# Patient Record
Sex: FEMALE | Race: WHITE | NOT HISPANIC OR LATINO | Employment: OTHER | URBAN - METROPOLITAN AREA
[De-identification: names, ages, dates, MRNs, and addresses within clinical notes are randomized per-mention and may not be internally consistent; named-entity substitution may affect disease eponyms.]

---

## 2017-03-21 ENCOUNTER — ALLSCRIPTS OFFICE VISIT (OUTPATIENT)
Dept: OTHER | Facility: OTHER | Age: 62
End: 2017-03-21

## 2017-03-21 DIAGNOSIS — Z12.31 ENCOUNTER FOR SCREENING MAMMOGRAM FOR MALIGNANT NEOPLASM OF BREAST: ICD-10-CM

## 2017-03-21 DIAGNOSIS — Z13.21 ENCOUNTER FOR SCREENING FOR NUTRITIONAL DISORDER: ICD-10-CM

## 2017-03-21 DIAGNOSIS — I10 ESSENTIAL (PRIMARY) HYPERTENSION: ICD-10-CM

## 2017-03-31 ENCOUNTER — HOSPITAL ENCOUNTER (OUTPATIENT)
Dept: RADIOLOGY | Facility: HOSPITAL | Age: 62
Discharge: HOME | End: 2017-03-31
Payer: COMMERCIAL

## 2017-03-31 DIAGNOSIS — Z12.31 ENCOUNTER FOR SCREENING MAMMOGRAM FOR MALIGNANT NEOPLASM OF BREAST: ICD-10-CM

## 2017-03-31 PROCEDURE — G0202 SCR MAMMO BI INCL CAD: HCPCS

## 2017-04-04 ENCOUNTER — ALLSCRIPTS OFFICE VISIT (OUTPATIENT)
Dept: OTHER | Facility: OTHER | Age: 62
End: 2017-04-04

## 2017-04-05 ENCOUNTER — ALLSCRIPTS OFFICE VISIT (OUTPATIENT)
Dept: OTHER | Facility: OTHER | Age: 62
End: 2017-04-05

## 2017-04-06 ENCOUNTER — APPOINTMENT (OUTPATIENT)
Dept: LAB | Facility: CLINIC | Age: 62
End: 2017-04-06
Payer: COMMERCIAL

## 2017-04-06 ENCOUNTER — GENERIC CONVERSION - ENCOUNTER (OUTPATIENT)
Dept: OTHER | Facility: OTHER | Age: 62
End: 2017-04-06

## 2017-04-06 ENCOUNTER — TRANSCRIBE ORDERS (OUTPATIENT)
Dept: LAB | Facility: CLINIC | Age: 62
End: 2017-04-06

## 2017-04-06 ENCOUNTER — TRANSCRIBE ORDERS (OUTPATIENT)
Dept: ADMINISTRATIVE | Facility: HOSPITAL | Age: 62
End: 2017-04-06

## 2017-04-06 ENCOUNTER — LAB REQUISITION (OUTPATIENT)
Dept: LAB | Facility: HOSPITAL | Age: 62
End: 2017-04-06
Payer: COMMERCIAL

## 2017-04-06 DIAGNOSIS — K12.2 ABSCESS OF SUBMANDIBULAR REGION: Primary | ICD-10-CM

## 2017-04-06 DIAGNOSIS — K12.2 CELLULITIS AND ABSCESS OF ORAL SOFT TISSUES: Primary | ICD-10-CM

## 2017-04-06 DIAGNOSIS — R93.89 ABNORMAL CAT SCAN: ICD-10-CM

## 2017-04-06 DIAGNOSIS — K12.2 CELLULITIS AND ABSCESS OF MOUTH: ICD-10-CM

## 2017-04-06 LAB
BUN SERPL-MCNC: 13 MG/DL (ref 5–25)
CREAT SERPL-MCNC: 0.97 MG/DL (ref 0.6–1.3)
GFR SERPL CREATININE-BSD FRML MDRD: 58.2 ML/MIN/1.73SQ M

## 2017-04-06 PROCEDURE — 87070 CULTURE OTHR SPECIMN AEROBIC: CPT | Performed by: OTOLARYNGOLOGY

## 2017-04-06 PROCEDURE — 82565 ASSAY OF CREATININE: CPT

## 2017-04-06 PROCEDURE — 87075 CULTR BACTERIA EXCEPT BLOOD: CPT | Performed by: OTOLARYNGOLOGY

## 2017-04-06 PROCEDURE — 87205 SMEAR GRAM STAIN: CPT | Performed by: OTOLARYNGOLOGY

## 2017-04-06 PROCEDURE — 84520 ASSAY OF UREA NITROGEN: CPT

## 2017-04-06 PROCEDURE — 36415 COLL VENOUS BLD VENIPUNCTURE: CPT

## 2017-04-09 LAB
BACTERIA SPEC ANAEROBE CULT: NORMAL
BACTERIA SPEC ANAEROBE CULT: NORMAL

## 2017-04-10 ENCOUNTER — HOSPITAL ENCOUNTER (OUTPATIENT)
Dept: RADIOLOGY | Facility: HOSPITAL | Age: 62
Discharge: HOME | End: 2017-04-10
Attending: OTOLARYNGOLOGY
Payer: COMMERCIAL

## 2017-04-10 DIAGNOSIS — K12.2 ABSCESS OF SUBMANDIBULAR REGION: ICD-10-CM

## 2017-04-10 PROCEDURE — 70491 CT SOFT TISSUE NECK W/DYE: CPT

## 2017-04-10 RX ADMIN — IOHEXOL 85 ML: 350 INJECTION, SOLUTION INTRAVENOUS at 19:17

## 2017-04-11 LAB
BACTERIA WND AEROBE CULT: NO GROWTH
GRAM STN SPEC: NORMAL
GRAM STN SPEC: NORMAL

## 2017-04-19 ENCOUNTER — HOSPITAL ENCOUNTER (OUTPATIENT)
Dept: RADIOLOGY | Facility: HOSPITAL | Age: 62
Discharge: HOME | End: 2017-04-19
Attending: OTOLARYNGOLOGY
Payer: COMMERCIAL

## 2017-04-19 DIAGNOSIS — R93.89 ABNORMAL CAT SCAN: ICD-10-CM

## 2017-04-19 PROCEDURE — 71260 CT THORAX DX C+: CPT

## 2017-04-19 RX ADMIN — IOHEXOL 85 ML: 350 INJECTION, SOLUTION INTRAVENOUS at 13:12

## 2017-04-26 ENCOUNTER — GENERIC CONVERSION - ENCOUNTER (OUTPATIENT)
Dept: OTHER | Facility: OTHER | Age: 62
End: 2017-04-26

## 2017-06-13 ENCOUNTER — ANESTHESIA EVENT (OUTPATIENT)
Dept: GASTROENTEROLOGY | Facility: HOSPITAL | Age: 62
End: 2017-06-13
Payer: COMMERCIAL

## 2017-06-14 ENCOUNTER — HOSPITAL ENCOUNTER (OUTPATIENT)
Facility: HOSPITAL | Age: 62
Setting detail: OUTPATIENT SURGERY
Discharge: HOME | End: 2017-06-14
Attending: INTERNAL MEDICINE | Admitting: INTERNAL MEDICINE
Payer: COMMERCIAL

## 2017-06-14 ENCOUNTER — ANESTHESIA (OUTPATIENT)
Dept: GASTROENTEROLOGY | Facility: HOSPITAL | Age: 62
End: 2017-06-14
Payer: COMMERCIAL

## 2017-06-14 ENCOUNTER — GENERIC CONVERSION - ENCOUNTER (OUTPATIENT)
Dept: OTHER | Facility: OTHER | Age: 62
End: 2017-06-14

## 2017-06-14 VITALS
BODY MASS INDEX: 34.15 KG/M2 | SYSTOLIC BLOOD PRESSURE: 101 MMHG | TEMPERATURE: 98.1 F | RESPIRATION RATE: 18 BRPM | WEIGHT: 200 LBS | OXYGEN SATURATION: 98 % | DIASTOLIC BLOOD PRESSURE: 69 MMHG | HEIGHT: 64 IN | HEART RATE: 75 BPM

## 2017-06-14 DIAGNOSIS — K21.9 GASTRO-ESOPHAGEAL REFLUX DISEASE WITHOUT ESOPHAGITIS: ICD-10-CM

## 2017-06-14 DIAGNOSIS — K51.90 ULCERATIVE COLITIS WITHOUT COMPLICATIONS (HCC): ICD-10-CM

## 2017-06-14 PROCEDURE — 88342 IMHCHEM/IMCYTCHM 1ST ANTB: CPT | Performed by: INTERNAL MEDICINE

## 2017-06-14 PROCEDURE — 88305 TISSUE EXAM BY PATHOLOGIST: CPT | Performed by: INTERNAL MEDICINE

## 2017-06-14 RX ORDER — PROPOFOL 10 MG/ML
INJECTION, EMULSION INTRAVENOUS AS NEEDED
Status: DISCONTINUED | OUTPATIENT
Start: 2017-06-14 | End: 2017-06-14 | Stop reason: SURG

## 2017-06-14 RX ORDER — SODIUM CHLORIDE 9 MG/ML
50 INJECTION, SOLUTION INTRAVENOUS CONTINUOUS
Status: DISCONTINUED | OUTPATIENT
Start: 2017-06-14 | End: 2017-06-14 | Stop reason: HOSPADM

## 2017-06-14 RX ORDER — PROPOFOL 10 MG/ML
INJECTION, EMULSION INTRAVENOUS CONTINUOUS PRN
Status: DISCONTINUED | OUTPATIENT
Start: 2017-06-14 | End: 2017-06-14 | Stop reason: SURG

## 2017-06-14 RX ORDER — IBUPROFEN AND FAMOTIDINE 26.6; 8 MG/1; MG/1
1 TABLET, FILM COATED ORAL AS NEEDED
COMMUNITY
End: 2018-06-30

## 2017-06-14 RX ADMIN — PROPOFOL 50 MG: 10 INJECTION, EMULSION INTRAVENOUS at 13:27

## 2017-06-14 RX ADMIN — PROPOFOL 150 MCG/KG/MIN: 10 INJECTION, EMULSION INTRAVENOUS at 13:29

## 2017-06-14 RX ADMIN — PROPOFOL 100 MG: 10 INJECTION, EMULSION INTRAVENOUS at 13:25

## 2017-06-14 RX ADMIN — LIDOCAINE HYDROCHLORIDE 50 MG: 20 INJECTION, SOLUTION INTRAVENOUS at 13:25

## 2017-06-14 RX ADMIN — SODIUM CHLORIDE 50 ML/HR: 0.9 INJECTION, SOLUTION INTRAVENOUS at 12:14

## 2017-06-26 ENCOUNTER — ALLSCRIPTS OFFICE VISIT (OUTPATIENT)
Dept: OTHER | Facility: OTHER | Age: 62
End: 2017-06-26

## 2017-06-26 DIAGNOSIS — K51.90 ULCERATIVE COLITIS WITHOUT COMPLICATIONS (HCC): ICD-10-CM

## 2017-06-26 DIAGNOSIS — R10.13 EPIGASTRIC PAIN: ICD-10-CM

## 2017-06-26 DIAGNOSIS — I10 ESSENTIAL (PRIMARY) HYPERTENSION: ICD-10-CM

## 2017-06-26 DIAGNOSIS — Z76.89 PERSONS ENCOUNTERING HEALTH SERVICES IN OTHER SPECIFIED CIRCUMSTANCES: ICD-10-CM

## 2017-12-11 ENCOUNTER — ALLSCRIPTS OFFICE VISIT (OUTPATIENT)
Dept: OTHER | Facility: OTHER | Age: 62
End: 2017-12-11

## 2017-12-11 DIAGNOSIS — K51.90 ULCERATIVE COLITIS WITHOUT COMPLICATIONS (HCC): ICD-10-CM

## 2017-12-11 DIAGNOSIS — I10 ESSENTIAL (PRIMARY) HYPERTENSION: ICD-10-CM

## 2018-01-10 NOTE — MISCELLANEOUS
Message  I spoke with Ms Charles today and let her know the plan for her IVC filter prior to her next ELICEO including stopping coumadin 5 days before and going on lovenox  She will continue with coumadin for now and take 3mg of coumadin until her next INR  She mentioned she is going to the dentist soon and needs abx  I will send in amoxicillin  She understands the plan and agrees  Plan  Prothrombin gene mutation, Status post total replacement of right hip    · Enoxaparin Sodium 40 MG/0 4ML Subcutaneous Solution;  Stop coumadin 5 days  prior to surgery and start Lovenox 40mg subQ daily until day of surgery,  not including day of surgery  Status post total replacement of right hip    · Amoxicillin 500 MG Oral Tablet; TAKE 4 TABLETS 1 HOUR BEFORE DENTAL  APPOINTMENT    Signatures   Electronically signed by : Doron Peralta DO; Mar  8 2016  4:55PM EST                       (Author)

## 2018-01-12 VITALS
WEIGHT: 224.38 LBS | HEIGHT: 64 IN | BODY MASS INDEX: 38.31 KG/M2 | SYSTOLIC BLOOD PRESSURE: 112 MMHG | OXYGEN SATURATION: 97 % | DIASTOLIC BLOOD PRESSURE: 60 MMHG | HEART RATE: 87 BPM

## 2018-01-12 VITALS
DIASTOLIC BLOOD PRESSURE: 90 MMHG | SYSTOLIC BLOOD PRESSURE: 142 MMHG | HEIGHT: 64 IN | OXYGEN SATURATION: 96 % | HEART RATE: 88 BPM | WEIGHT: 213.5 LBS | BODY MASS INDEX: 36.45 KG/M2 | RESPIRATION RATE: 18 BRPM

## 2018-01-12 NOTE — PROCEDURES
Procedures by Gregory Ramires MD at 6/6/2016   1:36 PM      Author:  Gregory Ramires MD Service:  Interventional Radiology  Author Type:  Physician     Filed:  6/6/2016  1:37 PM Date of Service:  6/6/2016  1:36 PM Status:  Signed     :  Gregory Ramires MD (Physician)            Procedures   INTERVENTIONAL RADIOLOGY PROCEDURE NOTE    Date: 06/06/16    Procedures:     1  US guided right IJ access  2  IVC cavogram  3  Removal of IVC filter  4  Post removal cavogram    Preoperative diagnosis: indwelling filter for removal      Postoperative diagnosis: Same    Surgeon: Gregory Ramires MD     Assistant: None  No qualified resident was available  Blood loss: Minimal    Specimens: none    Findings: filter was completely removed  Cava was intact    Complications: None    Anesthesia: IV moderate conscious sedation    Plan:  Bed rest 1 hour, then may DC home if remains stable                  Received for:Provider  [facility]   Jun 6 2016  1:37PM [facility] Standard Time

## 2018-01-13 VITALS
HEIGHT: 64 IN | TEMPERATURE: 99.4 F | DIASTOLIC BLOOD PRESSURE: 82 MMHG | RESPIRATION RATE: 20 BRPM | OXYGEN SATURATION: 86 % | BODY MASS INDEX: 36.29 KG/M2 | HEART RATE: 84 BPM | WEIGHT: 212.6 LBS | SYSTOLIC BLOOD PRESSURE: 120 MMHG

## 2018-01-13 VITALS
WEIGHT: 211.38 LBS | HEART RATE: 86 BPM | OXYGEN SATURATION: 95 % | BODY MASS INDEX: 37.45 KG/M2 | SYSTOLIC BLOOD PRESSURE: 108 MMHG | HEIGHT: 63 IN | DIASTOLIC BLOOD PRESSURE: 72 MMHG

## 2018-01-23 VITALS
OXYGEN SATURATION: 94 % | WEIGHT: 225 LBS | DIASTOLIC BLOOD PRESSURE: 74 MMHG | HEIGHT: 64 IN | SYSTOLIC BLOOD PRESSURE: 122 MMHG | HEART RATE: 83 BPM | BODY MASS INDEX: 38.41 KG/M2

## 2018-01-23 NOTE — MISCELLANEOUS
Message  Return to work or school:   Sandra Turcios is under my professional care  She was seen in my office on 12/11/2017       Please excuse from work 12/09/2017 and 12/10/2017          Signatures   Electronically signed by : Gina Cummins, ; Dec 11 2017  3:52PM EST                       (Author)

## 2018-02-14 DIAGNOSIS — R10.13 DYSPEPSIA: Primary | ICD-10-CM

## 2018-02-14 RX ORDER — PANTOPRAZOLE SODIUM 20 MG/1
TABLET, DELAYED RELEASE ORAL
Qty: 30 TABLET | Refills: 1 | Status: SHIPPED | OUTPATIENT
Start: 2018-02-14 | End: 2018-04-15 | Stop reason: SDUPTHER

## 2018-03-08 DIAGNOSIS — F51.01 PRIMARY INSOMNIA: Primary | ICD-10-CM

## 2018-03-08 RX ORDER — ZOLPIDEM TARTRATE 10 MG/1
TABLET ORAL
Qty: 90 TABLET | Refills: 3 | Status: SHIPPED | OUTPATIENT
Start: 2018-03-08 | End: 2018-03-15 | Stop reason: SDUPTHER

## 2018-03-15 DIAGNOSIS — F51.01 PRIMARY INSOMNIA: ICD-10-CM

## 2018-03-15 RX ORDER — ZOLPIDEM TARTRATE 10 MG/1
10 TABLET ORAL
Qty: 90 TABLET | Refills: 0 | Status: SHIPPED | OUTPATIENT
Start: 2018-03-15 | End: 2018-11-05 | Stop reason: SDUPTHER

## 2018-03-19 DIAGNOSIS — B37.2 YEAST DERMATITIS: Primary | ICD-10-CM

## 2018-03-19 RX ORDER — NYSTATIN 100000 [USP'U]/G
POWDER TOPICAL 2 TIMES DAILY
Qty: 15 G | Refills: 0 | Status: SHIPPED | OUTPATIENT
Start: 2018-03-19 | End: 2021-10-23 | Stop reason: HOSPADM

## 2018-04-15 DIAGNOSIS — R10.13 DYSPEPSIA: ICD-10-CM

## 2018-04-16 RX ORDER — PANTOPRAZOLE SODIUM 20 MG/1
TABLET, DELAYED RELEASE ORAL
Qty: 30 TABLET | Refills: 1 | Status: SHIPPED | OUTPATIENT
Start: 2018-04-16 | End: 2018-04-19 | Stop reason: SDUPTHER

## 2018-04-19 DIAGNOSIS — R10.13 DYSPEPSIA: ICD-10-CM

## 2018-04-19 RX ORDER — PANTOPRAZOLE SODIUM 20 MG/1
20 TABLET, DELAYED RELEASE ORAL DAILY
Qty: 90 TABLET | Refills: 1 | Status: SHIPPED | OUTPATIENT
Start: 2018-04-19 | End: 2018-04-27 | Stop reason: SDUPTHER

## 2018-04-27 ENCOUNTER — TELEPHONE (OUTPATIENT)
Dept: INTERNAL MEDICINE CLINIC | Facility: CLINIC | Age: 63
End: 2018-04-27

## 2018-04-27 DIAGNOSIS — R10.13 DYSPEPSIA: ICD-10-CM

## 2018-04-27 DIAGNOSIS — I15.9 SECONDARY HYPERTENSION: Primary | ICD-10-CM

## 2018-04-27 DIAGNOSIS — F32.A DEPRESSION, UNSPECIFIED DEPRESSION TYPE: ICD-10-CM

## 2018-04-27 DIAGNOSIS — F32.A DEPRESSION, UNSPECIFIED DEPRESSION TYPE: Primary | ICD-10-CM

## 2018-04-27 RX ORDER — FLUOXETINE 10 MG/1
30 CAPSULE ORAL DAILY
Qty: 90 CAPSULE | Refills: 1 | Status: SHIPPED | OUTPATIENT
Start: 2018-04-27 | End: 2021-10-23 | Stop reason: HOSPADM

## 2018-04-27 RX ORDER — PANTOPRAZOLE SODIUM 20 MG/1
20 TABLET, DELAYED RELEASE ORAL DAILY
Qty: 90 TABLET | Refills: 1 | Status: SHIPPED | OUTPATIENT
Start: 2018-04-27 | End: 2018-10-02 | Stop reason: SDUPTHER

## 2018-04-27 RX ORDER — OLMESARTAN MEDOXOMIL 40 MG/1
40 TABLET ORAL DAILY
Qty: 90 TABLET | Refills: 1 | Status: SHIPPED | OUTPATIENT
Start: 2018-04-27 | End: 2019-01-17 | Stop reason: SDUPTHER

## 2018-04-27 RX ORDER — FLUOXETINE 10 MG/1
10 TABLET, FILM COATED ORAL DAILY
Qty: 90 TABLET | Refills: 1 | Status: SHIPPED | OUTPATIENT
Start: 2018-04-27 | End: 2018-06-30

## 2018-04-27 RX ORDER — FLUOXETINE HYDROCHLORIDE 20 MG/1
20 CAPSULE ORAL DAILY
Qty: 90 CAPSULE | Refills: 1 | Status: SHIPPED | OUTPATIENT
Start: 2018-04-27 | End: 2018-06-30

## 2018-04-27 RX ORDER — ATENOLOL 25 MG/1
50 TABLET ORAL EVERY MORNING
Qty: 90 TABLET | Refills: 1 | Status: SHIPPED | OUTPATIENT
Start: 2018-04-27 | End: 2018-05-10 | Stop reason: SDUPTHER

## 2018-04-27 RX ORDER — FLUOXETINE HYDROCHLORIDE 20 MG/1
CAPSULE ORAL
Qty: 90 CAPSULE | Refills: 3 | Status: SHIPPED | OUTPATIENT
Start: 2018-04-27 | End: 2018-04-27 | Stop reason: SDUPTHER

## 2018-04-27 RX ORDER — FLUOXETINE 10 MG/1
TABLET, FILM COATED ORAL
Qty: 90 TABLET | Refills: 3 | Status: SHIPPED | OUTPATIENT
Start: 2018-04-27 | End: 2018-04-27 | Stop reason: SDUPTHER

## 2018-04-27 NOTE — TELEPHONE ENCOUNTER
Pt called and states that all her prescriptions now have to go through [facility] and need doctors approval  She is asking if you can call her back at [phone]

## 2018-04-30 NOTE — TELEPHONE ENCOUNTER
PT called again and said [facility] waiting for approval  Please call her preferably before 3 pm

## 2018-05-10 DIAGNOSIS — I15.9 SECONDARY HYPERTENSION: ICD-10-CM

## 2018-05-11 RX ORDER — ATENOLOL 25 MG/1
50 TABLET ORAL EVERY MORNING
Qty: 180 TABLET | Refills: 0 | Status: SHIPPED | OUTPATIENT
Start: 2018-05-11 | End: 2019-03-27 | Stop reason: SDUPTHER

## 2018-06-30 ENCOUNTER — APPOINTMENT (EMERGENCY)
Dept: RADIOLOGY | Facility: HOSPITAL | Age: 63
End: 2018-06-30
Payer: COMMERCIAL

## 2018-06-30 ENCOUNTER — HOSPITAL ENCOUNTER (EMERGENCY)
Facility: HOSPITAL | Age: 63
Discharge: HOME | End: 2018-06-30
Attending: EMERGENCY MEDICINE
Payer: COMMERCIAL

## 2018-06-30 VITALS
DIASTOLIC BLOOD PRESSURE: 99 MMHG | OXYGEN SATURATION: 92 % | SYSTOLIC BLOOD PRESSURE: 151 MMHG | TEMPERATURE: 98.2 F | RESPIRATION RATE: 18 BRPM | HEART RATE: 90 BPM

## 2018-06-30 DIAGNOSIS — M25.511 SHOULDER PAIN, RIGHT: Primary | ICD-10-CM

## 2018-06-30 PROCEDURE — 73030 X-RAY EXAM OF SHOULDER: CPT

## 2018-06-30 PROCEDURE — 99283 EMERGENCY DEPT VISIT LOW MDM: CPT

## 2018-06-30 RX ORDER — ACETAMINOPHEN AND CODEINE PHOSPHATE 300; 30 MG/1; MG/1
1-2 TABLET ORAL EVERY 6 HOURS PRN
Qty: 10 TABLET | Refills: 0 | Status: SHIPPED | OUTPATIENT
Start: 2018-06-30 | End: 2019-12-18 | Stop reason: ALTCHOICE

## 2018-06-30 NOTE — ED NOTES
Pt complains of right shoulder pain since yesterday, states she did not injury to extremity or fall recently  Pt states she believes it is her rotator cuff, states she does have an other physician but has not seen them for this pain   Pt complains of increased stiffness       Cindy Lerner RN  06/30/18 [id number]

## 2018-06-30 NOTE — ED PROVIDER NOTES
History  Chief Complaint   Patient presents with    Shoulder Injury     C/o right shoulder pain x several months  No recent trauma noted  62 y/o female with a year hx of on and off right shoulder pain, oa, depression and anxiety nos came to ed c/o right shoulder worsening pain since yesterday  Pt deny fall or other trauma she could remember  She states gradual onset pain that got worse this morning  Pt with sifnificant loss or rom  Also she appears tender to palpation or right shoulder bicep tendon inserction at shoulder  Can not r/o other soft tissue shoulder pain causes, bursitis or rotator cuff tear or strain  Pt deny systemic sx  Pain better with no rom and but attempted rom pain worse  Prior to Admission Medications   Prescriptions Last Dose Informant Patient Reported? Taking? FLUoxetine (PROZAC) 10 mg capsule   No Yes   Sig: Take 3 capsules (30 mg total) by mouth daily   atenolol (TENORMIN) 25 mg tablet   No Yes   Sig: Take 2 tablets (50 mg total) by mouth every morning   cycloSPORINE (RESTASIS) 0 05 % ophthalmic emulsion   Yes Yes   Si drop 2 (two) times a day    gabapentin (NEURONTIN) 300 mg capsule   Yes Yes   Sig: Take 300 mg by mouth 3 (three) times a day     nystatin (MYCOSTATIN) powder   No Yes   Sig: Apply topically 2 (two) times a day   olmesartan (BENICAR) 40 mg tablet   No Yes   Sig: Take 1 tablet (40 mg total) by mouth daily   pantoprazole (PROTONIX) 20 mg tablet   No Yes   Sig: Take 1 tablet (20 mg total) by mouth daily   zolpidem (AMBIEN) 10 mg tablet   No Yes   Sig: Take 1 tablet (10 mg total) by mouth daily at bedtime      Facility-Administered Medications: None       Past Medical History:   Diagnosis Date    Acid reflux     Anxiety     Arthritis     Back pain     Carpal tunnel syndrome, bilateral     Depression     Hiatal hernia     History of DVT of lower extremity     Right leg-had IVc filter  removal today-2016    History of pulmonary embolism     History of uterine cancer     Was stage 3- uterine, ovaries-hysterectomy    Hypercholesteremia     Hypertension     Left leg pain     Lumbar herniated disc     Tumor cells, benign     In right atrium  Probably congenital     Varicose vein of leg     Wears glasses        Past Surgical History:   Procedure Laterality Date    COLONOSCOPY      DENTAL SURGERY      DILATION AND CURETTAGE OF UTERUS      ESOPHAGOGASTRODUODENOSCOPY      FOOT SURGERY      LAST ASSESSED: [id number]    HYSTERECTOMY      uterine cancer-hx    JOINT REPLACEMENT      b/l   THR    [place] COLONOSCOPY FLX DX W/COLLJ SPEC WHEN PFRMD N/A 6/14/2017    Procedure: EGD AND COLONOSCOPY;  Surgeon: Autry Jeans, MD;  [facility]; Service: Gastroenterology    [place] TOTAL HIP ARTHROPLASTY Left 4/13/2016    Procedure: ARTHROPLASTY HIP TOTAL;  Surgeon: Pearle Meigs, DO;  [facility];  Service: Orthopedics    TONSILLECTOMY AND ADENOIDECTOMY      TOTAL HIP ARTHROPLASTY Right     LAST ASSESSED: [id number]       Family History   Problem Relation Age of Onset    Heart failure Father     Arthritis Family      I have reviewed and agree with the history as documented  Social History   Substance Use Topics    Smoking status: Current Every Day Smoker     Packs/day: 0 20     Years: 20 00     Types: Cigarettes    Smokeless tobacco: Never Used      Comment: Smokes 5 cigarettes daily,hx age 12 to 2012-2ppd    Alcohol use Yes      Comment: 2 or 3 monthly        Review of Systems   Respiratory: Negative  Cardiovascular: Negative  Gastrointestinal: Negative  Musculoskeletal: Positive for arthralgias and myalgias  Skin: Negative  All other systems reviewed and are negative  Physical Exam  Physical Exam   Constitutional: No distress  Eyes: Right eye exhibits no discharge  Left eye exhibits no discharge  Neck: Normal range of motion  Pulmonary/Chest: Effort normal  No respiratory distress     Abdominal: There is no guarding  Musculoskeletal: She exhibits tenderness  She exhibits no deformity  Arms:  Tender to palpation of bicep tendon insertion   Skin: Skin is warm  No rash noted  She is not diaphoretic  No erythema  Nursing note and vitals reviewed  Vital Signs  ED Triage Vitals [06/30/18 1501]   Temperature Pulse Respirations Blood Pressure SpO2   98 2 °F (36 8 °C) 90 18 151/99 92 %      Temp Source Heart Rate Source Patient Position - Orthostatic VS BP Location FiO2 (%)   Oral Monitor Sitting Right arm --      Pain Score       Worst Possible Pain           Vitals:    06/30/18 1501   BP: 151/99   Pulse: 90   Patient Position - Orthostatic VS: Sitting       Visual Acuity      ED Medications  Medications - No data to display    Diagnostic Studies  Results Reviewed     None                 XR shoulder 2+ views RIGHT   ED Interpretation by Royal Pipe PA-C (06/30 1640)   No fx                 Procedures  Procedures       Phone Contacts  ED Phone Contact    ED Course                               MDM  Number of Diagnoses or Management Options  Shoulder pain, right:   Diagnosis management comments:  Pt does not have allergy to Tylenol #3  She wants some rx  Amount and/or Complexity of Data Reviewed  Tests in the radiology section of CPT®: ordered and reviewed      CritCare Time    Disposition  Final diagnoses:   Shoulder pain, right     Time reflects when diagnosis was documented in both MDM as applicable and the Disposition within this note     Time User Action Codes Description Comment    6/30/2018  4:49 PM Ian Bethea Add [Y30 507] Shoulder pain, right       ED Disposition     ED Disposition Condition Comment    Discharge  Hayder Brizuela discharge to home/self care      Condition at discharge: Stable        Follow-up Information     Follow up With Specialties Details Why Contact Radha Lawson MD Sports Medicine, Orthopedic Surgery, Multidisciplinary   [address] [facility] [place] [address]  [phone]            Patient's Medications   Discharge Prescriptions    ACETAMINOPHEN-CODEINE (TYLENOL #3) 300-30 MG PER TABLET    Take 1-2 tablets by mouth every 6 (six) hours as needed for moderate pain for up to 10 doses       Start Date: 6/30/2018 End Date: --       Order Dose: 1-2 tablets       Quantity: 10 tablet    Refills: 0     No discharge procedures on file      ED Provider  Electronically Signed by           Monie Oglesby PA-C  06/30/18 7548

## 2018-06-30 NOTE — DISCHARGE INSTRUCTIONS
Shoulder Pain, Ambulatory Care   GENERAL INFORMATION:   Shoulder pain  is a common problem and can affect your daily activities  Pain can be caused by a problem within your shoulder  Shoulder pain may also be caused by pain that spreads to your shoulder from another part of your body  Seek immediate care for the following symptoms:   · Severe pain    · Inability to move your arm or shoulder    · Numbness or tingling in your shoulder or arm  Treatment for shoulder pain  may include any of the following:  · Acetaminophen  decreases pain and fever  It is available without a doctor's order  Ask how much to take and how often to take it  Follow directions  Acetaminophen can cause liver damage if not taken correctly  · NSAIDs  help decrease swelling and pain or fever  This medicine is available with or without a doctor's order  NSAIDs can cause stomach bleeding or kidney problems in certain people  If you take blood thinner medicine, always ask your healthcare provider if NSAIDs are safe for you  Always read the medicine label and follow directions  · A steroid injection  may help decrease pain and swelling  · Surgery  may be needed for long-term pain and loss of function  Manage your symptoms:   · Apply ice  on your shoulder for 20 to 30 minutes every 2 hours or as directed  Use an ice pack, or put crushed ice in a plastic bag  Cover it with a towel  Ice helps prevent tissue damage and decreases swelling and pain  · Apply heat if ice does not help your symptoms  Apply heat on your shoulder for 20 to 30 minutes every 2 hours for as many days as directed  Heat helps decrease pain and muscle spasms  · Go to physical or occupational therapy as directed  A physical therapist teaches you exercises to help improve movement and strength, and to decrease pain  An occupational therapist teaches you skills to help with your daily activities  Prevent shoulder pain:   · Stretch and strengthen your shoulder  Use proper technique during exercises and sports  · Limit activities as directed  Try to avoid repeated overhead movements  Follow up with your healthcare provider or orthopedist as directed:  Write down your questions so you remember to ask them during your visits  CARE AGREEMENT:   You have the right to help plan your care  Learn about your health condition and how it may be treated  Discuss treatment options with your caregivers to decide what care you want to receive  You always have the right to refuse treatment  The above information is an  only  It is not intended as medical advice for individual conditions or treatments  Talk to your doctor, nurse or pharmacist before following any medical regimen to see if it is safe and effective for you  © 2014 [address] is for End User's use only and may not be sold, redistributed or otherwise used for commercial purposes  All illustrations and images included in CareNotes® are the copyrighted property of A D A M , Inc  or Hector Vasquez

## 2018-07-18 ENCOUNTER — OFFICE VISIT (OUTPATIENT)
Dept: OBGYN CLINIC | Facility: OTHER | Age: 63
End: 2018-07-18
Payer: COMMERCIAL

## 2018-07-18 VITALS — WEIGHT: 209 LBS | BODY MASS INDEX: 37.03 KG/M2 | HEIGHT: 63 IN

## 2018-07-18 DIAGNOSIS — M19.011 OSTEOARTHRITIS OF GLENOHUMERAL JOINT, RIGHT: Primary | ICD-10-CM

## 2018-07-18 PROCEDURE — 99214 OFFICE O/P EST MOD 30 MIN: CPT | Performed by: ORTHOPAEDIC SURGERY

## 2018-07-18 PROCEDURE — 20610 DRAIN/INJ JOINT/BURSA W/O US: CPT | Performed by: ORTHOPAEDIC SURGERY

## 2018-07-18 RX ORDER — METHYLPREDNISOLONE ACETATE 40 MG/ML
1 INJECTION, SUSPENSION INTRA-ARTICULAR; INTRALESIONAL; INTRAMUSCULAR; SOFT TISSUE
Status: COMPLETED | OUTPATIENT
Start: 2018-07-18 | End: 2018-07-18

## 2018-07-18 RX ORDER — BUPIVACAINE HYDROCHLORIDE 2.5 MG/ML
4 INJECTION, SOLUTION INFILTRATION; PERINEURAL
Status: COMPLETED | OUTPATIENT
Start: 2018-07-18 | End: 2018-07-18

## 2018-07-18 RX ORDER — CYCLOSPORINE 0.5 MG/ML
EMULSION OPHTHALMIC
COMMUNITY
End: 2018-07-18 | Stop reason: SDUPTHER

## 2018-07-18 RX ORDER — OMEPRAZOLE 20 MG/1
CAPSULE, DELAYED RELEASE ORAL
COMMUNITY
End: 2018-10-10 | Stop reason: ALTCHOICE

## 2018-07-18 RX ORDER — FLUOXETINE 20 MG/1
1 TABLET, FILM COATED ORAL DAILY
COMMUNITY
End: 2018-07-18 | Stop reason: SDUPTHER

## 2018-07-18 RX ADMIN — BUPIVACAINE HYDROCHLORIDE 4 ML: 2.5 INJECTION, SOLUTION INFILTRATION; PERINEURAL at 14:41

## 2018-07-18 RX ADMIN — METHYLPREDNISOLONE ACETATE 1 ML: 40 INJECTION, SUSPENSION INTRA-ARTICULAR; INTRALESIONAL; INTRAMUSCULAR; SOFT TISSUE at 14:41

## 2018-07-18 NOTE — PROGRESS NOTES
Orthopaedic Surgery - Office Note  Giuseppe Reagan (89 y o  female)   : 1955   MRN: [id number]  Encounter Date: 2018    Chief Complaint   Patient presents with    Right Shoulder - Pain       Assessment / Plan  Right glenohumeral osteoarthritis    · CSI of right glenohumeral joint was performed  · Activity as tolerated  · Begin outpatient PT for Right glenohumeral osteoarthritis  · Focus on progressing ROM  · Anti-inflammatories or Tylenol prn pain   · Ice/heat for pain control  Return 4-6 weeks, for Re-evaluation  History of Present Illness  Giuseppe Reagan is a 61 y o  RHD female who presents for initial evaluation of right shoulder pain x1 +years  She reports pain that is lateral/deep as well as anterior, achy but sharp with certain motions, 0/94 pain  She also reports clicking and grinding with shoulder motion, as well as feelings of instability when performing tasks behind her back such as putting on clothes or hooking her bra    She reports that although her pain has been ongoing for many years, within the last 3 weeks it has reached a point of becoming unbearable  She denies any numbness or tingling, any specific incident of injury, any bruising or swelling  Review of Systems  Pertinent items are noted in HPI  All other systems were reviewed and are negative  Physical Exam  Ht 5' 3" (1 6 m)   Wt 94 8 kg (209 lb)   BMI 37 02 kg/m²   Cons: Appears well  No apparent distress  Psych: Alert  Oriented x3  Mood and affect normal   Eyes: PERRLA, EOMI  Resp: Normal effort  No audible wheezing or stridor  CV: Palpable pulse  No discernable arrhythmia  No LE edema  Lymph:  No palpable cervical, axillary, or inguinal lymphadenopathy  Skin: Warm  No palpable masses  No visible lesions  Neuro: Normal muscle tone  Normal and symmetric DTR's  Right Shoulder Exam  Alignment / Posture:  Normal cervical alignment  Normal shoulder posture  Inspection:  No swelling  No edema   No erythema  No ecchymosis  No muscle atrophy  Palpation:  TTP over proximal biceps tendon  TTP over subacromial bursa  Palpable crepitus with shoulder elevation in both sagittal and frontal planes  ROM:  Shoulder FE Active 100°, passive 160°  Shoulder ER 50  Shoulder IR T8  Strength:  5/5 supraspinatus, infraspinatus, and subscapularis  Stability:  No objective shoulder instability  Tests: (+) Painful arc  (-) Belly press  (-) Bear hug  (-) Speed  (-) Cross-body adduction  Neurovascular:  Sensation intact in Ax/R/M/U nerve distributions  2+ radial pulse  Studies Reviewed  XR of right shoulder - series taken 6/30/2018 signficant for degenerative findings of [address] joint with osteophyte formation in the inferior aspect of the humeral head    Large joint arthrocentesis  Date/Time: 7/18/2018 2:41 PM  Consent given by: patient  Site marked: site marked  Timeout: Immediately prior to procedure a time out was called to verify the correct patient, procedure, equipment, support staff and site/side marked as required   Supporting Documentation  Indications: pain   Procedure Details  Location: shoulder - R glenohumeral  Preparation: Patient was prepped and draped in the usual sterile fashion  Needle size: 22 G  Ultrasound guidance: no  Approach: anterior  Medications administered: 4 mL bupivacaine 0 25 %; 1 mL methylPREDNISolone acetate 40 mg/mL    Patient tolerance: patient tolerated the procedure well with no immediate complications  Dressing:  Sterile dressing applied           Medical, Surgical, Family, and Social History  The patient's medical history, family history, and social history, were reviewed and updated as appropriate      Past Medical History:   Diagnosis Date    Acid reflux     Anxiety     Arthritis     Back pain     Carpal tunnel syndrome, bilateral     Depression     Hiatal hernia     History of DVT of lower extremity     Right leg-had IVc filter  removal today-6/6/2016    History of pulmonary embolism     History of uterine cancer     Was stage 3- uterine, ovaries-hysterectomy    Hypercholesteremia     Hypertension     Left leg pain     Lumbar herniated disc     Tumor cells, benign     In right atrium  Probably congenital     Varicose vein of leg     Wears glasses        Past Surgical History:   Procedure Laterality Date    COLONOSCOPY      DENTAL SURGERY      DILATION AND CURETTAGE OF UTERUS      ESOPHAGOGASTRODUODENOSCOPY      FOOT SURGERY      LAST ASSESSED: [id number]    HYSTERECTOMY      uterine cancer-hx    JOINT REPLACEMENT      b/l   THR    [place] COLONOSCOPY FLX DX W/COLLJ SPEC WHEN PFRMD N/A 6/14/2017    Procedure: EGD AND COLONOSCOPY;  Surgeon: Cyril Del Toro MD;  [facility]; Service: Gastroenterology    [place] TOTAL HIP ARTHROPLASTY Left 4/13/2016    Procedure: ARTHROPLASTY HIP TOTAL;  Surgeon: Ofelia Mott DO;  [facility];  Service: Orthopedics    TONSILLECTOMY AND ADENOIDECTOMY      TOTAL HIP ARTHROPLASTY Right     LAST ASSESSED: [id number]       Family History   Problem Relation Age of Onset    Heart failure Father     Arthritis Family        Social History     Occupational History    Not on file       Social History Main Topics    Smoking status: Current Every Day Smoker     Packs/day: 0 20     Years: 20 00     Types: Cigarettes    Smokeless tobacco: Never Used      Comment: Smokes 5 cigarettes daily,hx age 12 to 2012-2ppd    Alcohol use Yes      Comment: 2 or 3 monthly    Drug use: No    Sexual activity: Not on file       Allergies   Allergen Reactions    Percocet [Oxycodone-Acetaminophen] Hives    Amlodipine     Percocet  [Oxycodone-Acetaminophen]          Current Outpatient Prescriptions:     atenolol (TENORMIN) 25 mg tablet, Take 2 tablets (50 mg total) by mouth every morning, Disp: 180 tablet, Rfl: 0    cycloSPORINE (RESTASIS) 0 05 % ophthalmic emulsion, 1 drop 2 (two) times a day , Disp: , Rfl:     FLUoxetine (PROZAC) 10 mg capsule, Take 3 capsules (30 mg total) by mouth daily, Disp: 90 capsule, Rfl: 1    gabapentin (NEURONTIN) 300 mg capsule, Take 300 mg by mouth 3 (three) times a day , Disp: , Rfl:     nystatin (MYCOSTATIN) powder, Apply topically 2 (two) times a day, Disp: 15 g, Rfl: 0    olmesartan (BENICAR) 40 mg tablet, Take 1 tablet (40 mg total) by mouth daily, Disp: 90 tablet, Rfl: 1    omeprazole (PriLOSEC) 20 mg delayed release capsule, Take by mouth, Disp: , Rfl:     pantoprazole (PROTONIX) 20 mg tablet, Take 1 tablet (20 mg total) by mouth daily, Disp: 90 tablet, Rfl: 1    zolpidem (AMBIEN) 10 mg tablet, Take 1 tablet (10 mg total) by mouth daily at bedtime, Disp: 90 tablet, Rfl: 0    acetaminophen-codeine (TYLENOL #3) 300-30 mg per tablet, Take 1-2 tablets by mouth every 6 (six) hours as needed for moderate pain for up to 10 doses, Disp: 10 tablet, Rfl: 0      [employer]    I,:   Santino Burch am acting as a scribe while in the presence of the attending physician :        I,:   Tatum Biswas MD personally performed the services described in this documentation    as scribed in my presence :

## 2018-08-01 ENCOUNTER — EVALUATION (OUTPATIENT)
Dept: PHYSICAL THERAPY | Facility: CLINIC | Age: 63
End: 2018-08-01
Payer: COMMERCIAL

## 2018-08-01 DIAGNOSIS — M19.011 OSTEOARTHRITIS OF GLENOHUMERAL JOINT, RIGHT: ICD-10-CM

## 2018-08-01 PROCEDURE — G8984 CARRY CURRENT STATUS: HCPCS | Performed by: PHYSICAL THERAPIST

## 2018-08-01 PROCEDURE — 97110 THERAPEUTIC EXERCISES: CPT | Performed by: PHYSICAL THERAPIST

## 2018-08-01 PROCEDURE — 97161 PT EVAL LOW COMPLEX 20 MIN: CPT | Performed by: PHYSICAL THERAPIST

## 2018-08-01 PROCEDURE — G8985 CARRY GOAL STATUS: HCPCS | Performed by: PHYSICAL THERAPIST

## 2018-08-01 NOTE — PROGRESS NOTES
PT Evaluation     Today's date: 2018  Patient name: Tonny Piedra  : 1955  MRN: [id number]  Referring provider: Anna Dooley MD  Dx:   Encounter Diagnosis     ICD-10-CM    1  Osteoarthritis of glenohumeral joint, right M19 011 Ambulatory referral to Physical Therapy                  Assessment  Impairments: lacks appropriate home exercise program, scapular dyskinesis and poor posture   Functional limitations: unable to lift pots/pans, difficulty with bathing/dressing, interrupted sleep, difficulty reaching above shoulder height, discomfort driving  Assessment details: Pt is a 62 yo female with diagnosis of right glenohumeral osteoarthritis presenting with decreased strength, poor posture, decreased scapular stability, and limited ROM in addition to the above functional limitations  She is an excellent candidate for outpatient physical therapy to address the above impairments and optimize functional use of right arm  Understanding of Dx/Px/POC: good   Prognosis: good    Goals  STG - 3 weeks  1  Independent with HEP  2  Maintain neutral static posture at least 75% of the time without cueing    LTG - 6 weeks  1  Improve strength of R shoulder and B scapular stabilizers to 4/5 to improve posture  2  Tolerate lifting 5-10 lbs overhead without onset of pain  3  Complete ADLs without pain to allow resuming PLOF        Plan  Patient would benefit from: skilled physical therapy  Planned modality interventions: low level laser therapy, cryotherapy and thermotherapy: hydrocollator packs  Planned therapy interventions: manual therapy, therapeutic activities, therapeutic exercise and home exercise program  Frequency: 1x week  Duration in weeks: 8  Plan of Care expiration date: 2018  Treatment plan discussed with: patient  Plan details: 1x/week due to work schedule        Subjective Evaluation    History of Present Illness  Date of onset: 2018  Mechanism of injury: Pt reports she woke up about a month ago with severe pain in her R shoulder with no apparent cause  She went to the ER where they took xrays which revealed arthritic changes  She then followed up with an orthopedist appx 2 weeks ago and was given a cortisone shot, which provided appx 50% relief      PMH: significant for HTN, h/o uterine CA, osteoarthrtis  Not a recurrent problem   Quality of life: good    Pain  Current pain rating: 3  At best pain rating: 3  At worst pain rating: 10  Quality: dull ache (denies burning, numbness, or tingling or radiating past elbow)  Relieving factors: heat and rest  Aggravating factors: overhead activity and lifting  Progression: no change    Social Support  Lives with: adult children and young children    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)  Hand dominance: right      Diagnostic Tests  X-ray: abnormal  Treatments  No previous or current treatments  Patient Goals  Patient goals for therapy: decreased pain, increased strength and increased motion          Objective     Cervical/Thoracic Screen   Cervical range of motion within normal limits with the following exceptions: WFL with no effect on shoulder pain    Active Range of Motion     Right Shoulder   Flexion: 130 degrees with pain  Abduction: 85 degrees with pain  External rotation BTH: C7   Internal rotation 0°: with pain  Internal rotation BTB: T10     Passive Range of Motion     Right Shoulder   Flexion: 135 degrees with pain  Abduction: 135 degrees with pain  External rotation 90°: 30 degrees with pain  Internal rotation 90°: 35 degrees with pain    Scapular Mobility     Right Shoulder   Scapular Dyskinesis: grade II  Scapular Mobility with Shoulder to 90° FF   Scapular winging: minimal  Scapular elevation: moderate  Upward rotation: excessive    Strength/Myotome Testing     Right Shoulder     Planes of Motion   Flexion: 4   Extension: 4   Abduction: 4-   External rotation at 0°: 3+   Internal rotation at 0°: 4     Isolated Muscles   Lower trapezius: 3+   Posterior deltoid: 3+   Rhomboids: 3+   Upper trapezius: 4     Tests     Right Shoulder   Positive empty can and Hawkin's  Precautions: HTN    Daily Treatment Diary     Manual  8/1            PROM R shld all planes to ziggy nv                                                                    Exercise Diary  8/1            Bwd shld rolls 10x            Scap Sq 5"x10            Doorway Pec str 30"            Wall slides (flx,abd) 5"x10ea            Pulleys nv            UBE (F/B) nv            Supine wand (flx, abd, ER) nv            Bent over row 10x            Row w/tband             Ext w/ tband             Iso ER                                                                                                                                      Modalities              prn                                           9/18/18 Pt did not return for treatment after initial evaluation

## 2018-08-08 ENCOUNTER — APPOINTMENT (OUTPATIENT)
Dept: PHYSICAL THERAPY | Facility: CLINIC | Age: 63
End: 2018-08-08
Payer: COMMERCIAL

## 2018-08-15 ENCOUNTER — APPOINTMENT (OUTPATIENT)
Dept: PHYSICAL THERAPY | Facility: CLINIC | Age: 63
End: 2018-08-15
Payer: COMMERCIAL

## 2018-10-02 DIAGNOSIS — R10.13 DYSPEPSIA: ICD-10-CM

## 2018-10-02 RX ORDER — PANTOPRAZOLE SODIUM 20 MG/1
TABLET, DELAYED RELEASE ORAL
Qty: 90 TABLET | Refills: 1 | Status: SHIPPED | OUTPATIENT
Start: 2018-10-02 | End: 2019-05-02 | Stop reason: SDUPTHER

## 2018-10-10 ENCOUNTER — OFFICE VISIT (OUTPATIENT)
Dept: INTERNAL MEDICINE CLINIC | Facility: CLINIC | Age: 63
End: 2018-10-10
Payer: COMMERCIAL

## 2018-10-10 VITALS
WEIGHT: 210 LBS | HEIGHT: 63 IN | HEART RATE: 94 BPM | OXYGEN SATURATION: 96 % | DIASTOLIC BLOOD PRESSURE: 100 MMHG | TEMPERATURE: 99.9 F | SYSTOLIC BLOOD PRESSURE: 142 MMHG | BODY MASS INDEX: 37.21 KG/M2

## 2018-10-10 DIAGNOSIS — R10.13 DYSPEPSIA: ICD-10-CM

## 2018-10-10 DIAGNOSIS — E78.5 HYPERLIPIDEMIA, UNSPECIFIED HYPERLIPIDEMIA TYPE: ICD-10-CM

## 2018-10-10 DIAGNOSIS — I10 BENIGN ESSENTIAL HYPERTENSION: ICD-10-CM

## 2018-10-10 DIAGNOSIS — Z23 NEEDS FLU SHOT: ICD-10-CM

## 2018-10-10 DIAGNOSIS — K21.9 GASTROESOPHAGEAL REFLUX DISEASE WITHOUT ESOPHAGITIS: Primary | ICD-10-CM

## 2018-10-10 DIAGNOSIS — M48.061 SPINAL STENOSIS OF LUMBAR REGION, UNSPECIFIED WHETHER NEUROGENIC CLAUDICATION PRESENT: ICD-10-CM

## 2018-10-10 DIAGNOSIS — D68.59 HYPERCOAGULABLE STATE (HCC): ICD-10-CM

## 2018-10-10 DIAGNOSIS — Z86.718 HISTORY OF DVT (DEEP VEIN THROMBOSIS): ICD-10-CM

## 2018-10-10 DIAGNOSIS — Z12.31 VISIT FOR SCREENING MAMMOGRAM: ICD-10-CM

## 2018-10-10 DIAGNOSIS — Z72.0 TOBACCO ABUSE: ICD-10-CM

## 2018-10-10 DIAGNOSIS — K58.2 IRRITABLE BOWEL SYNDROME WITH BOTH CONSTIPATION AND DIARRHEA: ICD-10-CM

## 2018-10-10 DIAGNOSIS — M16.12 OSTEOARTHRITIS OF LEFT HIP, UNSPECIFIED OSTEOARTHRITIS TYPE: ICD-10-CM

## 2018-10-10 PROBLEM — F51.04 CHRONIC INSOMNIA: Status: ACTIVE | Noted: 2017-03-21

## 2018-10-10 PROBLEM — K58.9 IRRITABLE BOWEL SYNDROME: Status: ACTIVE | Noted: 2017-04-04

## 2018-10-10 PROBLEM — D68.52 PROTHROMBIN MUTATION (HCC): Status: ACTIVE | Noted: 2018-10-10

## 2018-10-10 PROCEDURE — 90471 IMMUNIZATION ADMIN: CPT

## 2018-10-10 PROCEDURE — 99214 OFFICE O/P EST MOD 30 MIN: CPT | Performed by: INTERNAL MEDICINE

## 2018-10-10 PROCEDURE — 90682 RIV4 VACC RECOMBINANT DNA IM: CPT

## 2018-10-10 RX ORDER — GABAPENTIN 100 MG/1
100 CAPSULE ORAL 3 TIMES DAILY
Qty: 90 CAPSULE | Refills: 1 | Status: SHIPPED | OUTPATIENT
Start: 2018-10-10 | End: 2018-11-21 | Stop reason: SDUPTHER

## 2018-10-10 RX ORDER — FLUOXETINE HYDROCHLORIDE 20 MG/1
20 CAPSULE ORAL DAILY
COMMUNITY
Start: 2018-10-02 | End: 2019-09-30 | Stop reason: SDUPTHER

## 2018-10-10 NOTE — PROGRESS NOTES
Assessment/Plan:         Diagnoses and all orders for this visit:      Hypertension somewhat on the higher side  Patient reports this to stress/ anxiety of coming to the office and back pain  Reassess in a month  Gastroesophageal reflux disease without esophagitis  -     CBC and differential  -     Comprehensive metabolic panel    Irritable bowel syndrome with both constipation and diarrhea  -     Lipid panel  -     TSH, 3rd generation  Follow-up with GI  Osteoarthritis of left hip, unspecified osteoarthritis type  Resume gabapentin  Hypercoagulable state ([facility] [address] )  -     Ambulatory referral to Hematology / Oncology; Future  -     Protein C activity  -     Prothrombin gene mutation  -     Protein S activity  -     MTHFR mutation  -     Antithrombin III Activity  -     Antithrombin III antigen  -     Factor 5 leiden  -     Homocysteine, serum  -     Lupus anticoagulant    Tobacco abuse  Tobacco cessation advised  Hyperlipidemia, unspecified hyperlipidemia type  -     Comprehensive metabolic panel  -     TSH, 3rd generation    Spinal stenosis of lumbar region, unspecified whether neurogenic claudication present  -     gabapentin (NEURONTIN) 100 mg capsule; Take 1 capsule (100 mg total) by mouth 3 (three) times a day    History of DVT (deep vein thrombosis)  -     Ambulatory referral to Hematology / Oncology; Future  -     Protein C activity  -     Prothrombin gene mutation  -     Protein S activity  -     MTHFR mutation  -     Antithrombin III Activity  -     Antithrombin III antigen  -     Factor 5 leiden  -     Homocysteine, serum  -     Lupus anticoagulant  See discussion above  Visit for screening mammogram  -     Mammo screening bilateral w 3d & cad; Future    Needs flu shot  -     influenza vaccine, [id number], quadrivalent, recombinant, PF, 0 5 mL, for patients 18 yr+ (FLUBLOK)    Other orders  -     FLUoxetine (PROzac) 20 mg capsule;  Take 20 mg by mouth daily         Subjective:      Patient ID: Davis Carrel is a 61 y o  female  HPI  Patient is here after several months of absence to follow-up on hypertension depression  She is been out of gabapentin for chronic back pain and reports much discomfort  She recently lost several friends which is making her quite depressed  She is taking fluoxetine 30 mg every day  Denies any suicidal thoughts  Occasionally drinks  We discussed resuming her pain medication gabapentin for chronic back pain  Reassess her symptoms in a month  She reports hip discomfort to be completely resolved after hip surgery  Patient has not gone for cardiac MRI for atrial lipoma  She reports her ulcerative colitis to be acting up  She has seen Dr Bradly Frias in the past   Recommend her to follow up with him  Patient is history of prothrombin gene mutation and had a DVT at one point as well as a pulmoanry embolism when she had a right hip replacement  She has seen Heme-Onc at that time and was told she does not need any long-term anticoagulation  There is family history of prothrombin gene mutation as well  She unfortunately continues to smoke half a pack a day  She had Dr Alonzo few years ago  The following portions of the patient's history were reviewed and updated as appropriate: allergies, current medications, past family history, past medical history, past social history, past surgical history and problem list     Review of Systems   Constitutional: Negative for chills and fever  Respiratory: Negative for cough and shortness of breath  Cardiovascular: Negative for chest pain and leg swelling  Gastrointestinal: Positive for abdominal pain, constipation and diarrhea  See above   Musculoskeletal: Positive for back pain  Negative for gait problem  See above   Neurological: Negative for dizziness and numbness  Psychiatric/Behavioral: Positive for dysphoric mood (See discussion above)           Current Outpatient Prescriptions:     acetaminophen-codeine (TYLENOL #3) 300-30 mg per tablet, Take 1-2 tablets by mouth every 6 (six) hours as needed for moderate pain for up to 10 doses, Disp: 10 tablet, Rfl: 0    atenolol (TENORMIN) 25 mg tablet, Take 2 tablets (50 mg total) by mouth every morning, Disp: 180 tablet, Rfl: 0    cycloSPORINE (RESTASIS) 0 05 % ophthalmic emulsion, 1 drop 2 (two) times a day , Disp: , Rfl:     FLUoxetine (PROZAC) 10 mg capsule, Take 3 capsules (30 mg total) by mouth daily, Disp: 90 capsule, Rfl: 1    FLUoxetine (PROzac) 20 mg capsule, Take 20 mg by mouth daily , Disp: , Rfl:     gabapentin (NEURONTIN) 100 mg capsule, Take 1 capsule (100 mg total) by mouth 3 (three) times a day, Disp: 90 capsule, Rfl: 1    nystatin (MYCOSTATIN) powder, Apply topically 2 (two) times a day, Disp: 15 g, Rfl: 0    olmesartan (BENICAR) 40 mg tablet, Take 1 tablet (40 mg total) by mouth daily, Disp: 90 tablet, Rfl: 1    pantoprazole (PROTONIX) 20 mg tablet, TAKE 1 TABLET DAILY, Disp: 90 tablet, Rfl: 1    zolpidem (AMBIEN) 10 mg tablet, Take 1 tablet (10 mg total) by mouth daily at bedtime, Disp: 90 tablet, Rfl: 0  Objective:      /100 (BP Location: Left arm, Patient Position: Sitting, Cuff Size: Extra-Large)   Pulse 94   Temp 99 9 °F (37 7 °C)   Ht 5' 3" (1 6 m)   Wt 95 3 kg (210 lb)   SpO2 96%   BMI 37 20 kg/m²          Physical Exam   Constitutional: She is oriented to person, place, and time  High BMI noted   HENT:   Head: Normocephalic  Mouth/Throat: No oropharyngeal exudate  Eyes: Conjunctivae are normal  No scleral icterus  Neck: No thyromegaly present  Cardiovascular: Normal rate, regular rhythm, normal heart sounds and intact distal pulses  No murmur heard  Pulmonary/Chest: Effort normal and breath sounds normal  No respiratory distress  She has no wheezes  She has no rales  Abdominal: Soft  Bowel sounds are normal  She exhibits no distension and no mass  There is tenderness (Diffuse  abdominal tenderness)   There is no rebound and no guarding  Musculoskeletal: She exhibits no edema  Lymphadenopathy:     She has no cervical adenopathy  Neurological: She is alert and oriented to person, place, and time  No cranial nerve deficit  Skin: Skin is warm  No rash noted  No erythema  Psychiatric: She has a normal mood and affect   Her behavior is normal  Judgment and thought content normal

## 2018-10-10 NOTE — LETTER
October 10, 2018     Patient: Ion Venegas   YOB: 1955   Date of Visit: 10/10/2018       To Whom it May Concern:    Pat Sender is under my professional care  She was seen in my office on 10/10/2018  She is excused from work 10/7/2018 through 10/10/2018   She may return to work 10/11/2018  If you have any questions or concerns, please don't hesitate to call           Sincerely,          Lm Weber MD        CC: No Recipients

## 2018-10-24 ENCOUNTER — APPOINTMENT (OUTPATIENT)
Dept: LAB | Facility: CLINIC | Age: 63
End: 2018-10-24
Payer: COMMERCIAL

## 2018-10-24 LAB
ALBUMIN SERPL BCP-MCNC: 3.4 G/DL (ref 3.5–5)
ALP SERPL-CCNC: 59 U/L (ref 46–116)
ALT SERPL W P-5'-P-CCNC: 62 U/L (ref 12–78)
ANION GAP SERPL CALCULATED.3IONS-SCNC: 7 MMOL/L (ref 4–13)
AST SERPL W P-5'-P-CCNC: 59 U/L (ref 5–45)
BASOPHILS # BLD AUTO: 0.11 THOUSANDS/ΜL (ref 0–0.1)
BASOPHILS NFR BLD AUTO: 2 % (ref 0–1)
BILIRUB SERPL-MCNC: 0.58 MG/DL (ref 0.2–1)
BILIRUB UR QL STRIP: NEGATIVE
BUN SERPL-MCNC: 14 MG/DL (ref 5–25)
CALCIUM SERPL-MCNC: 8.9 MG/DL (ref 8.3–10.1)
CHLORIDE SERPL-SCNC: 99 MMOL/L (ref 100–108)
CHOLEST SERPL-MCNC: 168 MG/DL (ref 50–200)
CLARITY UR: CLEAR
CO2 SERPL-SCNC: 28 MMOL/L (ref 21–32)
COLOR UR: NORMAL
CREAT SERPL-MCNC: 0.97 MG/DL (ref 0.6–1.3)
EOSINOPHIL # BLD AUTO: 0.3 THOUSAND/ΜL (ref 0–0.61)
EOSINOPHIL NFR BLD AUTO: 5 % (ref 0–6)
ERYTHROCYTE [DISTWIDTH] IN BLOOD BY AUTOMATED COUNT: 15.4 % (ref 11.6–15.1)
GFR SERPL CREATININE-BSD FRML MDRD: 62 ML/MIN/1.73SQ M
GLUCOSE P FAST SERPL-MCNC: 91 MG/DL (ref 65–99)
GLUCOSE UR STRIP-MCNC: NEGATIVE MG/DL
HCT VFR BLD AUTO: 44.6 % (ref 34.8–46.1)
HCYS SERPL-SCNC: 12 UMOL/L (ref 3.7–11.2)
HDLC SERPL-MCNC: 35 MG/DL (ref 40–60)
HGB BLD-MCNC: 14.1 G/DL (ref 11.5–15.4)
HGB UR QL STRIP.AUTO: NEGATIVE
IMM GRANULOCYTES # BLD AUTO: 0.01 THOUSAND/UL (ref 0–0.2)
IMM GRANULOCYTES NFR BLD AUTO: 0 % (ref 0–2)
KETONES UR STRIP-MCNC: NEGATIVE MG/DL
LDLC SERPL CALC-MCNC: 79 MG/DL (ref 0–100)
LEUKOCYTE ESTERASE UR QL STRIP: NEGATIVE
LYMPHOCYTES # BLD AUTO: 1.76 THOUSANDS/ΜL (ref 0.6–4.47)
LYMPHOCYTES NFR BLD AUTO: 30 % (ref 14–44)
MCH RBC QN AUTO: 30.9 PG (ref 26.8–34.3)
MCHC RBC AUTO-ENTMCNC: 31.6 G/DL (ref 31.4–37.4)
MCV RBC AUTO: 98 FL (ref 82–98)
MONOCYTES # BLD AUTO: 0.57 THOUSAND/ΜL (ref 0.17–1.22)
MONOCYTES NFR BLD AUTO: 10 % (ref 4–12)
NEUTROPHILS # BLD AUTO: 3.11 THOUSANDS/ΜL (ref 1.85–7.62)
NEUTS SEG NFR BLD AUTO: 53 % (ref 43–75)
NITRITE UR QL STRIP: NEGATIVE
NONHDLC SERPL-MCNC: 133 MG/DL
NRBC BLD AUTO-RTO: 0 /100 WBCS
PH UR STRIP.AUTO: 5.5 [PH] (ref 4.5–8)
PLATELET # BLD AUTO: 251 THOUSANDS/UL (ref 149–390)
PMV BLD AUTO: 11.3 FL (ref 8.9–12.7)
POTASSIUM SERPL-SCNC: 4.4 MMOL/L (ref 3.5–5.3)
PROT SERPL-MCNC: 7.4 G/DL (ref 6.4–8.2)
PROT UR STRIP-MCNC: NEGATIVE MG/DL
RBC # BLD AUTO: 4.56 MILLION/UL (ref 3.81–5.12)
SODIUM SERPL-SCNC: 134 MMOL/L (ref 136–145)
SP GR UR STRIP.AUTO: 1.02 (ref 1–1.03)
TRIGL SERPL-MCNC: 271 MG/DL
TSH SERPL DL<=0.05 MIU/L-ACNC: 1.61 UIU/ML (ref 0.36–3.74)
UROBILINOGEN UR QL STRIP.AUTO: 0.2 E.U./DL
WBC # BLD AUTO: 5.86 THOUSAND/UL (ref 4.31–10.16)

## 2018-10-24 PROCEDURE — 80053 COMPREHEN METABOLIC PANEL: CPT | Performed by: INTERNAL MEDICINE

## 2018-10-24 PROCEDURE — 36415 COLL VENOUS BLD VENIPUNCTURE: CPT | Performed by: INTERNAL MEDICINE

## 2018-10-24 PROCEDURE — 83090 ASSAY OF HOMOCYSTEINE: CPT | Performed by: INTERNAL MEDICINE

## 2018-10-24 PROCEDURE — 85670 THROMBIN TIME PLASMA: CPT | Performed by: INTERNAL MEDICINE

## 2018-10-24 PROCEDURE — 81240 F2 GENE: CPT | Performed by: INTERNAL MEDICINE

## 2018-10-24 PROCEDURE — 84443 ASSAY THYROID STIM HORMONE: CPT | Performed by: INTERNAL MEDICINE

## 2018-10-24 PROCEDURE — 85732 THROMBOPLASTIN TIME PARTIAL: CPT | Performed by: INTERNAL MEDICINE

## 2018-10-24 PROCEDURE — 85303 CLOT INHIBIT PROT C ACTIVITY: CPT | Performed by: INTERNAL MEDICINE

## 2018-10-24 PROCEDURE — 85301 ANTITHROMBIN III ANTIGEN: CPT | Performed by: INTERNAL MEDICINE

## 2018-10-24 PROCEDURE — 81291 MTHFR GENE: CPT | Performed by: INTERNAL MEDICINE

## 2018-10-24 PROCEDURE — 80061 LIPID PANEL: CPT | Performed by: INTERNAL MEDICINE

## 2018-10-24 PROCEDURE — 85705 THROMBOPLASTIN INHIBITION: CPT | Performed by: INTERNAL MEDICINE

## 2018-10-24 PROCEDURE — 85025 COMPLETE CBC W/AUTO DIFF WBC: CPT | Performed by: INTERNAL MEDICINE

## 2018-10-24 PROCEDURE — 82306 VITAMIN D 25 HYDROXY: CPT | Performed by: INTERNAL MEDICINE

## 2018-10-24 PROCEDURE — 85306 CLOT INHIBIT PROT S FREE: CPT | Performed by: INTERNAL MEDICINE

## 2018-10-24 PROCEDURE — 85300 ANTITHROMBIN III ACTIVITY: CPT | Performed by: INTERNAL MEDICINE

## 2018-10-24 PROCEDURE — 85613 RUSSELL VIPER VENOM DILUTED: CPT | Performed by: INTERNAL MEDICINE

## 2018-10-24 PROCEDURE — 81003 URINALYSIS AUTO W/O SCOPE: CPT | Performed by: INTERNAL MEDICINE

## 2018-10-24 PROCEDURE — 81241 F5 GENE: CPT | Performed by: INTERNAL MEDICINE

## 2018-10-25 LAB
DEPRECATED AT III PPP: 89 % OF NORMAL (ref 92–136)
PROT C AG ACT/NOR PPP IA: 94 % OF NORMAL (ref 60–150)

## 2018-10-26 LAB
APTT SCREEN TO CONFIRM RATIO: 1.11 RATIO (ref 0–1.4)
AT III AG ACT/NOR PPP IA: 80 % (ref 72–124)
CONFIRM APTT/NORMAL: 59.6 SEC (ref 0–55)
LA PPP-IMP: ABNORMAL
PROT S ACT/NOR PPP: 101 % (ref 63–140)
SCREEN APTT: 32.9 SEC (ref 0–51.9)
SCREEN DRVVT: 42.6 SEC (ref 0–47)
THROMBIN TIME: 16.5 SEC (ref 0–23)

## 2018-10-27 LAB
25(OH)D2 SERPL-MCNC: 1.8 NG/ML
25(OH)D3 SERPL-MCNC: 12 NG/ML
25(OH)D3+25(OH)D2 SERPL-MCNC: 14 NG/ML

## 2018-10-29 LAB
F2 GENE MUT ANL BLD/T: ABNORMAL
F5 GENE MUT ANL BLD/T: NORMAL

## 2018-10-30 LAB — MTHFR GENE MUT ANL BLD/T: NORMAL

## 2018-10-31 ENCOUNTER — OFFICE VISIT (OUTPATIENT)
Dept: HEMATOLOGY ONCOLOGY | Facility: CLINIC | Age: 63
End: 2018-10-31
Payer: COMMERCIAL

## 2018-10-31 VITALS
BODY MASS INDEX: 37.74 KG/M2 | HEIGHT: 63 IN | RESPIRATION RATE: 16 BRPM | OXYGEN SATURATION: 97 % | TEMPERATURE: 99.7 F | DIASTOLIC BLOOD PRESSURE: 80 MMHG | HEART RATE: 75 BPM | SYSTOLIC BLOOD PRESSURE: 140 MMHG | WEIGHT: 213 LBS

## 2018-10-31 DIAGNOSIS — Z86.718 HISTORY OF DVT (DEEP VEIN THROMBOSIS): ICD-10-CM

## 2018-10-31 DIAGNOSIS — D68.59 HYPERCOAGULABLE STATE (HCC): ICD-10-CM

## 2018-10-31 DIAGNOSIS — D68.52 PROTHROMBIN MUTATION (HCC): Primary | ICD-10-CM

## 2018-10-31 PROCEDURE — 99243 OFF/OP CNSLTJ NEW/EST LOW 30: CPT | Performed by: PHYSICIAN ASSISTANT

## 2018-10-31 NOTE — PROGRESS NOTES
Oncology Outpatient F/U Note  Rogerio Elam 61 y o  female MRN: @ Encounter: [id number]        Date:  10/31/2018      Assessment/ Plan:    1  History of DVT 2010 while actively smoking on OCPs treated with anticoagulation  2   History of PE 12/2015 after right hip replacement  3   Heterozygous for prothrombin gene mutation  4  S/P 4/2016 L hip replacement  Bridged from coumadin to Lovenox  IVC filter placed presurgery and removed 2 months later  Coumadin discontinued summer 2016  No recurrence of DVT  5   Daily tobacco use 6/day  States she doesn't have intention of quitting completely at this time  We discussed that her prior clotting events were provoked but having heterozygous prothrombin gene mutation does increase her risk for recurrent clotting event 3-4 fold over the general population  We had a discussion regarding anticoagulation options  As she has been off anticoagulation for 2 years and was able to successfully undergo Left hip replacement 4/2016 with successful perioperative precautions to avoid clotting event  She is in favor of not taking prescription anticoagulation  We mutually agreed for Elaina Huang to take ASA 81mg po daily  We discussed that her risk of recurrent pulmonary embolism is higher than that of the general population given her heterozygous prothrombin gene mutation and at the 1st sign of any possible DVT or pulmonary embolism she is to seek medical attention  She verbalized understanding  At this time, we will see her prn  Any issues or concerns, she is asked to call our office  Discussed with Dr Dl Jalloh  CC:  "Gene mutation - 3rd generation "      HPI:  Rogerio Elam is a 61 y o  seen10/31/2018 at the referral of Graciela Mishra MD regarding heterozygous prothrombin gene mutation and history of DVT in 2010 and pulmonary embolism 12/2015 after she had a right hip replacement  2/2016 she met with Dr Dl Jalloh    It was noted that she had a history or RLE DVT 5/2010 when she was on OCPs and smoking  Her mother has a history of DVT  Karina Hoffman was tested at [facility] and was found to have prothrombin gene mutation, heterozygous  She was treated with Lovenox and then Coumadin and then discontinued  She underwent right total hip replacement in December 6330 complicated with small pulmonary embolus in the right lower lobe and was on coumadin  Recommendations at the 2/2016 office visit were that the "patient to continue Coumadin and prior to her elective date of left hip replacement I suggest strongly IVC filter by IR and bridged the patient with Lovenox 40 mg for 3 days and stop one day before the surgery and then resume Lovenox at 40 mg after the surgery and a bridge with Coumadin to keep INR between 2 and 3 for at least 3 more months  #6  IVC filter could be retrieved within 90 days after the surgery by IR "    4/13/2016 she had LEFT total hip arthoplasty  "Given her hx of a PE, an IVC filter was placed prior to her surgery that same day  Postoperatively she was started on coumadin with a lovenox bridge and sequential compression pumps as well as JAYA stockings were used for DVT prophylaxis"    6/2016 she had IVC filter removed  She established care with Dr Jane Cantor 3/2017  "She has finished her Coumadin for 3 months  It appears she had a DVT several years ago that was unprovoked "    She had a history of hysterectomy, right atrial lipoma, obesity, degenerative arthritis, depression  She has UC for which she sees Dr Yajaira Coles  Will be going for her mammogram in December 2018  Feels stable medically  No new issues or concerns  Chronic arthralgias  States she was very happy when she was told she did not have to take any additional coumadin/"blood thinners "  She used to take ASA 81mg po daily but has not in some time        Test Results:      Labs:   Lab Results   Component Value Date    HGB 14 1 10/24/2018    HCT 44 6 10/24/2018 MCV 98 10/24/2018     10/24/2018    WBC 5 86 10/24/2018    NRBC 0 10/24/2018     Lab Results   Component Value Date     (L) 10/24/2018    K 4 4 10/24/2018    CL 99 (L) 10/24/2018    CO2 28 10/24/2018    ANIONGAP 8 12/31/2015    BUN 14 10/24/2018    CREATININE 0 97 10/24/2018    GLUCOSE 120 12/31/2015    GLUF 91 10/24/2018    CALCIUM 8 9 10/24/2018    AST 59 (H) 10/24/2018    ALT 62 10/24/2018    ALKPHOS 59 10/24/2018    EGFR 62 10/24/2018           Imaging:   No results found  ROS: As mentioned in HPI & Interval History otherwise 14 point ROS negative  Active Problems:   Patient Active Problem List   Diagnosis    Osteoarthritis of left hip    Benign essential hypertension    Chronic insomnia    Depressive disorder    Dyslipidemia    Dyspepsia    Gastroesophageal reflux disease without esophagitis    Hip arthritis    Hyperlipidemia    Irritable bowel syndrome    Hypercoagulable state ([address] )    Spinal stenosis of lumbar region    Tobacco abuse    Vitamin D deficiency    Prothrombin mutation ([facility] [address] )       Past Medical History:   Past Medical History:   Diagnosis Date    Acid reflux     Anxiety     Arthritis     Back pain     Carpal tunnel syndrome, bilateral     Depression     Hiatal hernia     History of DVT of lower extremity     Right leg-had IVc filter  removal today-6/6/2016    History of pulmonary embolism     History of uterine cancer     Was stage 3- uterine, ovaries-hysterectomy    Hypercholesteremia     Hypertension     Left leg pain     Lumbar herniated disc     Tumor cells, benign     In right atrium   Probably congenital     Varicose vein of leg     Wears glasses        Surgical History:   Past Surgical History:   Procedure Laterality Date    COLONOSCOPY      DENTAL SURGERY      DILATION AND CURETTAGE OF UTERUS      ESOPHAGOGASTRODUODENOSCOPY      FOOT SURGERY      LAST ASSESSED: [id number]    HYSTERECTOMY      uterine cancer-hx    JOINT REPLACEMENT      b/l   THR    VA COLONOSCOPY FLX DX W/COLLJ SPEC WHEN PFRMD N/A 6/14/2017    Procedure: EGD AND COLONOSCOPY;  Surgeon: Rj Mccabe MD;  Location: [facility]; Service: Gastroenterology    VA TOTAL HIP ARTHROPLASTY Left 4/13/2016    Procedure: ARTHROPLASTY HIP TOTAL;  Surgeon: Howie Kumar DO;  Location: [facility];  Service: Orthopedics    TONSILLECTOMY AND ADENOIDECTOMY      TOTAL HIP ARTHROPLASTY Right     LAST ASSESSED: [id number]       Family History:    Family History   Problem Relation Age of Onset    Heart failure Father     Arthritis Family        Cancer-related family history is not on file  Social History:   Social History     Social History    Marital status:      Spouse name: N/A    Number of children: 1    Years of education: N/A     Occupational History    Not on file  Social History Main Topics    Smoking status: Current Every Day Smoker     Packs/day: 0 20     Years: 20 00     Types: Cigarettes    Smokeless tobacco: Never Used      Comment: Smokes 5 cigarettes daily,hx age 12 to 2012-2ppd    Alcohol use Yes      Comment: 2 or 3 monthly    Drug use: No    Sexual activity: Not on file     Other Topics Concern    Not on file     Social History Narrative    Coffee    Daily caffeinated cola consumption           Current Medications:   Current Outpatient Prescriptions   Medication Sig Dispense Refill    acetaminophen-codeine (TYLENOL #3) 300-30 mg per tablet Take 1-2 tablets by mouth every 6 (six) hours as needed for moderate pain for up to 10 doses 10 tablet 0    atenolol (TENORMIN) 25 mg tablet Take 2 tablets (50 mg total) by mouth every morning 180 tablet 0    cycloSPORINE (RESTASIS) 0 05 % ophthalmic emulsion 1 drop 2 (two) times a day        FLUoxetine (PROZAC) 10 mg capsule Take 3 capsules (30 mg total) by mouth daily 90 capsule 1    FLUoxetine (PROzac) 20 mg capsule Take 20 mg by mouth daily       gabapentin (NEURONTIN) 100 mg capsule Take 1 capsule (100 mg total) by mouth 3 (three) times a day 90 capsule 1    nystatin (MYCOSTATIN) powder Apply topically 2 (two) times a day 15 g 0    olmesartan (BENICAR) 40 mg tablet Take 1 tablet (40 mg total) by mouth daily 90 tablet 1    pantoprazole (PROTONIX) 20 mg tablet TAKE 1 TABLET DAILY 90 tablet 1    zolpidem (AMBIEN) 10 mg tablet Take 1 tablet (10 mg total) by mouth daily at bedtime 90 tablet 0     No current facility-administered medications for this visit  Allergies: Allergies   Allergen Reactions    Percocet [Oxycodone-Acetaminophen] Hives    Amlodipine     Percocet  [Oxycodone-Acetaminophen]          Physical Exam:    There is no height or weight on file to calculate BSA  Ht Readings from Last 3 Encounters:   10/10/18 5' 3" (1 6 m)   07/18/18 5' 3" (1 6 m)   12/11/17 5' 4" (1 626 m)       Wt Readings from Last 3 Encounters:   10/10/18 95 3 kg (210 lb)   07/18/18 94 8 kg (209 lb)   12/11/17 102 kg (225 lb)        Temp Readings from Last 3 Encounters:   10/10/18 99 9 °F (37 7 °C)   06/30/18 98 2 °F (36 8 °C) (Oral)   06/14/17 98 1 °F (36 7 °C) (Oral)        BP Readings from Last 3 Encounters:   10/10/18 142/100   06/30/18 151/99   12/11/17 122/74         Pulse Readings from Last 3 Encounters:   10/10/18 94   06/30/18 90   12/11/17 83         Physical Exam    Physical Exam   Constitutional: She is oriented to person, place, and time  She appears well-developed and well-nourished  No distress  HENT:   Head: Normocephalic and atraumatic  Nose: Nose normal    Mouth/Throat: Oropharynx is clear and moist    Eyes: Pupils are equal, round, and reactive to light  Conjunctivae and EOM are normal  No scleral icterus  Neck: Normal range of motion  Neck supple  No tracheal deviation present  No thyromegaly present  Cardiovascular: Normal rate, regular rhythm, normal heart sounds and intact distal pulses  Exam reveals no gallop and no friction rub  No murmur heard    Pulmonary/Chest: Effort normal and breath sounds normal  No stridor  No respiratory distress  She has no wheezes  She has no rales  She exhibits no tenderness  Abdominal: Soft  Bowel sounds are normal  She exhibits no distension and no mass  There is no tenderness  There is no rebound and no guarding  Musculoskeletal: Normal range of motion  She exhibits no edema, tenderness or deformity  Lymphadenopathy:     She has no cervical adenopathy  Neurological: She is alert and oriented to person, place, and time  No cranial nerve deficit  Coordination normal    Skin: Skin is warm and dry  No rash noted  She is not diaphoretic  No erythema  No pallor  Psychiatric: She has a normal mood and affect  Her behavior is normal  Judgment and thought content normal            Goals and Barriers:  Current Goal:  Further evaluate reason for consultation  Barriers: None  Patient's Capacity to Self Care:  Patient is able to self care        Emergency Contacts:    [facility], [phone],     Code Status: [unfilled]  Advance Directive and Living Will:      Power of :    POLST:

## 2018-11-05 DIAGNOSIS — F51.01 PRIMARY INSOMNIA: ICD-10-CM

## 2018-11-05 RX ORDER — ZOLPIDEM TARTRATE 10 MG/1
10 TABLET ORAL
Qty: 90 TABLET | Refills: 0 | Status: SHIPPED | OUTPATIENT
Start: 2018-11-05 | End: 2018-11-14 | Stop reason: SDUPTHER

## 2018-11-05 RX ORDER — ZOLPIDEM TARTRATE 10 MG/1
10 TABLET ORAL
Qty: 90 TABLET | Refills: 0 | Status: CANCELLED | OUTPATIENT
Start: 2018-11-05

## 2018-11-14 DIAGNOSIS — F51.01 PRIMARY INSOMNIA: ICD-10-CM

## 2018-11-14 RX ORDER — ZOLPIDEM TARTRATE 10 MG/1
10 TABLET ORAL
Qty: 90 TABLET | Refills: 0 | OUTPATIENT
Start: 2018-11-14 | End: 2018-11-21 | Stop reason: SDUPTHER

## 2018-11-14 NOTE — TELEPHONE ENCOUNTER
Patient called and requested a medication refill for Zolpidem 10 mg tablets-1 tablet daily at bedtime #90 and she would like it called into [facility] [phone]  She said that she called last week and requested this refill and it was called into the pharmacy yet    Please call to advise the patient [phone]

## 2018-11-21 ENCOUNTER — OFFICE VISIT (OUTPATIENT)
Dept: FAMILY MEDICINE CLINIC | Facility: CLINIC | Age: 63
End: 2018-11-21
Payer: COMMERCIAL

## 2018-11-21 VITALS
DIASTOLIC BLOOD PRESSURE: 100 MMHG | WEIGHT: 213 LBS | HEART RATE: 81 BPM | SYSTOLIC BLOOD PRESSURE: 130 MMHG | OXYGEN SATURATION: 97 % | HEIGHT: 63 IN | BODY MASS INDEX: 37.74 KG/M2

## 2018-11-21 DIAGNOSIS — E55.9 VITAMIN D DEFICIENCY: ICD-10-CM

## 2018-11-21 DIAGNOSIS — M48.061 SPINAL STENOSIS OF LUMBAR REGION, UNSPECIFIED WHETHER NEUROGENIC CLAUDICATION PRESENT: ICD-10-CM

## 2018-11-21 DIAGNOSIS — K58.2 IRRITABLE BOWEL SYNDROME WITH BOTH CONSTIPATION AND DIARRHEA: Primary | ICD-10-CM

## 2018-11-21 DIAGNOSIS — I10 BENIGN ESSENTIAL HYPERTENSION: ICD-10-CM

## 2018-11-21 DIAGNOSIS — F51.01 PRIMARY INSOMNIA: ICD-10-CM

## 2018-11-21 PROCEDURE — 3008F BODY MASS INDEX DOCD: CPT | Performed by: INTERNAL MEDICINE

## 2018-11-21 PROCEDURE — 99214 OFFICE O/P EST MOD 30 MIN: CPT | Performed by: INTERNAL MEDICINE

## 2018-11-21 RX ORDER — ERGOCALCIFEROL 1.25 MG/1
50000 CAPSULE ORAL WEEKLY
Qty: 8 CAPSULE | Refills: 0 | Status: SHIPPED | OUTPATIENT
Start: 2018-11-21 | End: 2019-01-15 | Stop reason: SDUPTHER

## 2018-11-21 RX ORDER — GABAPENTIN 300 MG/1
300 CAPSULE ORAL 3 TIMES DAILY
Qty: 90 CAPSULE | Refills: 1 | Status: SHIPPED | OUTPATIENT
Start: 2018-11-21 | End: 2019-03-19 | Stop reason: SDUPTHER

## 2018-11-21 RX ORDER — ZOLPIDEM TARTRATE 10 MG/1
10 TABLET ORAL
Qty: 90 TABLET | Refills: 0 | Status: SHIPPED | OUTPATIENT
Start: 2018-11-21 | End: 2019-03-27 | Stop reason: SDUPTHER

## 2018-11-21 RX ORDER — ZOLPIDEM TARTRATE 10 MG/1
10 TABLET ORAL
Qty: 90 TABLET | Refills: 0 | OUTPATIENT
Start: 2018-11-21 | End: 2018-11-21 | Stop reason: SDUPTHER

## 2018-11-21 NOTE — PROGRESS NOTES
Assessment/Plan:         Diagnoses and all orders for this visit:    Irritable bowel syndrome with both constipation and diarrhea   probiotic increase fiber  Benign essential hypertension    diastolicblood pressure is elevated  Salt restriction weight loss diet modification re-evaluate in 3 months  Primary insomnia  -       zolpidem (AMBIEN) 10 mg tablet; Take 1 tablet (10 mg total) by mouth daily at bedtime    Spinal stenosis of lumbar region, unspecified whether neurogenic claudication present  -     gabapentin (NEURONTIN) 300 mg capsule; Take 1 capsule (300 mg total) by mouth 3 (three) times a day    Vitamin D deficiency  -     ergocalciferol (VITAMIN D2) 50,000 units; Take 1 capsule (50,000 Units total) by mouth once a week        Subjective:      Patient ID: Jignesh Parikh is a 61 y o  female  Patient history of hypertension chronic back pain prothrombin mutation IBS is here to follow-up on chronic medical problems  She reports she had a flare-up of her IBS and had to take a couple of days off from work  She would like a note for that as well  She had colonoscopy Dr Craig Schooling recently  She will also be contacting him  Encouraged her to take probiotics and add more fiber to the diet  She does admit that she does not watch her diet closely and she has been consuming a lot of salty food  I did point out her blood pressure specifically diastolic was elevated  She does report gabapentin helps with chronic back pain  She would like to increase it back to 300 3 times a day  New prescription would be given to her and patient was slowly tapered up to 300 mg 3 times a day  She was also seen by Hematology Oncology  Given her prothrombin mutation thromboembolic phenomena she is advised to use aspirin a day          The following portions of the patient's history were reviewed and updated as appropriate: allergies, current medications, past family history, past medical history, past social history, past surgical history and problem list     Review of Systems      Objective:      /100 (BP Location: Left arm, Patient Position: Sitting, Cuff Size: Extra-Large)   Pulse 81   Ht 5' 3" (1 6 m)   Wt 96 6 kg (213 lb)   SpO2 97%   BMI 37 73 kg/m²          Physical Exam   Constitutional: She is oriented to person, place, and time  No distress  HENT:   Mouth/Throat: No oropharyngeal exudate  Somewhat crowded posterior pharynx   Eyes: Conjunctivae are normal  No scleral icterus  Cardiovascular: Normal rate, regular rhythm and normal heart sounds  No murmur heard  Pulmonary/Chest: Effort normal and breath sounds normal  No respiratory distress  She has no wheezes  She has no rales  Abdominal: Bowel sounds are normal  She exhibits no distension  There is no tenderness  There is no rebound and no guarding  Obese nontender   Musculoskeletal: She exhibits no edema  Lymphadenopathy:     She has no cervical adenopathy  Neurological: She is alert and oriented to person, place, and time  Psychiatric: She has a normal mood and affect   Her behavior is normal

## 2018-11-21 NOTE — LETTER
November 21, 2018     Patient: Jordan Edmonds   YOB: 1955   Date of Visit: 11/21/2018       To Whom it May Concern:    Leeann Uriostegui is under my professional care  She was seen in my office on 11/21/2018  Please excuse 11/18/2018 and 11/19/2018  If you have any questions or concerns, please don't hesitate to call           Sincerely,          Michael Councilman, MD        CC: No Recipients

## 2019-01-02 ENCOUNTER — TELEPHONE (OUTPATIENT)
Dept: FAMILY MEDICINE CLINIC | Facility: CLINIC | Age: 64
End: 2019-01-02

## 2019-01-02 DIAGNOSIS — L71.9 ROSACEA: Primary | ICD-10-CM

## 2019-01-02 RX ORDER — METRONIDAZOLE 10 MG/G
GEL TOPICAL DAILY
Qty: 45 G | Refills: 0 | Status: SHIPPED | OUTPATIENT
Start: 2019-01-02 | End: 2021-10-23 | Stop reason: HOSPADM

## 2019-01-02 NOTE — TELEPHONE ENCOUNTER
Patient called and requested a medication refill for a topical face cream for her Rosacea  She would like it called into [facility] [phone]    If there is any questions the patient can be reached at [phone]

## 2019-01-08 ENCOUNTER — OFFICE VISIT (OUTPATIENT)
Dept: FAMILY MEDICINE CLINIC | Facility: CLINIC | Age: 64
End: 2019-01-08
Payer: COMMERCIAL

## 2019-01-08 ENCOUNTER — HOSPITAL ENCOUNTER (OUTPATIENT)
Dept: RADIOLOGY | Facility: HOSPITAL | Age: 64
Discharge: HOME | End: 2019-01-08
Payer: COMMERCIAL

## 2019-01-08 VITALS
HEART RATE: 107 BPM | SYSTOLIC BLOOD PRESSURE: 140 MMHG | WEIGHT: 212 LBS | DIASTOLIC BLOOD PRESSURE: 90 MMHG | HEIGHT: 63 IN | BODY MASS INDEX: 37.56 KG/M2 | TEMPERATURE: 100.7 F | OXYGEN SATURATION: 94 %

## 2019-01-08 DIAGNOSIS — J20.9 ACUTE BRONCHITIS, UNSPECIFIED ORGANISM: Primary | ICD-10-CM

## 2019-01-08 DIAGNOSIS — Z72.0 TOBACCO ABUSE: ICD-10-CM

## 2019-01-08 DIAGNOSIS — J20.9 ACUTE BRONCHITIS, UNSPECIFIED ORGANISM: ICD-10-CM

## 2019-01-08 PROCEDURE — 99213 OFFICE O/P EST LOW 20 MIN: CPT | Performed by: INTERNAL MEDICINE

## 2019-01-08 PROCEDURE — 3008F BODY MASS INDEX DOCD: CPT | Performed by: INTERNAL MEDICINE

## 2019-01-08 PROCEDURE — 71046 X-RAY EXAM CHEST 2 VIEWS: CPT

## 2019-01-08 RX ORDER — BENZONATATE 100 MG/1
100 CAPSULE ORAL 3 TIMES DAILY PRN
Qty: 20 CAPSULE | Refills: 0 | Status: SHIPPED | OUTPATIENT
Start: 2019-01-08 | End: 2019-01-14 | Stop reason: SDUPTHER

## 2019-01-08 RX ORDER — AZITHROMYCIN 250 MG/1
TABLET, FILM COATED ORAL
Qty: 6 TABLET | Refills: 0 | Status: SHIPPED | OUTPATIENT
Start: 2019-01-08 | End: 2019-01-12

## 2019-01-08 NOTE — PROGRESS NOTES
Assessment/Plan:         Diagnoses and all orders for this visit:    Acute bronchitis, unspecified organism  -     XR chest pa & lateral; Future  -     azithromycin (ZITHROMAX) 250 mg tablet; 2 pill first day and one pill 2-5 th day  -     benzonatate (TESSALON PERLES) 100 mg capsule; Take 1 capsule (100 mg total) by mouth 3 (three) times a day as needed for cough    Increase fluid intake, rest, saline gargles, saline nasal irrigation, NSAID/tylenol as tolerated  Follow up if symptoms getting worse or seek immediate medical help for emergency  Pt understands the plan  Subjective:      Patient ID: Marco Antonio Licona is a 61 y o  female  Fatigue   This is a new problem  Episode onset: 5days  Associated symptoms include abdominal pain, chills, coughing, fatigue, a fever and headaches  Pertinent negatives include no chest pain  Headache    Associated symptoms include abdominal pain, coughing and a fever  Pertinent negatives include no dizziness  (Ex smoker)   Cough   This is a new problem  Episode onset: 5 days  The problem has been rapidly worsening  The problem occurs constantly  The cough is productive of purulent sputum  Associated symptoms include chills, a fever, headaches, shortness of breath and wheezing  Pertinent negatives include no chest pain  She has tried ipratropium inhaler for the symptoms  The treatment provided mild relief  ex smoker    patient did get influenza vaccine    The following portions of the patient's history were reviewed and updated as appropriate: allergies, current medications, past medical history, past social history and problem list     Review of Systems   Constitutional: Positive for chills, fatigue and fever  Respiratory: Positive for cough, shortness of breath and wheezing  Cardiovascular: Negative for chest pain  Gastrointestinal: Positive for abdominal pain  Neurological: Positive for headaches  Negative for dizziness           Objective:      /90 (BP Location: Right arm, Patient Position: Sitting, Cuff Size: Large)   Pulse (!) 107   Temp (!) 100 7 °F (38 2 °C) (Tympanic)   Ht 5' 3" (1 6 m)   Wt 96 2 kg (212 lb)   SpO2 94%   BMI 37 55 kg/m²          Physical Exam   Constitutional: She is oriented to person, place, and time  Sounds congested   HENT:   Erythematous throat   Cardiovascular: Normal rate and normal heart sounds  No murmur heard  Pulmonary/Chest: No respiratory distress  She has no wheezes  She has no rales  Decreased breath sounds bilaterally   Musculoskeletal: She exhibits no edema  Neurological: She is alert and oriented to person, place, and time

## 2019-01-10 ENCOUNTER — TELEPHONE (OUTPATIENT)
Dept: FAMILY MEDICINE CLINIC | Facility: CLINIC | Age: 64
End: 2019-01-10

## 2019-01-14 DIAGNOSIS — J20.9 ACUTE BRONCHITIS, UNSPECIFIED ORGANISM: ICD-10-CM

## 2019-01-14 RX ORDER — BENZONATATE 100 MG/1
100 CAPSULE ORAL 3 TIMES DAILY PRN
Qty: 20 CAPSULE | Refills: 0 | Status: SHIPPED | OUTPATIENT
Start: 2019-01-14 | End: 2019-01-21 | Stop reason: SDUPTHER

## 2019-01-15 DIAGNOSIS — E55.9 VITAMIN D DEFICIENCY: ICD-10-CM

## 2019-01-15 RX ORDER — ERGOCALCIFEROL 1.25 MG/1
CAPSULE ORAL
Qty: 8 CAPSULE | Refills: 0 | Status: SHIPPED | OUTPATIENT
Start: 2019-01-15 | End: 2019-12-18 | Stop reason: ALTCHOICE

## 2019-01-17 DIAGNOSIS — I15.9 SECONDARY HYPERTENSION: ICD-10-CM

## 2019-01-17 RX ORDER — OLMESARTAN MEDOXOMIL 40 MG/1
TABLET ORAL
Qty: 90 TABLET | Refills: 1 | Status: SHIPPED | OUTPATIENT
Start: 2019-01-17 | End: 2019-06-30 | Stop reason: SDUPTHER

## 2019-01-21 DIAGNOSIS — J20.9 ACUTE BRONCHITIS, UNSPECIFIED ORGANISM: ICD-10-CM

## 2019-01-21 RX ORDER — BENZONATATE 100 MG/1
100 CAPSULE ORAL 3 TIMES DAILY PRN
Qty: 20 CAPSULE | Refills: 0 | Status: SHIPPED | OUTPATIENT
Start: 2019-01-21 | End: 2019-11-27 | Stop reason: SDUPTHER

## 2019-01-21 NOTE — TELEPHONE ENCOUNTER
Patient called and requested a medication refill forbenzonatate (TESSALON PERLES) 100 mg capsule-Take 1 capsule (100 mg total) by mouth 3 (three) times a day as needed for cough #20   She would like this called into [facility] [address]  [phone]

## 2019-02-03 DIAGNOSIS — M48.061 SPINAL STENOSIS OF LUMBAR REGION, UNSPECIFIED WHETHER NEUROGENIC CLAUDICATION PRESENT: ICD-10-CM

## 2019-02-04 RX ORDER — GABAPENTIN 100 MG/1
CAPSULE ORAL
Qty: 90 CAPSULE | Refills: 1 | Status: SHIPPED | OUTPATIENT
Start: 2019-02-04 | End: 2019-06-20

## 2019-02-13 ENCOUNTER — HOSPITAL ENCOUNTER (OUTPATIENT)
Dept: RADIOLOGY | Age: 64
Discharge: HOME | End: 2019-02-13
Payer: COMMERCIAL

## 2019-02-13 VITALS — WEIGHT: 198 LBS | HEIGHT: 63 IN | BODY MASS INDEX: 35.08 KG/M2

## 2019-02-13 DIAGNOSIS — Z12.31 VISIT FOR SCREENING MAMMOGRAM: ICD-10-CM

## 2019-02-13 PROCEDURE — 77067 SCR MAMMO BI INCL CAD: CPT

## 2019-02-13 PROCEDURE — 77063 BREAST TOMOSYNTHESIS BI: CPT

## 2019-02-14 ENCOUNTER — TELEPHONE (OUTPATIENT)
Dept: FAMILY MEDICINE CLINIC | Facility: CLINIC | Age: 64
End: 2019-02-14

## 2019-03-03 DIAGNOSIS — F32.A DEPRESSION, UNSPECIFIED DEPRESSION TYPE: ICD-10-CM

## 2019-03-04 RX ORDER — FLUOXETINE 10 MG/1
TABLET, FILM COATED ORAL
Qty: 90 TABLET | Refills: 1 | Status: SHIPPED | OUTPATIENT
Start: 2019-03-04 | End: 2019-06-19 | Stop reason: SDUPTHER

## 2019-03-04 RX ORDER — FLUOXETINE HYDROCHLORIDE 20 MG/1
CAPSULE ORAL
Qty: 90 CAPSULE | Refills: 1 | Status: SHIPPED | OUTPATIENT
Start: 2019-03-04 | End: 2019-08-22 | Stop reason: SDUPTHER

## 2019-03-19 DIAGNOSIS — M48.061 SPINAL STENOSIS OF LUMBAR REGION, UNSPECIFIED WHETHER NEUROGENIC CLAUDICATION PRESENT: ICD-10-CM

## 2019-03-19 RX ORDER — GABAPENTIN 300 MG/1
CAPSULE ORAL
Qty: 90 CAPSULE | Refills: 1 | Status: SHIPPED | OUTPATIENT
Start: 2019-03-19 | End: 2019-06-20

## 2019-03-27 DIAGNOSIS — I15.9 SECONDARY HYPERTENSION: ICD-10-CM

## 2019-03-27 DIAGNOSIS — F51.01 PRIMARY INSOMNIA: ICD-10-CM

## 2019-03-27 RX ORDER — ATENOLOL 25 MG/1
TABLET ORAL
Qty: 180 TABLET | Refills: 0 | Status: SHIPPED | OUTPATIENT
Start: 2019-03-27 | End: 2019-06-30 | Stop reason: SDUPTHER

## 2019-03-27 NOTE — TELEPHONE ENCOUNTER
Patient called and requested a medication refill for zolpidem (AMBIEN) 10 mg tablet-take 1 tablet (10 mg total) by mouth daily at bedtime #90  Please call this medication into [facility] [phone]

## 2019-03-28 RX ORDER — ZOLPIDEM TARTRATE 10 MG/1
10 TABLET ORAL
Qty: 90 TABLET | Refills: 0 | Status: SHIPPED | OUTPATIENT
Start: 2019-03-28 | End: 2019-07-18 | Stop reason: SDUPTHER

## 2019-05-02 DIAGNOSIS — R10.13 DYSPEPSIA: ICD-10-CM

## 2019-05-02 RX ORDER — PANTOPRAZOLE SODIUM 20 MG/1
TABLET, DELAYED RELEASE ORAL
Qty: 90 TABLET | Refills: 1 | Status: SHIPPED | OUTPATIENT
Start: 2019-05-02 | End: 2019-09-28 | Stop reason: SDUPTHER

## 2019-06-19 DIAGNOSIS — F32.A DEPRESSION, UNSPECIFIED DEPRESSION TYPE: ICD-10-CM

## 2019-06-19 RX ORDER — FLUOXETINE 10 MG/1
TABLET, FILM COATED ORAL
Qty: 90 TABLET | Refills: 1 | Status: SHIPPED | OUTPATIENT
Start: 2019-06-19 | End: 2019-08-22 | Stop reason: SDUPTHER

## 2019-06-20 DIAGNOSIS — M48.061 SPINAL STENOSIS OF LUMBAR REGION, UNSPECIFIED WHETHER NEUROGENIC CLAUDICATION PRESENT: ICD-10-CM

## 2019-06-20 RX ORDER — GABAPENTIN 300 MG/1
300 CAPSULE ORAL 3 TIMES DAILY
Qty: 270 CAPSULE | Refills: 0 | Status: SHIPPED | OUTPATIENT
Start: 2019-06-20 | End: 2019-09-30 | Stop reason: SDUPTHER

## 2019-06-30 DIAGNOSIS — I15.9 SECONDARY HYPERTENSION: ICD-10-CM

## 2019-07-01 RX ORDER — ATENOLOL 25 MG/1
TABLET ORAL
Qty: 180 TABLET | Refills: 0 | Status: SHIPPED | OUTPATIENT
Start: 2019-07-01 | End: 2019-09-11 | Stop reason: SDUPTHER

## 2019-07-01 RX ORDER — OLMESARTAN MEDOXOMIL 40 MG/1
TABLET ORAL
Qty: 90 TABLET | Refills: 1 | Status: SHIPPED | OUTPATIENT
Start: 2019-07-01 | End: 2019-12-27 | Stop reason: SDUPTHER

## 2019-07-18 DIAGNOSIS — F51.01 PRIMARY INSOMNIA: ICD-10-CM

## 2019-07-18 RX ORDER — ZOLPIDEM TARTRATE 10 MG/1
10 TABLET ORAL
Qty: 90 TABLET | Refills: 0 | Status: SHIPPED | OUTPATIENT
Start: 2019-07-18 | End: 2019-10-15 | Stop reason: SDUPTHER

## 2019-08-22 ENCOUNTER — OFFICE VISIT (OUTPATIENT)
Dept: FAMILY MEDICINE CLINIC | Facility: CLINIC | Age: 64
End: 2019-08-22
Payer: COMMERCIAL

## 2019-08-22 VITALS
SYSTOLIC BLOOD PRESSURE: 138 MMHG | WEIGHT: 208 LBS | HEIGHT: 63 IN | BODY MASS INDEX: 36.86 KG/M2 | DIASTOLIC BLOOD PRESSURE: 88 MMHG | TEMPERATURE: 99.7 F

## 2019-08-22 DIAGNOSIS — J40 BRONCHITIS: Primary | ICD-10-CM

## 2019-08-22 PROCEDURE — 3008F BODY MASS INDEX DOCD: CPT | Performed by: FAMILY MEDICINE

## 2019-08-22 PROCEDURE — 99213 OFFICE O/P EST LOW 20 MIN: CPT | Performed by: FAMILY MEDICINE

## 2019-08-22 RX ORDER — LEVOFLOXACIN 500 MG/1
500 TABLET, FILM COATED ORAL EVERY 24 HOURS
Qty: 7 TABLET | Refills: 0 | Status: SHIPPED | OUTPATIENT
Start: 2019-08-22 | End: 2019-08-29

## 2019-08-22 NOTE — PROGRESS NOTES
Assessment/Plan:       Diagnoses and all orders for this visit:    Bronchitis  -     levofloxacin (LEVAQUIN) 500 mg tablet; Take 1 tablet (500 mg total) by mouth every 24 hours for 7 days            Subjective:        Patient ID: Klarissa Vivas is a 59 y o  female  Patient is a year with left thoracolumbar pain on the left  Patient with history of pleurisy  Patient has had pain for the past 4 days  Patient with nasal congestion  The patient is coughing intermittently  Patient does use pillow cancer back which does seem to help slightly  The patient did have chills on Sunday and Monday  Patient with rhinorrhea but this is chronic in nature  Patient does use Zyrtec  Patient also with history of PEs  No leg swelling or pain        The following portions of the patient's history were reviewed and updated as appropriate: allergies, current medications, past family history, past medical history, past social history, past surgical history and problem list       Review of Systems   Constitutional: Positive for chills and fever  HENT: Positive for congestion, postnasal drip and rhinorrhea  Eyes: Negative  Respiratory: Positive for shortness of breath  Cardiovascular: Positive for chest pain  Gastrointestinal: Negative  Endocrine: Negative  Genitourinary: Negative  Musculoskeletal: Positive for back pain  Skin: Negative  Allergic/Immunologic: Negative  Neurological: Negative  Hematological: Negative  Psychiatric/Behavioral: Negative  Objective:    /88 (BP Location: Right arm, Patient Position: Sitting, Cuff Size: Large)   Temp 99 7 °F (37 6 °C) (Tympanic)   Ht 5' 3" (1 6 m)   Wt 94 3 kg (208 lb)   BMI 36 85 kg/m²          Physical Exam   Constitutional: She appears well-developed and well-nourished  No distress  HENT:   Head: Normocephalic  Right Ear: External ear normal    Left Ear: External ear normal    Mouth/Throat: Oropharyngeal exudate present  Eyes: Pupils are equal, round, and reactive to light  EOM are normal  Right eye exhibits no discharge  Left eye exhibits no discharge  No scleral icterus  Neck: Normal range of motion  Neck supple  No thyromegaly present  Cardiovascular: Normal rate, regular rhythm, normal heart sounds and intact distal pulses  Exam reveals no gallop and no friction rub  No murmur heard  Pulmonary/Chest: Effort normal  No respiratory distress  She has no wheezes  She has no rales  She exhibits no tenderness  Rhonchi at left base and right base   Musculoskeletal: Normal range of motion  She exhibits no edema or tenderness  Lymphadenopathy:     She has no cervical adenopathy  Neurological: She is alert  No cranial nerve deficit  She exhibits normal muscle tone  Coordination normal    Skin: Skin is warm and dry  No rash noted  She is not diaphoretic  No erythema  No pallor  Psychiatric: She has a normal mood and affect  Her behavior is normal  Judgment and thought content normal    Nursing note and vitals reviewed

## 2019-09-11 DIAGNOSIS — I15.9 SECONDARY HYPERTENSION: ICD-10-CM

## 2019-09-11 RX ORDER — ATENOLOL 25 MG/1
TABLET ORAL
Qty: 180 TABLET | Refills: 4 | Status: SHIPPED | OUTPATIENT
Start: 2019-09-11 | End: 2020-08-31 | Stop reason: SDUPTHER

## 2019-09-13 ENCOUNTER — TELEPHONE (OUTPATIENT)
Dept: FAMILY MEDICINE CLINIC | Facility: CLINIC | Age: 64
End: 2019-09-13

## 2019-09-13 DIAGNOSIS — M76.60 ACHILLES TENDON PAIN: Primary | ICD-10-CM

## 2019-09-13 NOTE — TELEPHONE ENCOUNTER
Patient has Achilles pain  She said you had recommended someone in the past but she can not find paper you wrote it on  Can we refer her somewhere

## 2019-09-25 ENCOUNTER — APPOINTMENT (OUTPATIENT)
Dept: RADIOLOGY | Facility: CLINIC | Age: 64
End: 2019-09-25
Payer: COMMERCIAL

## 2019-09-25 ENCOUNTER — OFFICE VISIT (OUTPATIENT)
Dept: PODIATRY | Facility: CLINIC | Age: 64
End: 2019-09-25
Payer: COMMERCIAL

## 2019-09-25 VITALS
SYSTOLIC BLOOD PRESSURE: 132 MMHG | DIASTOLIC BLOOD PRESSURE: 70 MMHG | HEIGHT: 63 IN | WEIGHT: 204 LBS | BODY MASS INDEX: 36.14 KG/M2

## 2019-09-25 DIAGNOSIS — M24.875 OTHER SPECIFIC JOINT DERANGEMENTS LEFT FOOT, NOT ELSEWHERE CLASSIFIED: ICD-10-CM

## 2019-09-25 DIAGNOSIS — M21.42 ACQUIRED PES PLANUS, LEFT: Primary | ICD-10-CM

## 2019-09-25 DIAGNOSIS — M21.42 ACQUIRED PES PLANUS, LEFT: ICD-10-CM

## 2019-09-25 DIAGNOSIS — M76.822 POSTERIOR TIBIAL TENDINITIS OF LEFT LOWER EXTREMITY: ICD-10-CM

## 2019-09-25 DIAGNOSIS — M25.572 SINUS TARSI SYNDROME, LEFT: ICD-10-CM

## 2019-09-25 PROCEDURE — 20600 DRAIN/INJ JOINT/BURSA W/O US: CPT | Performed by: PODIATRIST

## 2019-09-25 PROCEDURE — 73610 X-RAY EXAM OF ANKLE: CPT

## 2019-09-25 PROCEDURE — 73630 X-RAY EXAM OF FOOT: CPT

## 2019-09-25 PROCEDURE — 99203 OFFICE O/P NEW LOW 30 MIN: CPT | Performed by: PODIATRIST

## 2019-09-25 RX ORDER — LIDOCAINE HYDROCHLORIDE 10 MG/ML
1 INJECTION, SOLUTION INFILTRATION; PERINEURAL
Status: DISCONTINUED | OUTPATIENT
Start: 2019-09-25 | End: 2021-10-23 | Stop reason: HOSPADM

## 2019-09-25 RX ORDER — TRIAMCINOLONE ACETONIDE 40 MG/ML
20 INJECTION, SUSPENSION INTRA-ARTICULAR; INTRAMUSCULAR ONCE
Status: DISCONTINUED | OUTPATIENT
Start: 2019-09-25 | End: 2019-09-25

## 2019-09-25 RX ORDER — TRIAMCINOLONE ACETONIDE 40 MG/ML
20 INJECTION, SUSPENSION INTRA-ARTICULAR; INTRAMUSCULAR
Status: DISCONTINUED | OUTPATIENT
Start: 2019-09-25 | End: 2021-10-23 | Stop reason: HOSPADM

## 2019-09-25 RX ORDER — LIDOCAINE HYDROCHLORIDE 10 MG/ML
2 INJECTION, SOLUTION EPIDURAL; INFILTRATION; INTRACAUDAL; PERINEURAL ONCE
Status: DISCONTINUED | OUTPATIENT
Start: 2019-09-25 | End: 2019-09-25

## 2019-09-25 RX ADMIN — TRIAMCINOLONE ACETONIDE 20 MG: 40 INJECTION, SUSPENSION INTRA-ARTICULAR; INTRAMUSCULAR at 10:48

## 2019-09-25 RX ADMIN — LIDOCAINE HYDROCHLORIDE 1 ML: 10 INJECTION, SOLUTION INFILTRATION; PERINEURAL at 10:48

## 2019-09-25 NOTE — PROGRESS NOTES
PATIENT:  Jatinder Perez  1955       ASSESSMENT:     1  Acquired pes planus, left  XR foot 3+ vw left   2  Sinus tarsi syndrome, left  Small joint arthrocentesis: L subtalar    lidocaine (PF) (XYLOCAINE-MPF) 1 % injection 2 mL    triamcinolone acetonide (KENALOG-40) 40 mg/mL injection 20 mg   3  Posterior tibial tendinitis of left lower extremity  XR ankle 3+ vw left   4  Other specific joint derangements left foot, not elsewhere classified  XR foot 3+ vw left    Small joint arthrocentesis: L subtalar    lidocaine (PF) (XYLOCAINE-MPF) 1 % injection 2 mL    triamcinolone acetonide (KENALOG-40) 40 mg/mL injection 20 mg             PLAN:  Patient was counseled and educated on the condition and the diagnosis  X-ray was obtained and personally reviewed  The radiological findings were discussed with the patient  She has 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  She also has PTTD  The diagnosis, treatment options and prognosis were discussed with the patient  Treatment options were discussed and the patient wished to proceed with sinus tarsi injection  Injection was given as in the procedure  Instructed supportive care, home exercise, icing, and proper footwear/ arch support  Discussed possible AFO  Also discussed surgical options  Patient will return in 3 weeks for re-evaluation       Small joint arthrocentesis: L subtalar  Date/Time: 9/25/2019 10:48 AM  Consent given by: patient  Site marked: site marked  Timeout: Immediately prior to procedure a time out was called to verify the correct patient, procedure, equipment, support staff and site/side marked as required   Supporting Documentation  Indications: pain   Procedure Details  Location: foot - L subtalar  Needle size: 25 G  Ultrasound guidance: no  Medications administered: 20 mg triamcinolone acetonide 40 mg/mL; 1 mL lidocaine 1 %    Patient tolerance: patient tolerated the procedure well with no immediate complications              Subjective:       HPI  The patient presents with chief complaint of left ankle pain for about 9 months  The symptoms presents around left ankle with throbbing sensation  Pain level is about 8-9 out of 10  The symptoms  have been worse since the onset  Pain increases with walking and standing  The patient does not recall any injury  The patient tried OTC meds, and different shoes without relieving the pain  Denied any swelling  No associated numbness or paresthesia  She walks with a cane  The following portions of the patient's history were reviewed and updated as appropriate: allergies, current medications, past family history, past medical history, past social history, past surgical history and problem list   All pertinent labs and images were reviewed  Past Medical History  Past Medical History:   Diagnosis Date    Acid reflux     Anxiety     Arthritis     Back pain     Carpal tunnel syndrome, bilateral     Depression     Endometrial cancer ([facility] [address] ) 1997    Hiatal hernia     History of DVT of lower extremity     Right leg-had IVc filter  removal today-6/6/2016    History of pulmonary embolism     History of uterine cancer     Was stage 3- uterine, ovaries-hysterectomy    Hypercholesteremia     Hypertension     Left leg pain     Lumbar herniated disc     Tumor cells, benign     In right atrium   Probably congenital     Varicose vein of leg     Wears glasses        Past Surgical History  Past Surgical History:   Procedure Laterality Date    COLONOSCOPY      DENTAL SURGERY      DILATION AND CURETTAGE OF UTERUS      ESOPHAGOGASTRODUODENOSCOPY      FOOT SURGERY      LAST ASSESSED: [id number]    HYSTERECTOMY  1997    uterine cancer-hx    JOINT REPLACEMENT      b/l   THR    OOPHORECTOMY Bilateral 1997    MS COLONOSCOPY FLX DX W/COLLJ SPEC WHEN PFRMD N/A 6/14/2017    Procedure: EGD AND COLONOSCOPY;  Surgeon: Anel Torres MD;  Location: [facility]; Service: Gastroenterology    [place] TOTAL HIP ARTHROPLASTY Left 4/13/2016    Procedure: ARTHROPLASTY HIP TOTAL;  Surgeon: Rupert Wynne DO;  Location: [facility];  Service: Orthopedics    TONSILLECTOMY AND ADENOIDECTOMY      TOTAL HIP ARTHROPLASTY Right     LAST ASSESSED: [id number]        Allergies:  Percocet [oxycodone-acetaminophen]; Amlodipine; and Percocet  [oxycodone-acetaminophen]    Medications:  Current Outpatient Medications   Medication Sig Dispense Refill    atenolol (TENORMIN) 25 mg tablet TAKE 2 TABLETS EVERY MORNING 180 tablet 4    benzonatate (TESSALON PERLES) 100 mg capsule Take 1 capsule (100 mg total) by mouth 3 (three) times a day as needed for cough 20 capsule 0    cycloSPORINE (RESTASIS) 0 05 % ophthalmic emulsion 1 drop 2 (two) times a day        FLUoxetine (PROZAC) 10 mg capsule Take 3 capsules (30 mg total) by mouth daily 90 capsule 1    FLUoxetine (PROzac) 20 mg capsule Take 20 mg by mouth daily       gabapentin (NEURONTIN) 300 mg capsule Take 1 capsule (300 mg total) by mouth 3 (three) times a day 270 capsule 0    metroNIDAZOLE (METROGEL) 1 % gel Apply topically daily 45 g 0    nystatin (MYCOSTATIN) powder Apply topically 2 (two) times a day 15 g 0    olmesartan (BENICAR) 40 mg tablet TAKE 1 TABLET DAILY 90 tablet 1    pantoprazole (PROTONIX) 20 mg tablet TAKE 1 TABLET DAILY 90 tablet 1    zolpidem (AMBIEN) 10 mg tablet Take 1 tablet (10 mg total) by mouth daily at bedtime 90 tablet 0    acetaminophen-codeine (TYLENOL #3) 300-30 mg per tablet Take 1-2 tablets by mouth every 6 (six) hours as needed for moderate pain for up to 10 doses (Patient not taking: Reported on 8/22/2019) 10 tablet 0    ergocalciferol (VITAMIN D2) 50,000 units TAKE ONE CAPSULE BY MOUTH ONE TIME PER WEEK (Patient not taking: Reported on 8/22/2019) 8 capsule 0     Current Facility-Administered Medications   Medication Dose Route Frequency Provider Last Rate Last Dose    lidocaine (PF) (XYLOCAINE-MPF) 1 % injection 2 mL  2 mL Injection Once Doy Idol, DPM        triamcinolone acetonide (KENALOG-40) 40 mg/mL injection 20 mg  20 mg Subcutaneous Once Doy Idol, DPM           Social History:  Social History     Socioeconomic History    Marital status:      Spouse name: None    Number of children: 1    Years of education: None    Highest education level: None   Occupational History    None   Social Needs    Financial resource strain: None    Food insecurity:     Worry: None     Inability: None    Transportation needs:     Medical: None     Non-medical: None   Tobacco Use    Smoking status: Current Every Day Smoker     Packs/day: 0 20     Years: 20 00     Pack years: 4 00     Types: Cigarettes    Smokeless tobacco: Never Used    Tobacco comment: Smokes 5 cigarettes daily,hx age 12 to 2012-2ppd   Substance and Sexual Activity    Alcohol use: Yes     Comment: 2 or 3 monthly    Drug use: No    Sexual activity: Never   Lifestyle    Physical activity:     Days per week: None     Minutes per session: None    Stress: None   Relationships    Social connections:     Talks on phone: None     Gets together: None     Attends L[employer] day service: None     Active member of club or organization: None     Attends meetings of clubs or organizations: None     Relationship status: None    Intimate partner violence:     Fear of current or ex partner: None     Emotionally abused: None     Physically abused: None     Forced sexual activity: None   Other Topics Concern    None   Social History Narrative    Coffee    Daily caffeinated cola consumption          Review of Systems   Constitutional: Negative for chills and fever  Respiratory: Negative for cough and shortness of breath  Cardiovascular: Negative for chest pain  Gastrointestinal: Negative for nausea and vomiting  Skin: Negative for wound  Neurological: Negative for numbness  Hematological: Does not bruise/bleed easily     Psychiatric/Behavioral: Negative for behavioral problems  Objective:      /70   Ht 5' 3" (1 6 m)   Wt 92 5 kg (204 lb)   BMI 36 14 kg/m²          Physical Exam   Constitutional: She is oriented to person, place, and time  She appears well-developed and well-nourished  No distress  HENT:   Head: Normocephalic and atraumatic  Neck: Normal range of motion  Neck supple  Cardiovascular: Normal rate, regular rhythm and intact distal pulses  Pulmonary/Chest: Effort normal and breath sounds normal  No respiratory distress  Musculoskeletal:   Pes planus noted left foot  Pain left sinus tarsi with decreased STJ ROM  Tenderness noted at left PTT with weak heel rise  Tenderness also noted around medial gutter left ankle  No acute edema  Bunion and hammertoe noted right foot  Neurological: She is alert and oriented to person, place, and time  No sensory deficit  Skin: Capillary refill takes less than 2 seconds  No rash noted  No erythema  Psychiatric: She has a normal mood and affect  Her behavior is normal    Vitals reviewed

## 2019-09-28 DIAGNOSIS — R10.13 DYSPEPSIA: ICD-10-CM

## 2019-09-30 DIAGNOSIS — M48.061 SPINAL STENOSIS OF LUMBAR REGION, UNSPECIFIED WHETHER NEUROGENIC CLAUDICATION PRESENT: ICD-10-CM

## 2019-09-30 RX ORDER — GABAPENTIN 300 MG/1
CAPSULE ORAL
Qty: 270 CAPSULE | Refills: 4 | Status: SHIPPED | OUTPATIENT
Start: 2019-09-30 | End: 2020-04-15 | Stop reason: SDUPTHER

## 2019-09-30 RX ORDER — PANTOPRAZOLE SODIUM 20 MG/1
TABLET, DELAYED RELEASE ORAL
Qty: 90 TABLET | Refills: 4 | Status: SHIPPED | OUTPATIENT
Start: 2019-09-30 | End: 2020-12-05 | Stop reason: SDUPTHER

## 2019-09-30 RX ORDER — FLUOXETINE HYDROCHLORIDE 20 MG/1
CAPSULE ORAL
Qty: 90 CAPSULE | Refills: 4 | Status: SHIPPED | OUTPATIENT
Start: 2019-09-30 | End: 2020-04-16 | Stop reason: SDUPTHER

## 2019-10-15 DIAGNOSIS — F51.01 PRIMARY INSOMNIA: ICD-10-CM

## 2019-10-15 RX ORDER — ZOLPIDEM TARTRATE 10 MG/1
10 TABLET ORAL
Qty: 90 TABLET | Refills: 0 | Status: SHIPPED | OUTPATIENT
Start: 2019-10-15 | End: 2020-04-15 | Stop reason: SDUPTHER

## 2019-10-16 ENCOUNTER — TELEPHONE (OUTPATIENT)
Dept: PODIATRY | Facility: CLINIC | Age: 64
End: 2019-10-16

## 2019-10-16 NOTE — TELEPHONE ENCOUNTER
Returned patient's message that she needed to cancel today's appointment  LM explaining to call the office when she's ready to reschedule

## 2019-11-27 ENCOUNTER — HOSPITAL ENCOUNTER (OUTPATIENT)
Dept: RADIOLOGY | Facility: HOSPITAL | Age: 64
Discharge: HOME | End: 2019-11-27
Payer: COMMERCIAL

## 2019-11-27 ENCOUNTER — OFFICE VISIT (OUTPATIENT)
Dept: FAMILY MEDICINE CLINIC | Facility: CLINIC | Age: 64
End: 2019-11-27
Payer: COMMERCIAL

## 2019-11-27 VITALS
WEIGHT: 209 LBS | HEIGHT: 63 IN | HEART RATE: 86 BPM | SYSTOLIC BLOOD PRESSURE: 140 MMHG | OXYGEN SATURATION: 98 % | BODY MASS INDEX: 37.03 KG/M2 | DIASTOLIC BLOOD PRESSURE: 78 MMHG | TEMPERATURE: 97.1 F

## 2019-11-27 DIAGNOSIS — K58.2 IRRITABLE BOWEL SYNDROME WITH BOTH CONSTIPATION AND DIARRHEA: ICD-10-CM

## 2019-11-27 DIAGNOSIS — I10 BENIGN ESSENTIAL HYPERTENSION: ICD-10-CM

## 2019-11-27 DIAGNOSIS — Z13.29 SCREENING FOR THYROID DISORDER: ICD-10-CM

## 2019-11-27 DIAGNOSIS — K21.9 GASTROESOPHAGEAL REFLUX DISEASE WITHOUT ESOPHAGITIS: ICD-10-CM

## 2019-11-27 DIAGNOSIS — E78.5 DYSLIPIDEMIA: ICD-10-CM

## 2019-11-27 DIAGNOSIS — Z13.89 SCREENING FOR GENITOURINARY CONDITION: ICD-10-CM

## 2019-11-27 DIAGNOSIS — Z72.0 TOBACCO ABUSE: ICD-10-CM

## 2019-11-27 DIAGNOSIS — J20.9 ACUTE BRONCHITIS, UNSPECIFIED ORGANISM: Primary | ICD-10-CM

## 2019-11-27 DIAGNOSIS — J20.9 ACUTE BRONCHITIS, UNSPECIFIED ORGANISM: ICD-10-CM

## 2019-11-27 DIAGNOSIS — Z13.21 ENCOUNTER FOR VITAMIN DEFICIENCY SCREENING: ICD-10-CM

## 2019-11-27 PROCEDURE — 99213 OFFICE O/P EST LOW 20 MIN: CPT | Performed by: INTERNAL MEDICINE

## 2019-11-27 PROCEDURE — 71046 X-RAY EXAM CHEST 2 VIEWS: CPT

## 2019-11-27 RX ORDER — AZITHROMYCIN 250 MG/1
TABLET, FILM COATED ORAL
Qty: 6 TABLET | Refills: 0 | Status: SHIPPED | OUTPATIENT
Start: 2019-11-27 | End: 2019-12-01

## 2019-11-27 RX ORDER — ALBUTEROL SULFATE 90 UG/1
2 AEROSOL, METERED RESPIRATORY (INHALATION) EVERY 6 HOURS PRN
Qty: 1 INHALER | Refills: 5 | Status: SHIPPED | OUTPATIENT
Start: 2019-11-27 | End: 2021-10-23 | Stop reason: HOSPADM

## 2019-11-27 RX ORDER — BENZONATATE 100 MG/1
100 CAPSULE ORAL 3 TIMES DAILY PRN
Qty: 20 CAPSULE | Refills: 0 | Status: SHIPPED | OUTPATIENT
Start: 2019-11-27 | End: 2020-04-15 | Stop reason: SDUPTHER

## 2019-11-27 NOTE — PROGRESS NOTES
Assessment/Plan:         Diagnoses and all orders for this visit:    Acute bronchitis, unspecified organism  -     XR chest pa & lateral; Future  -     azithromycin (ZITHROMAX) 250 mg tablet; 2 pill first day and one pill 2-5 th day  -     fluticasone-salmeterol (ADVAIR, [address]) 250-50 mcg/dose inhaler; Inhale 1 puff 2 (two) times a day Rinse mouth after use  -     albuterol (PROVENTIL HFA,VENTOLIN HFA) 90 mcg/act inhaler; Inhale 2 puffs every 6 (six) hours as needed for wheezing or shortness of breath  -     benzonatate (TESSALON PERLES) 100 mg capsule; Take 1 capsule (100 mg total) by mouth 3 (three) times a day as needed for cough  Increase fluid intake, rest, NSAID/tylenol as tolerated  Follow up if symptoms getting worse or seek immediate medical help for emergency  Pt understands the plan  Tobacco abuse    Gastroesophageal reflux disease without esophagitis  -     CBC and differential  -     Comprehensive metabolic panel    Irritable bowel syndrome with both constipation and diarrhea    Benign essential hypertension  -     Lipid panel    Dyslipidemia  -     Lipid panel        Screening for thyroid disorder  -     TSH, 3rd generation with Free T4 reflex    Screening for genitourinary condition  -     Urinalysis with microscopic    Encounter for vitamin deficiency screening  -     Vitamin D 25 hydroxy      Patient was seen back in 3 weeks after lab studies  Subjective:      Patient ID: Keila Woodson is a 59 y o  female  Cough   This is a new problem  The current episode started in the past 7 days  The problem has been unchanged  The problem occurs constantly  The cough is productive of purulent sputum  Associated symptoms include a fever, myalgias, nasal congestion, postnasal drip, a sore throat and wheezing  Pertinent negatives include no chills, shortness of breath or sweats  Her past medical history is significant for COPD      Smokes a pack a day at least    The following portions of the patient's history were reviewed and updated as appropriate: allergies, current medications, past medical history, past social history and problem list     Review of Systems   Constitutional: Positive for fever  Negative for chills  HENT: Positive for postnasal drip and sore throat  Respiratory: Positive for cough and wheezing  Negative for shortness of breath  Gastrointestinal: Positive for diarrhea  Musculoskeletal: Positive for myalgias  Objective:      /78 (BP Location: Left arm, Patient Position: Sitting, Cuff Size: Extra-Large)   Pulse 86   Temp (!) 97 1 °F (36 2 °C)   Ht 5' 3" (1 6 m)   Wt 94 8 kg (209 lb)   SpO2 98%   BMI 37 02 kg/m²          Physical Exam   Constitutional: She appears distressed  Looks tired   HENT:   Minimal throat erythema   Cardiovascular: Normal rate, regular rhythm and normal heart sounds  Pulmonary/Chest: Effort normal  No stridor  No respiratory distress  She has wheezes  Neurological: She is alert  Skin: No pallor

## 2019-12-18 ENCOUNTER — OFFICE VISIT (OUTPATIENT)
Dept: FAMILY MEDICINE CLINIC | Facility: CLINIC | Age: 64
End: 2019-12-18
Payer: COMMERCIAL

## 2019-12-18 VITALS
DIASTOLIC BLOOD PRESSURE: 116 MMHG | OXYGEN SATURATION: 98 % | WEIGHT: 204 LBS | SYSTOLIC BLOOD PRESSURE: 160 MMHG | HEART RATE: 86 BPM | BODY MASS INDEX: 36.14 KG/M2 | HEIGHT: 63 IN

## 2019-12-18 DIAGNOSIS — M54.50 ACUTE MIDLINE LOW BACK PAIN WITHOUT SCIATICA: ICD-10-CM

## 2019-12-18 DIAGNOSIS — I10 ACCELERATED HYPERTENSION: ICD-10-CM

## 2019-12-18 DIAGNOSIS — Z72.0 TOBACCO ABUSE: ICD-10-CM

## 2019-12-18 DIAGNOSIS — M62.830 MUSCLE SPASM OF BACK: Primary | ICD-10-CM

## 2019-12-18 PROCEDURE — 3008F BODY MASS INDEX DOCD: CPT | Performed by: INTERNAL MEDICINE

## 2019-12-18 PROCEDURE — 99214 OFFICE O/P EST MOD 30 MIN: CPT | Performed by: INTERNAL MEDICINE

## 2019-12-18 RX ORDER — KETOROLAC TROMETHAMINE 30 MG/ML
30 INJECTION, SOLUTION INTRAMUSCULAR; INTRAVENOUS ONCE
Status: COMPLETED | OUTPATIENT
Start: 2019-12-18 | End: 2019-12-18

## 2019-12-18 RX ORDER — CYCLOBENZAPRINE HCL 5 MG
5 TABLET ORAL 2 TIMES DAILY
Qty: 10 TABLET | Refills: 0 | Status: SHIPPED | OUTPATIENT
Start: 2019-12-18 | End: 2021-06-25 | Stop reason: SDUPTHER

## 2019-12-18 RX ADMIN — KETOROLAC TROMETHAMINE 30 MG: 30 INJECTION, SOLUTION INTRAMUSCULAR; INTRAVENOUS at 15:54

## 2019-12-18 NOTE — PROGRESS NOTES
Assessment/Plan:         Diagnoses and all orders for this visit:    Muscle spasm of back  -     cyclobenzaprine (FLEXERIL) 5 mg tablet; Take 1 tablet (5 mg total) by mouth 2 (two) times a day  -     ketorolac (TORADOL) injection 30 mg   if back pain does not get better in a week or 2 I would order chest x-ray given patient's history of under well cancer and tobacco use  Accelerated hypertension    Possibility of  reactive hypertension  Patient is on medication has been compliant with medication  Follow-up in 1-2 weeks for reassessment of blood pressure  Acute midline low back pain without sciatica    Tobacco abuse        Subjective:      Patient ID: Rafy Garcia is a 59 y o  female  HPI   patient is here for acute visit complaining of acute back pain that started today  She will return some what wrong way and began to have acute onset of back pain  She has actual for several years and does get a flare-up every once a while  Denies any numbness and tingling in her feet  Denies any fevers and chills  Denies any dysuria frequency hematuria  She is thinking about putting warm compresses to help with back discomfort  Unfortunate, patient has not gone for lab studies all year this year  Her blood pressure is somewhat on the higher side but she believes is secondary to pain that she is experiencing  I encouraged her to follow up with me in a couple of weeks to reassess her blood pressure as well as review lab studies which I am hoping she will get before next visit  She has cut back her smoking to half pack a day and hoping to drop and even more so  She did overcome her  recent episode of bronchitis well chest x-ray was negative    The following portions of the patient's history were reviewed and updated as appropriate: allergies, current medications, past family history, past medical history, past social history, past surgical history and problem list     Review of Systems   Constitutional: Negative for chills and fever  Respiratory: Positive for cough  Negative for shortness of breath  Cardiovascular: Negative for chest pain and leg swelling  Genitourinary: Negative for difficulty urinating, dysuria, flank pain and hematuria  Musculoskeletal:        As above   Neurological: Negative for dizziness  Objective:      BP (!) 160/116 (BP Location: Left arm, Patient Position: Sitting, Cuff Size: Extra-Large)   Pulse 86   Ht 5' 3" (1 6 m)   Wt 92 5 kg (204 lb)   SpO2 98%   BMI 36 14 kg/m²          Physical Exam   Cardiovascular: Normal rate, regular rhythm and normal heart sounds  No murmur heard  Pulmonary/Chest: Effort normal and breath sounds normal  No stridor  No respiratory distress  She has no wheezes  She has no rales  Musculoskeletal: She exhibits deformity (Increased lumbar lordosis increased paraspinal muscle spasm)  She exhibits no edema  Negative straight leg raise   Neurological: She is alert  Psychiatric: She has a normal mood and affect   Her behavior is normal

## 2019-12-27 DIAGNOSIS — I15.9 SECONDARY HYPERTENSION: ICD-10-CM

## 2019-12-27 RX ORDER — OLMESARTAN MEDOXOMIL 40 MG/1
TABLET ORAL
Qty: 90 TABLET | Refills: 4 | Status: SHIPPED | OUTPATIENT
Start: 2019-12-27 | End: 2020-08-31 | Stop reason: SDUPTHER

## 2020-01-09 DIAGNOSIS — F32.A DEPRESSIVE DISORDER: Primary | ICD-10-CM

## 2020-01-09 RX ORDER — FLUOXETINE 10 MG/1
TABLET, FILM COATED ORAL
Qty: 90 TABLET | Refills: 0 | Status: SHIPPED | OUTPATIENT
Start: 2020-01-09 | End: 2020-04-08

## 2020-02-21 ENCOUNTER — APPOINTMENT (OUTPATIENT)
Dept: LAB | Facility: CLINIC | Age: 65
End: 2020-02-21
Payer: COMMERCIAL

## 2020-02-21 LAB
25(OH)D3 SERPL-MCNC: 14.4 NG/ML (ref 30–100)
ALBUMIN SERPL BCP-MCNC: 3.5 G/DL (ref 3.5–5)
ALP SERPL-CCNC: 62 U/L (ref 46–116)
ALT SERPL W P-5'-P-CCNC: 50 U/L (ref 12–78)
ANION GAP SERPL CALCULATED.3IONS-SCNC: 5 MMOL/L (ref 4–13)
AST SERPL W P-5'-P-CCNC: 64 U/L (ref 5–45)
BACTERIA UR QL AUTO: ABNORMAL /HPF
BASOPHILS # BLD AUTO: 0.13 THOUSANDS/ΜL (ref 0–0.1)
BASOPHILS NFR BLD AUTO: 2 % (ref 0–1)
BILIRUB SERPL-MCNC: 0.6 MG/DL (ref 0.2–1)
BILIRUB UR QL STRIP: ABNORMAL
BUN SERPL-MCNC: 14 MG/DL (ref 5–25)
CALCIUM SERPL-MCNC: 9.7 MG/DL (ref 8.3–10.1)
CHLORIDE SERPL-SCNC: 103 MMOL/L (ref 100–108)
CHOLEST SERPL-MCNC: 208 MG/DL (ref 50–200)
CLARITY UR: ABNORMAL
CO2 SERPL-SCNC: 31 MMOL/L (ref 21–32)
COLOR UR: ABNORMAL
CREAT SERPL-MCNC: 1.13 MG/DL (ref 0.6–1.3)
EOSINOPHIL # BLD AUTO: 0.39 THOUSAND/ΜL (ref 0–0.61)
EOSINOPHIL NFR BLD AUTO: 5 % (ref 0–6)
ERYTHROCYTE [DISTWIDTH] IN BLOOD BY AUTOMATED COUNT: 14.6 % (ref 11.6–15.1)
GFR SERPL CREATININE-BSD FRML MDRD: 51 ML/MIN/1.73SQ M
GLUCOSE P FAST SERPL-MCNC: 107 MG/DL (ref 65–99)
GLUCOSE UR STRIP-MCNC: NEGATIVE MG/DL
HCT VFR BLD AUTO: 47.6 % (ref 34.8–46.1)
HDLC SERPL-MCNC: 36 MG/DL
HGB BLD-MCNC: 15.3 G/DL (ref 11.5–15.4)
HGB UR QL STRIP.AUTO: NEGATIVE
IMM GRANULOCYTES # BLD AUTO: 0.02 THOUSAND/UL (ref 0–0.2)
IMM GRANULOCYTES NFR BLD AUTO: 0 % (ref 0–2)
KETONES UR STRIP-MCNC: NEGATIVE MG/DL
LDLC SERPL CALC-MCNC: 110 MG/DL (ref 0–100)
LEUKOCYTE ESTERASE UR QL STRIP: NEGATIVE
LYMPHOCYTES # BLD AUTO: 1.95 THOUSANDS/ΜL (ref 0.6–4.47)
LYMPHOCYTES NFR BLD AUTO: 26 % (ref 14–44)
MCH RBC QN AUTO: 33.7 PG (ref 26.8–34.3)
MCHC RBC AUTO-ENTMCNC: 32.1 G/DL (ref 31.4–37.4)
MCV RBC AUTO: 105 FL (ref 82–98)
MONOCYTES # BLD AUTO: 0.74 THOUSAND/ΜL (ref 0.17–1.22)
MONOCYTES NFR BLD AUTO: 10 % (ref 4–12)
NEUTROPHILS # BLD AUTO: 4.43 THOUSANDS/ΜL (ref 1.85–7.62)
NEUTS SEG NFR BLD AUTO: 57 % (ref 43–75)
NITRITE UR QL STRIP: NEGATIVE
NON-SQ EPI CELLS URNS QL MICRO: ABNORMAL /HPF
NONHDLC SERPL-MCNC: 172 MG/DL
NRBC BLD AUTO-RTO: 0 /100 WBCS
PH UR STRIP.AUTO: 6 [PH]
PLATELET # BLD AUTO: 253 THOUSANDS/UL (ref 149–390)
PMV BLD AUTO: 11.5 FL (ref 8.9–12.7)
POTASSIUM SERPL-SCNC: 4.8 MMOL/L (ref 3.5–5.3)
PROT SERPL-MCNC: 7.8 G/DL (ref 6.4–8.2)
PROT UR STRIP-MCNC: NEGATIVE MG/DL
RBC # BLD AUTO: 4.54 MILLION/UL (ref 3.81–5.12)
RBC #/AREA URNS AUTO: ABNORMAL /HPF
SODIUM SERPL-SCNC: 139 MMOL/L (ref 136–145)
SP GR UR STRIP.AUTO: 1.02 (ref 1–1.03)
TRIGL SERPL-MCNC: 311 MG/DL
TSH SERPL DL<=0.05 MIU/L-ACNC: 1.25 UIU/ML (ref 0.36–3.74)
UROBILINOGEN UR QL STRIP.AUTO: 0.2 E.U./DL
WBC # BLD AUTO: 7.66 THOUSAND/UL (ref 4.31–10.16)
WBC #/AREA URNS AUTO: ABNORMAL /HPF

## 2020-02-21 PROCEDURE — 84443 ASSAY THYROID STIM HORMONE: CPT | Performed by: INTERNAL MEDICINE

## 2020-02-21 PROCEDURE — 81001 URINALYSIS AUTO W/SCOPE: CPT | Performed by: INTERNAL MEDICINE

## 2020-02-21 PROCEDURE — 85025 COMPLETE CBC W/AUTO DIFF WBC: CPT | Performed by: INTERNAL MEDICINE

## 2020-02-21 PROCEDURE — 36415 COLL VENOUS BLD VENIPUNCTURE: CPT | Performed by: INTERNAL MEDICINE

## 2020-02-21 PROCEDURE — 80061 LIPID PANEL: CPT | Performed by: INTERNAL MEDICINE

## 2020-02-21 PROCEDURE — 80053 COMPREHEN METABOLIC PANEL: CPT | Performed by: INTERNAL MEDICINE

## 2020-02-21 PROCEDURE — 82306 VITAMIN D 25 HYDROXY: CPT | Performed by: INTERNAL MEDICINE

## 2020-03-03 ENCOUNTER — TELEPHONE (OUTPATIENT)
Dept: FAMILY MEDICINE CLINIC | Facility: CLINIC | Age: 65
End: 2020-03-03

## 2020-03-03 NOTE — TELEPHONE ENCOUNTER
----- Message from Shelia Blevins MD sent at 3/2/2020 11:06 PM EST -----  Please follow-up for lab discussion

## 2020-03-25 ENCOUNTER — TELEMEDICINE (OUTPATIENT)
Dept: FAMILY MEDICINE CLINIC | Facility: CLINIC | Age: 65
End: 2020-03-25
Payer: MEDICARE

## 2020-03-25 DIAGNOSIS — Z12.11 COLON CANCER SCREENING: Primary | ICD-10-CM

## 2020-03-25 DIAGNOSIS — R73.9 HYPERGLYCEMIA: ICD-10-CM

## 2020-03-25 DIAGNOSIS — E78.5 HYPERLIPIDEMIA, UNSPECIFIED HYPERLIPIDEMIA TYPE: ICD-10-CM

## 2020-03-25 DIAGNOSIS — E55.9 VITAMIN D DEFICIENCY: ICD-10-CM

## 2020-03-25 DIAGNOSIS — I10 BENIGN ESSENTIAL HYPERTENSION: ICD-10-CM

## 2020-03-25 DIAGNOSIS — Z12.31 ENCOUNTER FOR SCREENING MAMMOGRAM FOR BREAST CANCER: ICD-10-CM

## 2020-03-25 DIAGNOSIS — Z72.0 TOBACCO ABUSE: ICD-10-CM

## 2020-03-25 PROCEDURE — 99213 OFFICE O/P EST LOW 20 MIN: CPT | Performed by: INTERNAL MEDICINE

## 2020-03-25 RX ORDER — ERGOCALCIFEROL 1.25 MG/1
50000 CAPSULE ORAL WEEKLY
Qty: 8 CAPSULE | Refills: 0 | Status: SHIPPED | OUTPATIENT
Start: 2020-03-25 | End: 2021-10-23 | Stop reason: HOSPADM

## 2020-03-25 RX ORDER — ROSUVASTATIN CALCIUM 10 MG/1
10 TABLET, COATED ORAL DAILY
Qty: 30 TABLET | Refills: 5 | Status: SHIPPED | OUTPATIENT
Start: 2020-03-25 | End: 2020-10-19

## 2020-03-25 NOTE — PROGRESS NOTES
Virtual Regular Visit      Benign hypertension  Stable continue current regimen compliance advised  Hyperlipidemia    Her 10 year risk of major atherosclerotic event is elevated at 23 %  Small dose of statin advised    Hyperglycemia  Diet modification     Tobacco use  Tobacco cessation advised    Vitamin-D deficiency  A prescription vitamin-D was sent to the pharmacy    Reason for visit is discussion of lab studies and hypertension    Encounter provider Fina Barrera MD    Provider located at [address] [facility] [address], [address] Anupam Desir , [address]  [facility] [address]      Recent Visits  No visits were found meeting these conditions  Showing recent visits within past 7 days and meeting all other requirements     Future Appointments  No visits were found meeting these conditions  Showing future appointments within next 150 days and meeting all other requirements        After connecting through Seen, the patient was identified by name and date of birth  Livia Kenny was informed that this is a telemedicine visit and that the visit is being conducted through [employer] which may not be secure and therefore, might not be HIPAA-compliant  My office door was closed  No one else was in the room  She acknowledged consent and understanding of privacy and security of the video platform  The patient has agreed to participate and understands they can discontinue the visit at any time  Subjective  Livia Kenny is a 59 y o  female         Past Medical History:   Diagnosis Date    Acid reflux     Anxiety     Arthritis     Back pain     Carpal tunnel syndrome, bilateral     Depression     Endometrial cancer ([facility] [address] ) 1997    Hiatal hernia     History of DVT of lower extremity     Right leg-had IVc filter  removal today-6/6/2016    History of pulmonary embolism     History of uterine cancer     Was stage 3- uterine, ovaries-hysterectomy    Hypercholesteremia     Hypertension     Left leg pain     Lumbar herniated disc     Tumor cells, benign     In right atrium  Probably congenital     Varicose vein of leg     Wears glasses        Past Surgical History:   Procedure Laterality Date    COLONOSCOPY      DENTAL SURGERY      DILATION AND CURETTAGE OF UTERUS      ESOPHAGOGASTRODUODENOSCOPY      FOOT SURGERY      LAST ASSESSED: [id number]    HYSTERECTOMY  1997    uterine cancer-hx    JOINT REPLACEMENT      b/l   THR    OOPHORECTOMY Bilateral 1997    IN COLONOSCOPY FLX DX W/COLLJ SPEC WHEN PFRMD N/A 6/14/2017    Procedure: EGD AND COLONOSCOPY;  Surgeon: Christy Toledo MD;  [facility]; Service: Gastroenterology    IN TOTAL HIP ARTHROPLASTY Left 4/13/2016    Procedure: ARTHROPLASTY HIP TOTAL;  Surgeon: Vandana Cardenas DO;  [facility];  Service: Orthopedics    TONSILLECTOMY AND ADENOIDECTOMY      TOTAL HIP ARTHROPLASTY Right     LAST ASSESSED: [id number]       Current Outpatient Medications   Medication Sig Dispense Refill    albuterol (PROVENTIL HFA,VENTOLIN HFA) 90 mcg/act inhaler Inhale 2 puffs every 6 (six) hours as needed for wheezing or shortness of breath 1 Inhaler 5    atenolol (TENORMIN) 25 mg tablet TAKE 2 TABLETS EVERY MORNING 180 tablet 4    benzonatate (TESSALON PERLES) 100 mg capsule Take 1 capsule (100 mg total) by mouth 3 (three) times a day as needed for cough 20 capsule 0    cyclobenzaprine (FLEXERIL) 5 mg tablet Take 1 tablet (5 mg total) by mouth 2 (two) times a day 10 tablet 0    cycloSPORINE (RESTASIS) 0 05 % ophthalmic emulsion 1 drop 2 (two) times a day        ergocalciferol (VITAMIN D2) 50,000 units Take 1 capsule (50,000 Units total) by mouth once a week 8 capsule 0    FLUoxetine (PROZAC) 10 mg capsule Take 3 capsules (30 mg total) by mouth daily 90 capsule 1    FLUoxetine (PROzac) 10 MG tablet TAKE 1 TABLET DAILY 90 tablet 0    FLUoxetine (PROzac) 20 mg capsule TAKE 1 CAPSULE DAILY 90 capsule 4    fluticasone-salmeterol (ADVAIR, WIXELA) 250-50 mcg/dose inhaler Inhale 1 puff 2 (two) times a day Rinse mouth after use  1 Inhaler 1    gabapentin (NEURONTIN) 300 mg capsule TAKE 1 CAPSULE THREE TIMES A  capsule 4    metroNIDAZOLE (METROGEL) 1 % gel Apply topically daily 45 g 0    nystatin (MYCOSTATIN) powder Apply topically 2 (two) times a day 15 g 0    olmesartan (BENICAR) 40 mg tablet TAKE 1 TABLET DAILY 90 tablet 4    pantoprazole (PROTONIX) 20 mg tablet TAKE 1 TABLET DAILY 90 tablet 4    rosuvastatin (CRESTOR) 10 MG tablet Take 1 tablet (10 mg total) by mouth daily 30 tablet 5    zolpidem (AMBIEN) 10 mg tablet Take 1 tablet (10 mg total) by mouth daily at bedtime 90 tablet 0     Current Facility-Administered Medications   Medication Dose Route Frequency Provider Last Rate Last Dose    lidocaine (XYLOCAINE) 1 % injection 1 mL  1 mL Injection  Becki Schwab DPM   1 mL at 09/25/19 1048    triamcinolone acetonide (KENALOG-40) 40 mg/mL injection 20 mg  20 mg Intra-articular  Becki Schwab, DPM   20 mg at 09/25/19 1048        Allergies   Allergen Reactions    Percocet [Oxycodone-Acetaminophen] Hives    Amlodipine     Percocet  [Oxycodone-Acetaminophen]     Patient with history of hypertension GERD and hyperlipidemia as opted to discuss her most recent lab studies over the phone because of covid endemic  Patient sugar was slightly elevated dietary modification was discussed  Patient has elevated LDL with increased triglycerides and low HDL  Her 10 year risk of major atherosclerotic event is elevated at 23 %  Diet modification discussed again  I was started on small dose of Crestor  Her liver enzymes slightly elevated likely secondary to fatty liver disease  Further workup in a few months  Unfortunate she continues to smoke 3-4 cigarettes a day  Her blood pressure few days ago was 113/84  Compliance with medication discussed  Vitamin-D was also low    Prescription strength vitamin-D supplementation prescribed  Review of Systems   Constitutional: Negative for chills and fever  Respiratory: Negative for cough and shortness of breath  Cardiovascular: Negative for chest pain, palpitations and leg swelling  Gastrointestinal: Negative for abdominal pain, constipation, diarrhea and nausea  Neurological: Negative for dizziness  Physical Exam   Constitutional: She is oriented to person, place, and time  HENT:   Mouth/Throat: Oropharynx is clear and moist    Pulmonary/Chest: Effort normal  No respiratory distress  Neurological: She is alert and oriented to person, place, and time  Psychiatric: She has a normal mood and affect  Her behavior is normal         I spent 20 minutes with the patient during this visit    [id number]

## 2020-04-08 DIAGNOSIS — F32.A DEPRESSIVE DISORDER: ICD-10-CM

## 2020-04-08 RX ORDER — FLUOXETINE 10 MG/1
TABLET, FILM COATED ORAL
Qty: 90 TABLET | Refills: 3 | Status: SHIPPED | OUTPATIENT
Start: 2020-04-08 | End: 2020-04-16 | Stop reason: SDUPTHER

## 2020-04-15 DIAGNOSIS — J20.9 ACUTE BRONCHITIS, UNSPECIFIED ORGANISM: ICD-10-CM

## 2020-04-15 DIAGNOSIS — M48.061 SPINAL STENOSIS OF LUMBAR REGION, UNSPECIFIED WHETHER NEUROGENIC CLAUDICATION PRESENT: ICD-10-CM

## 2020-04-15 DIAGNOSIS — F51.01 PRIMARY INSOMNIA: ICD-10-CM

## 2020-04-15 RX ORDER — BENZONATATE 100 MG/1
100 CAPSULE ORAL 3 TIMES DAILY PRN
Qty: 20 CAPSULE | Refills: 0 | Status: SHIPPED | OUTPATIENT
Start: 2020-04-15 | End: 2021-10-23 | Stop reason: HOSPADM

## 2020-04-15 RX ORDER — GABAPENTIN 300 MG/1
300 CAPSULE ORAL 3 TIMES DAILY
Qty: 270 CAPSULE | Refills: 4 | Status: SHIPPED | OUTPATIENT
Start: 2020-04-15 | End: 2021-10-11

## 2020-04-15 RX ORDER — ZOLPIDEM TARTRATE 10 MG/1
10 TABLET ORAL
Qty: 90 TABLET | Refills: 0 | Status: SHIPPED | OUTPATIENT
Start: 2020-04-15 | End: 2020-08-13 | Stop reason: SDUPTHER

## 2020-04-16 DIAGNOSIS — F32.A DEPRESSIVE DISORDER: ICD-10-CM

## 2020-04-16 DIAGNOSIS — M48.061 SPINAL STENOSIS OF LUMBAR REGION, UNSPECIFIED WHETHER NEUROGENIC CLAUDICATION PRESENT: ICD-10-CM

## 2020-04-17 RX ORDER — FLUOXETINE HYDROCHLORIDE 20 MG/1
20 CAPSULE ORAL DAILY
Qty: 90 CAPSULE | Refills: 3 | Status: SHIPPED | OUTPATIENT
Start: 2020-04-17 | End: 2021-04-01

## 2020-04-17 RX ORDER — FLUOXETINE 10 MG/1
10 TABLET, FILM COATED ORAL DAILY
Qty: 90 TABLET | Refills: 3 | Status: SHIPPED | OUTPATIENT
Start: 2020-04-17 | End: 2021-04-01

## 2020-07-10 ENCOUNTER — TELEPHONE (OUTPATIENT)
Dept: FAMILY MEDICINE CLINIC | Facility: CLINIC | Age: 65
End: 2020-07-10

## 2020-08-13 DIAGNOSIS — F51.01 PRIMARY INSOMNIA: ICD-10-CM

## 2020-08-13 RX ORDER — ZOLPIDEM TARTRATE 10 MG/1
10 TABLET ORAL
Qty: 90 TABLET | Refills: 0 | Status: SHIPPED | OUTPATIENT
Start: 2020-08-13 | End: 2020-08-14

## 2020-08-14 RX ORDER — ZOLPIDEM TARTRATE 5 MG/1
TABLET ORAL
Qty: 30 TABLET | Refills: 3 | Status: SHIPPED | OUTPATIENT
Start: 2020-08-14 | End: 2021-10-23 | Stop reason: HOSPADM

## 2020-08-31 DIAGNOSIS — I15.9 SECONDARY HYPERTENSION: ICD-10-CM

## 2020-08-31 DIAGNOSIS — F51.01 PRIMARY INSOMNIA: ICD-10-CM

## 2020-08-31 RX ORDER — OLMESARTAN MEDOXOMIL 40 MG/1
40 TABLET ORAL DAILY
Qty: 90 TABLET | Refills: 0 | Status: SHIPPED | OUTPATIENT
Start: 2020-08-31 | End: 2020-12-18

## 2020-08-31 RX ORDER — ZOLPIDEM TARTRATE 5 MG/1
TABLET ORAL
Qty: 30 TABLET | Refills: 0 | OUTPATIENT
Start: 2020-08-31

## 2020-08-31 RX ORDER — ATENOLOL 25 MG/1
50 TABLET ORAL EVERY MORNING
Qty: 180 TABLET | Refills: 0 | Status: SHIPPED | OUTPATIENT
Start: 2020-08-31 | End: 2020-12-18

## 2020-09-02 ENCOUNTER — TELEPHONE (OUTPATIENT)
Dept: FAMILY MEDICINE CLINIC | Facility: CLINIC | Age: 65
End: 2020-09-02

## 2020-09-04 DIAGNOSIS — F51.01 PRIMARY INSOMNIA: Primary | ICD-10-CM

## 2020-09-04 RX ORDER — TRAZODONE HYDROCHLORIDE 50 MG/1
TABLET ORAL
Qty: 30 TABLET | Refills: 0 | Status: SHIPPED | OUTPATIENT
Start: 2020-09-04 | End: 2020-10-05

## 2020-10-05 DIAGNOSIS — F51.01 PRIMARY INSOMNIA: ICD-10-CM

## 2020-10-05 RX ORDER — TRAZODONE HYDROCHLORIDE 50 MG/1
TABLET ORAL
Qty: 30 TABLET | Refills: 0 | Status: SHIPPED | OUTPATIENT
Start: 2020-10-05 | End: 2020-11-02

## 2020-10-17 DIAGNOSIS — E78.5 HYPERLIPIDEMIA, UNSPECIFIED HYPERLIPIDEMIA TYPE: ICD-10-CM

## 2020-10-19 RX ORDER — ROSUVASTATIN CALCIUM 10 MG/1
TABLET, COATED ORAL
Qty: 30 TABLET | Refills: 5 | Status: SHIPPED | OUTPATIENT
Start: 2020-10-19 | End: 2021-06-24

## 2020-11-01 DIAGNOSIS — F51.01 PRIMARY INSOMNIA: ICD-10-CM

## 2020-11-02 DIAGNOSIS — J20.9 ACUTE BRONCHITIS, UNSPECIFIED ORGANISM: ICD-10-CM

## 2020-11-02 RX ORDER — TRAZODONE HYDROCHLORIDE 50 MG/1
TABLET ORAL
Qty: 30 TABLET | Refills: 0 | Status: SHIPPED | OUTPATIENT
Start: 2020-11-02 | End: 2020-11-05

## 2020-11-04 DIAGNOSIS — F51.01 PRIMARY INSOMNIA: ICD-10-CM

## 2020-11-04 RX ORDER — TRAZODONE HYDROCHLORIDE 50 MG/1
TABLET ORAL
Qty: 30 TABLET | Refills: 0 | OUTPATIENT
Start: 2020-11-04

## 2020-11-05 DIAGNOSIS — F51.01 PRIMARY INSOMNIA: ICD-10-CM

## 2020-11-05 RX ORDER — TRAZODONE HYDROCHLORIDE 50 MG/1
TABLET ORAL
Qty: 30 TABLET | Refills: 0 | Status: SHIPPED | OUTPATIENT
Start: 2020-11-05 | End: 2020-12-07

## 2020-12-05 DIAGNOSIS — F51.01 PRIMARY INSOMNIA: ICD-10-CM

## 2020-12-05 DIAGNOSIS — R10.13 DYSPEPSIA: ICD-10-CM

## 2020-12-07 RX ORDER — PANTOPRAZOLE SODIUM 20 MG/1
20 TABLET, DELAYED RELEASE ORAL DAILY
Qty: 90 TABLET | Refills: 0 | Status: SHIPPED | OUTPATIENT
Start: 2020-12-07 | End: 2020-12-10 | Stop reason: SDUPTHER

## 2020-12-07 RX ORDER — TRAZODONE HYDROCHLORIDE 50 MG/1
TABLET ORAL
Qty: 30 TABLET | Refills: 0 | Status: SHIPPED | OUTPATIENT
Start: 2020-12-07 | End: 2020-12-10

## 2020-12-10 DIAGNOSIS — F51.01 PRIMARY INSOMNIA: ICD-10-CM

## 2020-12-10 DIAGNOSIS — R10.13 DYSPEPSIA: ICD-10-CM

## 2020-12-10 RX ORDER — TRAZODONE HYDROCHLORIDE 50 MG/1
TABLET ORAL
Qty: 90 TABLET | Refills: 1 | Status: SHIPPED | OUTPATIENT
Start: 2020-12-10 | End: 2021-10-23 | Stop reason: HOSPADM

## 2020-12-10 RX ORDER — PANTOPRAZOLE SODIUM 20 MG/1
20 TABLET, DELAYED RELEASE ORAL DAILY
Qty: 90 TABLET | Refills: 0 | Status: SHIPPED | OUTPATIENT
Start: 2020-12-10 | End: 2021-03-08

## 2020-12-16 DIAGNOSIS — J20.9 ACUTE BRONCHITIS, UNSPECIFIED ORGANISM: ICD-10-CM

## 2020-12-18 DIAGNOSIS — I15.9 SECONDARY HYPERTENSION: ICD-10-CM

## 2020-12-18 RX ORDER — OLMESARTAN MEDOXOMIL 40 MG/1
TABLET ORAL
Qty: 90 TABLET | Refills: 0 | Status: SHIPPED | OUTPATIENT
Start: 2020-12-18 | End: 2021-03-16

## 2020-12-18 RX ORDER — ATENOLOL 25 MG/1
50 TABLET ORAL EVERY MORNING
Qty: 180 TABLET | Refills: 0 | Status: SHIPPED | OUTPATIENT
Start: 2020-12-18 | End: 2021-03-16

## 2021-03-06 DIAGNOSIS — R10.13 DYSPEPSIA: ICD-10-CM

## 2021-03-08 RX ORDER — PANTOPRAZOLE SODIUM 20 MG/1
TABLET, DELAYED RELEASE ORAL
Qty: 90 TABLET | Refills: 0 | Status: SHIPPED | OUTPATIENT
Start: 2021-03-08 | End: 2021-06-23

## 2021-03-16 DIAGNOSIS — I15.9 SECONDARY HYPERTENSION: ICD-10-CM

## 2021-03-16 RX ORDER — OLMESARTAN MEDOXOMIL 40 MG/1
TABLET ORAL
Qty: 90 TABLET | Refills: 1 | Status: SHIPPED | OUTPATIENT
Start: 2021-03-16 | End: 2021-10-11

## 2021-03-16 RX ORDER — ATENOLOL 25 MG/1
50 TABLET ORAL EVERY MORNING
Qty: 180 TABLET | Refills: 0 | Status: SHIPPED | OUTPATIENT
Start: 2021-03-16 | End: 2021-06-23

## 2021-03-30 DIAGNOSIS — Z23 ENCOUNTER FOR IMMUNIZATION: ICD-10-CM

## 2021-04-01 ENCOUNTER — IMMUNIZATIONS (OUTPATIENT)
Dept: FAMILY MEDICINE CLINIC | Facility: HOSPITAL | Age: 66
End: 2021-04-01

## 2021-04-01 DIAGNOSIS — M48.061 SPINAL STENOSIS OF LUMBAR REGION, UNSPECIFIED WHETHER NEUROGENIC CLAUDICATION PRESENT: ICD-10-CM

## 2021-04-01 DIAGNOSIS — F32.A DEPRESSIVE DISORDER: ICD-10-CM

## 2021-04-01 DIAGNOSIS — Z23 ENCOUNTER FOR IMMUNIZATION: Primary | ICD-10-CM

## 2021-04-01 PROCEDURE — 0001A SARS-COV-2 / COVID-19 MRNA VACCINE (PFIZER-BIONTECH) 30 MCG: CPT

## 2021-04-01 PROCEDURE — 91300 SARS-COV-2 / COVID-19 MRNA VACCINE (PFIZER-BIONTECH) 30 MCG: CPT

## 2021-04-01 RX ORDER — FLUOXETINE 10 MG/1
TABLET, FILM COATED ORAL
Qty: 90 TABLET | Refills: 3 | Status: SHIPPED | OUTPATIENT
Start: 2021-04-01 | End: 2021-10-23 | Stop reason: HOSPADM

## 2021-04-01 RX ORDER — FLUOXETINE HYDROCHLORIDE 20 MG/1
CAPSULE ORAL
Qty: 90 CAPSULE | Refills: 3 | Status: SHIPPED | OUTPATIENT
Start: 2021-04-01 | End: 2021-10-23 | Stop reason: HOSPADM

## 2021-04-12 DIAGNOSIS — E78.5 HYPERLIPIDEMIA, UNSPECIFIED HYPERLIPIDEMIA TYPE: ICD-10-CM

## 2021-04-12 RX ORDER — ROSUVASTATIN CALCIUM 10 MG/1
TABLET, COATED ORAL
Qty: 90 TABLET | Refills: 1 | OUTPATIENT
Start: 2021-04-12

## 2021-04-22 ENCOUNTER — IMMUNIZATIONS (OUTPATIENT)
Dept: FAMILY MEDICINE CLINIC | Facility: HOSPITAL | Age: 66
End: 2021-04-22

## 2021-04-22 DIAGNOSIS — Z23 ENCOUNTER FOR IMMUNIZATION: Primary | ICD-10-CM

## 2021-04-22 PROCEDURE — 0002A SARS-COV-2 / COVID-19 MRNA VACCINE (PFIZER-BIONTECH) 30 MCG: CPT

## 2021-04-22 PROCEDURE — 91300 SARS-COV-2 / COVID-19 MRNA VACCINE (PFIZER-BIONTECH) 30 MCG: CPT

## 2021-05-27 DIAGNOSIS — J20.9 ACUTE BRONCHITIS, UNSPECIFIED ORGANISM: ICD-10-CM

## 2021-05-29 DIAGNOSIS — E78.5 HYPERLIPIDEMIA, UNSPECIFIED HYPERLIPIDEMIA TYPE: ICD-10-CM

## 2021-06-01 RX ORDER — ROSUVASTATIN CALCIUM 10 MG/1
TABLET, COATED ORAL
Qty: 90 TABLET | Refills: 1 | OUTPATIENT
Start: 2021-06-01

## 2021-06-08 RX ORDER — FOLIC ACID 1 MG/1
1 TABLET ORAL
COMMUNITY
End: 2021-10-23 | Stop reason: HOSPADM

## 2021-06-08 RX ORDER — NAPROXEN 500 MG/1
TABLET ORAL
COMMUNITY
End: 2021-06-10

## 2021-06-08 RX ORDER — CYCLOSPORINE 0.5 MG/ML
EMULSION OPHTHALMIC
COMMUNITY
End: 2021-10-23 | Stop reason: HOSPADM

## 2021-06-08 RX ORDER — WARFARIN SODIUM 2.5 MG/1
2.5 TABLET ORAL
COMMUNITY
End: 2021-06-10

## 2021-06-08 RX ORDER — ASCORBIC ACID 500 MG
500 TABLET ORAL
COMMUNITY
End: 2021-10-23 | Stop reason: HOSPADM

## 2021-06-08 RX ORDER — BISACODYL 10 MG
10 SUPPOSITORY, RECTAL RECTAL
COMMUNITY
End: 2021-10-23 | Stop reason: HOSPADM

## 2021-06-08 RX ORDER — GABAPENTIN 300 MG/1
300 CAPSULE ORAL 3 TIMES DAILY
COMMUNITY

## 2021-06-08 RX ORDER — SENNA PLUS 8.6 MG/1
17.2 TABLET ORAL
COMMUNITY
End: 2021-10-23 | Stop reason: HOSPADM

## 2021-06-08 RX ORDER — FERROUS SULFATE 325(65) MG
325 TABLET ORAL
COMMUNITY
End: 2021-10-23 | Stop reason: HOSPADM

## 2021-06-10 ENCOUNTER — OFFICE VISIT (OUTPATIENT)
Dept: FAMILY MEDICINE CLINIC | Facility: CLINIC | Age: 66
End: 2021-06-10
Payer: COMMERCIAL

## 2021-06-10 VITALS
SYSTOLIC BLOOD PRESSURE: 124 MMHG | WEIGHT: 212 LBS | BODY MASS INDEX: 37.56 KG/M2 | HEIGHT: 63 IN | HEART RATE: 78 BPM | DIASTOLIC BLOOD PRESSURE: 84 MMHG | TEMPERATURE: 99.9 F | OXYGEN SATURATION: 92 %

## 2021-06-10 DIAGNOSIS — I10 BENIGN ESSENTIAL HYPERTENSION: ICD-10-CM

## 2021-06-10 DIAGNOSIS — E78.5 HYPERLIPIDEMIA, UNSPECIFIED HYPERLIPIDEMIA TYPE: ICD-10-CM

## 2021-06-10 DIAGNOSIS — Z72.0 TOBACCO USE: ICD-10-CM

## 2021-06-10 DIAGNOSIS — Z13.89 SCREENING FOR GENITOURINARY CONDITION: ICD-10-CM

## 2021-06-10 DIAGNOSIS — M48.061 SPINAL STENOSIS OF LUMBAR REGION, UNSPECIFIED WHETHER NEUROGENIC CLAUDICATION PRESENT: ICD-10-CM

## 2021-06-10 DIAGNOSIS — Z12.31 VISIT FOR SCREENING MAMMOGRAM: ICD-10-CM

## 2021-06-10 DIAGNOSIS — R73.9 HYPERGLYCEMIA: ICD-10-CM

## 2021-06-10 DIAGNOSIS — Z13.9 DUE FOR SCREENING: Primary | ICD-10-CM

## 2021-06-10 DIAGNOSIS — D68.59 HYPERCOAGULABLE STATE (HCC): ICD-10-CM

## 2021-06-10 DIAGNOSIS — Z13.29 SCREENING FOR THYROID DISORDER: ICD-10-CM

## 2021-06-10 DIAGNOSIS — E55.9 VITAMIN D DEFICIENCY: ICD-10-CM

## 2021-06-10 PROCEDURE — 4004F PT TOBACCO SCREEN RCVD TLK: CPT | Performed by: INTERNAL MEDICINE

## 2021-06-10 PROCEDURE — 1160F RVW MEDS BY RX/DR IN RCRD: CPT | Performed by: INTERNAL MEDICINE

## 2021-06-10 PROCEDURE — 3725F SCREEN DEPRESSION PERFORMED: CPT | Performed by: INTERNAL MEDICINE

## 2021-06-10 PROCEDURE — 99214 OFFICE O/P EST MOD 30 MIN: CPT | Performed by: INTERNAL MEDICINE

## 2021-06-10 PROCEDURE — 3008F BODY MASS INDEX DOCD: CPT | Performed by: INTERNAL MEDICINE

## 2021-06-10 NOTE — PROGRESS NOTES
Answers for HPI/ROS submitted by the patient on 6/9/2021   How would you rate your overall health?: very good  Compared to last year, how is your physical health?: slightly better  In general, how satisfied are you with your life?: satisfied  Compared to last year, how is your eyesight?: same  Compared to last year, how is your hearing?: same  Compared to last year, how is your emotional/mental health?: same  How often is anger a problem for you?: sometimes  How often do you feel unusually tired/fatigued?: sometimes  In the past 7 days, how much pain have you experienced?: some  If you answered "some" or "a lot", please rate the severity of your pain on a scale of 1 to 10 (1 being the least severe pain and 10 being the most intense pain)  : 4/10  In the past 6 months, have you lost or gained 10 pounds without trying?: Yes  One or more falls in the last year: No  In the past 6 months, have you accidentally leaked urine?: Yes  Do you have trouble with the stairs inside or outside your home?: Yes  Does your home have working smoke alarms?: Yes  Does your home have a carbon monoxide monitor?: Yes  Which safety hazards (if any) have you experienced in your home? Please select all that apply : none  How would you describe your current diet?  Please select all that apply : Limited junk food  In addition to prescription medications, are you taking any over-the-counter supplements?: No  Can you manage your medications?: Yes  Are you currently taking any opioid medications?: No  Can you walk and transfer into and out of your bed and chair?: Yes  Can you dress and groom yourself?: Yes  Can you bathe or shower yourself?: Yes  Can you feed yourself?: Yes  Can you do your laundry/ housekeeping?: Yes  Can you manage your money, pay your bills, and track your expenses?: Yes  Can you make your own meals?: Yes  Can you do your own shopping?: Yes  Within the last 12 months, have you had any hospitalizations or Emergency Department visits?: No  Do you have a living will?: Yes  Do you have a Durable POA (Power of ) for healthcare decisions?: Yes  Do you have an Advanced Directive for end of life decisions?: Yes  How often have you used an illegal drug (including marijuana) or a prescription medication for non-medical reasons in the past year?: never  What is the typical number of drinks you consume in a day?: 0  What is the typical number of drinks you consume in a week?: 2  How often did you have a drink containing alcohol in the past year?: 2 to 4 times a month  How many drinks did you have on a typical day  when you were drinking in the past year?: 3 to 4  How often did you have 6 or more drinks on one occasion in the past year?: never  How often during the last year have you found that you were not able to stop drinking once you had started?: 0 - never  How often during the last year have you failed to do what was normally expected from you because of drinking?: 0 - never  How often during the last year have you needed a first drink in the morning to get yourself going after a heavy drinking session?: 0 - never  How often during the last year have you had a feeling of guilt or remorse after drinking?: 0 - never  How often during the last year have you been unable to remember what happened the night before because you had been drinking?: 0 - never  Have you or someone else been injured as a result of your drinking?: 0 - no  Has a relative or friend or a doctor or another health worker been concerned about your drinking or suggested you cut down?: 0 - no

## 2021-06-12 DIAGNOSIS — F51.01 PRIMARY INSOMNIA: ICD-10-CM

## 2021-06-15 RX ORDER — ZOLPIDEM TARTRATE 10 MG/1
TABLET ORAL
Qty: 90 TABLET | Refills: 1 | Status: SHIPPED | OUTPATIENT
Start: 2021-06-15 | End: 2021-10-23 | Stop reason: HOSPADM

## 2021-06-23 DIAGNOSIS — E78.5 HYPERLIPIDEMIA, UNSPECIFIED HYPERLIPIDEMIA TYPE: ICD-10-CM

## 2021-06-24 RX ORDER — ROSUVASTATIN CALCIUM 10 MG/1
TABLET, COATED ORAL
Qty: 90 TABLET | Refills: 1 | Status: SHIPPED | OUTPATIENT
Start: 2021-06-24 | End: 2021-10-23 | Stop reason: HOSPADM

## 2021-06-25 DIAGNOSIS — M62.830 MUSCLE SPASM OF BACK: ICD-10-CM

## 2021-06-25 RX ORDER — CYCLOBENZAPRINE HCL 5 MG
5 TABLET ORAL 2 TIMES DAILY
Qty: 10 TABLET | Refills: 0 | Status: SHIPPED | OUTPATIENT
Start: 2021-06-25 | End: 2021-10-23 | Stop reason: HOSPADM

## 2021-07-19 ENCOUNTER — TELEPHONE (OUTPATIENT)
Dept: FAMILY MEDICINE CLINIC | Facility: CLINIC | Age: 66
End: 2021-07-19

## 2021-07-19 NOTE — TELEPHONE ENCOUNTER
Called pt lm to call office back  Per Dr Marin pt [place] came back positive and Dr Marin would like patient to have GI consult  Please advise  Unsure if pt has GI doctor if not referral needs to be placed

## 2021-07-21 ENCOUNTER — TELEPHONE (OUTPATIENT)
Dept: FAMILY MEDICINE CLINIC | Facility: CLINIC | Age: 66
End: 2021-07-21

## 2021-07-21 NOTE — TELEPHONE ENCOUNTER
Called pt lm to call office back per Dr Marin Hemoccult is positive pt should follow up with Dr Alonzo  Please advise thank you

## 2021-07-21 NOTE — TELEPHONE ENCOUNTER
Patient called and was informed about her results, was advised to follow up with Dr Bernadette Carter

## 2021-07-22 ENCOUNTER — TELEPHONE (OUTPATIENT)
Dept: FAMILY MEDICINE CLINIC | Facility: CLINIC | Age: 66
End: 2021-07-22

## 2021-07-22 NOTE — TELEPHONE ENCOUNTER
Called pt lm for pt to call office back   Per Ethel Corral Could you please make sure patient has contacted GI for her positive Cologuard

## 2021-09-22 DIAGNOSIS — J20.9 ACUTE BRONCHITIS, UNSPECIFIED ORGANISM: ICD-10-CM

## 2021-10-11 DIAGNOSIS — M48.061 SPINAL STENOSIS OF LUMBAR REGION, UNSPECIFIED WHETHER NEUROGENIC CLAUDICATION PRESENT: ICD-10-CM

## 2021-10-11 DIAGNOSIS — I15.9 SECONDARY HYPERTENSION: ICD-10-CM

## 2021-10-11 RX ORDER — GABAPENTIN 300 MG/1
CAPSULE ORAL
Qty: 270 CAPSULE | Refills: 4 | Status: SHIPPED | OUTPATIENT
Start: 2021-10-11 | End: 2021-10-23 | Stop reason: HOSPADM

## 2021-10-11 RX ORDER — OLMESARTAN MEDOXOMIL 40 MG/1
TABLET ORAL
Qty: 90 TABLET | Refills: 1 | Status: SHIPPED | OUTPATIENT
Start: 2021-10-11 | End: 2021-10-23 | Stop reason: HOSPADM

## 2021-10-20 ENCOUNTER — ANESTHESIA (EMERGENCY)
Dept: RADIOLOGY | Facility: HOSPITAL | Age: 66
DRG: 062 | End: 2021-10-20
Payer: COMMERCIAL

## 2021-10-20 ENCOUNTER — ANESTHESIA EVENT (EMERGENCY)
Dept: RADIOLOGY | Facility: HOSPITAL | Age: 66
DRG: 062 | End: 2021-10-20
Payer: COMMERCIAL

## 2021-10-20 ENCOUNTER — APPOINTMENT (EMERGENCY)
Dept: RADIOLOGY | Facility: HOSPITAL | Age: 66
DRG: 062 | End: 2021-10-20
Payer: COMMERCIAL

## 2021-10-20 ENCOUNTER — HOSPITAL ENCOUNTER (INPATIENT)
Facility: HOSPITAL | Age: 66
LOS: 3 days | Discharge: HOME | DRG: 062 | End: 2021-10-23
Attending: EMERGENCY MEDICINE | Admitting: ANESTHESIOLOGY
Payer: COMMERCIAL

## 2021-10-20 DIAGNOSIS — R29.90 STROKE-LIKE SYMPTOMS: ICD-10-CM

## 2021-10-20 DIAGNOSIS — D68.52 PROTHROMBIN MUTATION (HCC): ICD-10-CM

## 2021-10-20 DIAGNOSIS — Z86.711 HX OF PULMONARY EMBOLUS: ICD-10-CM

## 2021-10-20 DIAGNOSIS — I10 BENIGN ESSENTIAL HYPERTENSION: ICD-10-CM

## 2021-10-20 DIAGNOSIS — D68.59 HYPERCOAGULABLE STATE (HCC): ICD-10-CM

## 2021-10-20 DIAGNOSIS — I63.412 CEREBROVASCULAR ACCIDENT (CVA) DUE TO EMBOLISM OF LEFT MIDDLE CEREBRAL ARTERY (HCC): Primary | ICD-10-CM

## 2021-10-20 DIAGNOSIS — F17.200 SMOKING: ICD-10-CM

## 2021-10-20 DIAGNOSIS — J40 BRONCHITIS: ICD-10-CM

## 2021-10-20 PROBLEM — I63.9 STROKE (HCC): Status: ACTIVE | Noted: 2021-10-20

## 2021-10-20 LAB
ANION GAP SERPL CALCULATED.3IONS-SCNC: 1 MMOL/L (ref 4–13)
APTT PPP: 25 SECONDS (ref 23–37)
BUN SERPL-MCNC: 12 MG/DL (ref 5–25)
CALCIUM SERPL-MCNC: 8.8 MG/DL (ref 8.3–10.1)
CHLORIDE SERPL-SCNC: 104 MMOL/L (ref 100–108)
CO2 SERPL-SCNC: 30 MMOL/L (ref 21–32)
CREAT SERPL-MCNC: 1.21 MG/DL (ref 0.6–1.3)
ERYTHROCYTE [DISTWIDTH] IN BLOOD BY AUTOMATED COUNT: 15.8 % (ref 11.6–15.1)
GFR SERPL CREATININE-BSD FRML MDRD: 47 ML/MIN/1.73SQ M
GLUCOSE SERPL-MCNC: 87 MG/DL (ref 65–140)
HCT VFR BLD AUTO: 40.6 % (ref 34.8–46.1)
HGB BLD-MCNC: 13 G/DL (ref 11.5–15.4)
INR PPP: 1.01 (ref 0.84–1.19)
MCH RBC QN AUTO: 34 PG (ref 26.8–34.3)
MCHC RBC AUTO-ENTMCNC: 32 G/DL (ref 31.4–37.4)
MCV RBC AUTO: 106 FL (ref 82–98)
PLATELET # BLD AUTO: 208 THOUSANDS/UL (ref 149–390)
PMV BLD AUTO: 10.8 FL (ref 8.9–12.7)
POTASSIUM SERPL-SCNC: 4.8 MMOL/L (ref 3.5–5.3)
PROTHROMBIN TIME: 12.9 SECONDS (ref 11.6–14.5)
RBC # BLD AUTO: 3.82 MILLION/UL (ref 3.81–5.12)
SODIUM SERPL-SCNC: 135 MMOL/L (ref 136–145)
TROPONIN I SERPL-MCNC: <0.02 NG/ML
WBC # BLD AUTO: 6.07 THOUSAND/UL (ref 4.31–10.16)

## 2021-10-20 PROCEDURE — C1769 GUIDE WIRE: HCPCS

## 2021-10-20 PROCEDURE — 76937 US GUIDE VASCULAR ACCESS: CPT | Performed by: RADIOLOGY

## 2021-10-20 PROCEDURE — C1757 CATH, THROMBECTOMY/EMBOLECT: HCPCS

## 2021-10-20 PROCEDURE — C1887 CATHETER, GUIDING: HCPCS

## 2021-10-20 PROCEDURE — 76937 US GUIDE VASCULAR ACCESS: CPT

## 2021-10-20 PROCEDURE — 36223 PLACE CATH CAROTID/INOM ART: CPT

## 2021-10-20 PROCEDURE — 80048 BASIC METABOLIC PNL TOTAL CA: CPT | Performed by: EMERGENCY MEDICINE

## 2021-10-20 PROCEDURE — 36415 COLL VENOUS BLD VENIPUNCTURE: CPT

## 2021-10-20 PROCEDURE — C1894 INTRO/SHEATH, NON-LASER: HCPCS

## 2021-10-20 PROCEDURE — 93005 ELECTROCARDIOGRAM TRACING: CPT

## 2021-10-20 PROCEDURE — 85027 COMPLETE CBC AUTOMATED: CPT | Performed by: EMERGENCY MEDICINE

## 2021-10-20 PROCEDURE — 3E03317 INTRODUCTION OF OTHER THROMBOLYTIC INTO PERIPHERAL VEIN, PERCUTANEOUS APPROACH: ICD-10-PCS | Performed by: INTERNAL MEDICINE

## 2021-10-20 PROCEDURE — 36224 PLACE CATH CAROTD ART: CPT | Performed by: RADIOLOGY

## 2021-10-20 PROCEDURE — 99291 CRITICAL CARE FIRST HOUR: CPT

## 2021-10-20 PROCEDURE — 85730 THROMBOPLASTIN TIME PARTIAL: CPT | Performed by: EMERGENCY MEDICINE

## 2021-10-20 PROCEDURE — 99291 CRITICAL CARE FIRST HOUR: CPT | Performed by: EMERGENCY MEDICINE

## 2021-10-20 PROCEDURE — C1760 CLOSURE DEV, VASC: HCPCS

## 2021-10-20 PROCEDURE — 82948 REAGENT STRIP/BLOOD GLUCOSE: CPT

## 2021-10-20 PROCEDURE — 84484 ASSAY OF TROPONIN QUANT: CPT | Performed by: EMERGENCY MEDICINE

## 2021-10-20 PROCEDURE — 96360 HYDRATION IV INFUSION INIT: CPT

## 2021-10-20 PROCEDURE — 85610 PROTHROMBIN TIME: CPT | Performed by: EMERGENCY MEDICINE

## 2021-10-20 PROCEDURE — 99223 1ST HOSP IP/OBS HIGH 75: CPT | Performed by: RADIOLOGY

## 2021-10-20 PROCEDURE — 99291 CRITICAL CARE FIRST HOUR: CPT | Performed by: PHYSICIAN ASSISTANT

## 2021-10-20 RX ORDER — NICOTINE 21 MG/24HR
1 PATCH, TRANSDERMAL 24 HOURS TRANSDERMAL DAILY
Status: DISCONTINUED | OUTPATIENT
Start: 2021-10-21 | End: 2021-10-23 | Stop reason: HOSPADM

## 2021-10-20 RX ORDER — LIDOCAINE HYDROCHLORIDE 10 MG/ML
INJECTION, SOLUTION EPIDURAL; INFILTRATION; INTRACAUDAL; PERINEURAL
Status: COMPLETED | OUTPATIENT
Start: 2021-10-20 | End: 2021-10-20

## 2021-10-20 RX ORDER — SODIUM CHLORIDE 9 MG/ML
INJECTION, SOLUTION INTRAVENOUS CONTINUOUS PRN
Status: DISCONTINUED | OUTPATIENT
Start: 2021-10-20 | End: 2021-10-20

## 2021-10-20 RX ORDER — FENTANYL CITRATE 50 UG/ML
INJECTION, SOLUTION INTRAMUSCULAR; INTRAVENOUS AS NEEDED
Status: DISCONTINUED | OUTPATIENT
Start: 2021-10-20 | End: 2021-10-20

## 2021-10-20 RX ORDER — ATORVASTATIN CALCIUM 40 MG/1
40 TABLET, FILM COATED ORAL EVERY EVENING
Status: DISCONTINUED | OUTPATIENT
Start: 2021-10-20 | End: 2021-10-23 | Stop reason: HOSPADM

## 2021-10-20 RX ADMIN — LIDOCAINE HYDROCHLORIDE 0.5 ML: 10 INJECTION, SOLUTION EPIDURAL; INFILTRATION; INTRACAUDAL; PERINEURAL at 19:52

## 2021-10-20 RX ADMIN — SODIUM CHLORIDE: 0.9 INJECTION, SOLUTION INTRAVENOUS at 19:37

## 2021-10-20 RX ADMIN — SODIUM CHLORIDE 50 ML: 9 INJECTION, SOLUTION INTRAVENOUS at 21:32

## 2021-10-20 RX ADMIN — IOHEXOL 85 ML: 350 INJECTION, SOLUTION INTRAVENOUS at 18:41

## 2021-10-20 RX ADMIN — ALTEPLASE 78.5 MG: KIT at 19:32

## 2021-10-20 RX ADMIN — SODIUM CHLORIDE 1000 ML: 0.9 INJECTION, SOLUTION INTRAVENOUS at 19:33

## 2021-10-20 RX ADMIN — FENTANYL CITRATE 25 MCG: 50 INJECTION INTRAMUSCULAR; INTRAVENOUS at 19:52

## 2021-10-20 RX ADMIN — IODIXANOL 30 ML: 320 INJECTION, SOLUTION INTRAVASCULAR at 20:22

## 2021-10-20 RX ADMIN — ATORVASTATIN CALCIUM 40 MG: 40 TABLET, FILM COATED ORAL at 23:33

## 2021-10-21 ENCOUNTER — APPOINTMENT (INPATIENT)
Dept: RADIOLOGY | Facility: HOSPITAL | Age: 66
DRG: 062 | End: 2021-10-21
Payer: COMMERCIAL

## 2021-10-21 PROBLEM — Z86.711 HX OF PULMONARY EMBOLUS: Chronic | Status: ACTIVE | Noted: 2021-10-21

## 2021-10-21 LAB
ALBUMIN SERPL BCP-MCNC: 2.5 G/DL (ref 3.5–5)
ALP SERPL-CCNC: 44 U/L (ref 46–116)
ALT SERPL W P-5'-P-CCNC: 16 U/L (ref 12–78)
ANION GAP SERPL CALCULATED.3IONS-SCNC: 2 MMOL/L (ref 4–13)
AST SERPL W P-5'-P-CCNC: 18 U/L (ref 5–45)
ATRIAL RATE: 85 BPM
BASOPHILS # BLD AUTO: 0.07 THOUSANDS/ΜL (ref 0–0.1)
BASOPHILS NFR BLD AUTO: 1 % (ref 0–1)
BILIRUB SERPL-MCNC: 0.29 MG/DL (ref 0.2–1)
BUN SERPL-MCNC: 10 MG/DL (ref 5–25)
CA-I BLD-SCNC: 1.09 MMOL/L (ref 1.12–1.32)
CALCIUM ALBUM COR SERPL-MCNC: 9.5 MG/DL (ref 8.3–10.1)
CALCIUM SERPL-MCNC: 8.3 MG/DL (ref 8.3–10.1)
CHLORIDE SERPL-SCNC: 108 MMOL/L (ref 100–108)
CHOLEST SERPL-MCNC: 114 MG/DL (ref 50–200)
CO2 SERPL-SCNC: 29 MMOL/L (ref 21–32)
CREAT SERPL-MCNC: 0.95 MG/DL (ref 0.6–1.3)
EOSINOPHIL # BLD AUTO: 0.21 THOUSAND/ΜL (ref 0–0.61)
EOSINOPHIL NFR BLD AUTO: 3 % (ref 0–6)
ERYTHROCYTE [DISTWIDTH] IN BLOOD BY AUTOMATED COUNT: 15.8 % (ref 11.6–15.1)
EST. AVERAGE GLUCOSE BLD GHB EST-MCNC: 117 MG/DL
GFR SERPL CREATININE-BSD FRML MDRD: 63 ML/MIN/1.73SQ M
GLUCOSE SERPL-MCNC: 100 MG/DL (ref 65–140)
GLUCOSE SERPL-MCNC: 103 MG/DL (ref 65–140)
GLUCOSE SERPL-MCNC: 103 MG/DL (ref 65–140)
HBA1C MFR BLD: 5.7 %
HCT VFR BLD AUTO: 38.6 % (ref 34.8–46.1)
HDLC SERPL-MCNC: 37 MG/DL
HGB BLD-MCNC: 12.4 G/DL (ref 11.5–15.4)
IMM GRANULOCYTES # BLD AUTO: 0.03 THOUSAND/UL (ref 0–0.2)
IMM GRANULOCYTES NFR BLD AUTO: 1 % (ref 0–2)
LDLC SERPL CALC-MCNC: 26 MG/DL (ref 0–100)
LYMPHOCYTES # BLD AUTO: 1.8 THOUSANDS/ΜL (ref 0.6–4.47)
LYMPHOCYTES NFR BLD AUTO: 29 % (ref 14–44)
MAGNESIUM SERPL-MCNC: 1.5 MG/DL (ref 1.6–2.6)
MCH RBC QN AUTO: 34 PG (ref 26.8–34.3)
MCHC RBC AUTO-ENTMCNC: 32.1 G/DL (ref 31.4–37.4)
MCV RBC AUTO: 106 FL (ref 82–98)
MONOCYTES # BLD AUTO: 0.63 THOUSAND/ΜL (ref 0.17–1.22)
MONOCYTES NFR BLD AUTO: 10 % (ref 4–12)
NEUTROPHILS # BLD AUTO: 3.39 THOUSANDS/ΜL (ref 1.85–7.62)
NEUTS SEG NFR BLD AUTO: 56 % (ref 43–75)
NRBC BLD AUTO-RTO: 0 /100 WBCS
P AXIS: -69 DEGREES
PHOSPHATE SERPL-MCNC: 3.6 MG/DL (ref 2.3–4.1)
PLATELET # BLD AUTO: 185 THOUSANDS/UL (ref 149–390)
PMV BLD AUTO: 11.2 FL (ref 8.9–12.7)
POTASSIUM SERPL-SCNC: 4.1 MMOL/L (ref 3.5–5.3)
PR INTERVAL: 132 MS
PROT SERPL-MCNC: 5.8 G/DL (ref 6.4–8.2)
QRS AXIS: -13 DEGREES
QRSD INTERVAL: 86 MS
QT INTERVAL: 338 MS
QTC INTERVAL: 402 MS
RBC # BLD AUTO: 3.65 MILLION/UL (ref 3.81–5.12)
SODIUM SERPL-SCNC: 139 MMOL/L (ref 136–145)
T WAVE AXIS: -3 DEGREES
TRIGL SERPL-MCNC: 254 MG/DL
TSH SERPL DL<=0.05 MIU/L-ACNC: 0.62 UIU/ML (ref 0.36–3.74)
VENTRICULAR RATE: 85 BPM
WBC # BLD AUTO: 6.13 THOUSAND/UL (ref 4.31–10.16)

## 2021-10-21 PROCEDURE — 99232 SBSQ HOSP IP/OBS MODERATE 35: CPT | Performed by: RADIOLOGY

## 2021-10-21 PROCEDURE — 93010 ELECTROCARDIOGRAM REPORT: CPT | Performed by: INTERNAL MEDICINE

## 2021-10-21 PROCEDURE — 80061 LIPID PANEL: CPT | Performed by: PSYCHIATRY & NEUROLOGY

## 2021-10-21 PROCEDURE — 90662 IIV NO PRSV INCREASED AG IM: CPT | Performed by: ANESTHESIOLOGY

## 2021-10-21 PROCEDURE — 85025 COMPLETE CBC W/AUTO DIFF WBC: CPT | Performed by: PHYSICIAN ASSISTANT

## 2021-10-21 PROCEDURE — 82948 REAGENT STRIP/BLOOD GLUCOSE: CPT

## 2021-10-21 PROCEDURE — G0008 ADMIN INFLUENZA VIRUS VAC: HCPCS | Performed by: ANESTHESIOLOGY

## 2021-10-21 PROCEDURE — 70450 CT HEAD/BRAIN W/O DYE: CPT

## 2021-10-21 PROCEDURE — 92610 EVALUATE SWALLOWING FUNCTION: CPT

## 2021-10-21 PROCEDURE — NC001 PR NO CHARGE: Performed by: PSYCHIATRY & NEUROLOGY

## 2021-10-21 PROCEDURE — 84100 ASSAY OF PHOSPHORUS: CPT | Performed by: PHYSICIAN ASSISTANT

## 2021-10-21 PROCEDURE — 80053 COMPREHEN METABOLIC PANEL: CPT | Performed by: PHYSICIAN ASSISTANT

## 2021-10-21 PROCEDURE — 99233 SBSQ HOSP IP/OBS HIGH 50: CPT | Performed by: ANESTHESIOLOGY

## 2021-10-21 PROCEDURE — 82330 ASSAY OF CALCIUM: CPT | Performed by: PHYSICIAN ASSISTANT

## 2021-10-21 PROCEDURE — 99223 1ST HOSP IP/OBS HIGH 75: CPT | Performed by: PSYCHIATRY & NEUROLOGY

## 2021-10-21 PROCEDURE — 83735 ASSAY OF MAGNESIUM: CPT | Performed by: PHYSICIAN ASSISTANT

## 2021-10-21 PROCEDURE — 83036 HEMOGLOBIN GLYCOSYLATED A1C: CPT | Performed by: PSYCHIATRY & NEUROLOGY

## 2021-10-21 PROCEDURE — 94760 N-INVAS EAR/PLS OXIMETRY 1: CPT

## 2021-10-21 PROCEDURE — 84443 ASSAY THYROID STIM HORMONE: CPT | Performed by: PSYCHIATRY & NEUROLOGY

## 2021-10-21 PROCEDURE — NC001 PR NO CHARGE: Performed by: PHYSICIAN ASSISTANT

## 2021-10-21 PROCEDURE — B246ZZ4 ULTRASONOGRAPHY OF RIGHT AND LEFT HEART, TRANSESOPHAGEAL: ICD-10-PCS | Performed by: INTERNAL MEDICINE

## 2021-10-21 RX ORDER — PANTOPRAZOLE SODIUM 20 MG/1
20 TABLET, DELAYED RELEASE ORAL
Status: DISCONTINUED | OUTPATIENT
Start: 2021-10-21 | End: 2021-10-23 | Stop reason: HOSPADM

## 2021-10-21 RX ORDER — ATENOLOL 50 MG/1
50 TABLET ORAL EVERY MORNING
Status: DISCONTINUED | OUTPATIENT
Start: 2021-10-21 | End: 2021-10-23 | Stop reason: HOSPADM

## 2021-10-21 RX ORDER — CALCIUM GLUCONATE 20 MG/ML
1 INJECTION, SOLUTION INTRAVENOUS ONCE
Status: COMPLETED | OUTPATIENT
Start: 2021-10-21 | End: 2021-10-22

## 2021-10-21 RX ORDER — ASPIRIN 81 MG/1
81 TABLET ORAL ONCE
Status: COMPLETED | OUTPATIENT
Start: 2021-10-21 | End: 2021-10-21

## 2021-10-21 RX ORDER — ALBUTEROL SULFATE 2.5 MG/3ML
2.5 SOLUTION RESPIRATORY (INHALATION) EVERY 4 HOURS PRN
Status: DISCONTINUED | OUTPATIENT
Start: 2021-10-21 | End: 2021-10-23 | Stop reason: HOSPADM

## 2021-10-21 RX ORDER — LEVALBUTEROL 1.25 MG/.5ML
1.25 SOLUTION, CONCENTRATE RESPIRATORY (INHALATION) EVERY 6 HOURS PRN
Status: DISCONTINUED | OUTPATIENT
Start: 2021-10-21 | End: 2021-10-21

## 2021-10-21 RX ORDER — GABAPENTIN 300 MG/1
300 CAPSULE ORAL 3 TIMES DAILY
Status: DISCONTINUED | OUTPATIENT
Start: 2021-10-21 | End: 2021-10-23 | Stop reason: HOSPADM

## 2021-10-21 RX ORDER — ASPIRIN 81 MG/1
81 TABLET ORAL DAILY
Status: DISCONTINUED | OUTPATIENT
Start: 2021-10-23 | End: 2021-10-23 | Stop reason: HOSPADM

## 2021-10-21 RX ORDER — MAGNESIUM SULFATE HEPTAHYDRATE 40 MG/ML
2 INJECTION, SOLUTION INTRAVENOUS ONCE
Status: COMPLETED | OUTPATIENT
Start: 2021-10-21 | End: 2021-10-21

## 2021-10-21 RX ADMIN — ATENOLOL 50 MG: 50 TABLET ORAL at 13:13

## 2021-10-21 RX ADMIN — ATORVASTATIN CALCIUM 40 MG: 40 TABLET, FILM COATED ORAL at 17:00

## 2021-10-21 RX ADMIN — GABAPENTIN 300 MG: 300 CAPSULE ORAL at 13:13

## 2021-10-21 RX ADMIN — LEVALBUTEROL HYDROCHLORIDE 1.25 MG: 1.25 SOLUTION, CONCENTRATE RESPIRATORY (INHALATION) at 16:41

## 2021-10-21 RX ADMIN — FLUOXETINE HYDROCHLORIDE 30 MG: 10 CAPSULE ORAL at 13:40

## 2021-10-21 RX ADMIN — INFLUENZA A VIRUS A/VICTORIA/2570/2019 IVR-215 (H1N1) ANTIGEN (FORMALDEHYDE INACTIVATED), INFLUENZA A VIRUS A/TASMANIA/503/2020 IVR-221 (H3N2) ANTIGEN (FORMALDEHYDE INACTIVATED), INFLUENZA B VIRUS B/PHUKET/3073/2013 ANTIGEN (FORMALDEHYDE INACTIVATED), AND INFLUENZA B VIRUS B/WASHINGTON/02/2019 ANTIGEN (FORMALDEHYDE INACTIVATED) 0.7 ML: 60; 60; 60; 60 INJECTION, SUSPENSION INTRAMUSCULAR at 20:15

## 2021-10-21 RX ADMIN — GABAPENTIN 300 MG: 300 CAPSULE ORAL at 20:29

## 2021-10-21 RX ADMIN — GABAPENTIN 300 MG: 300 CAPSULE ORAL at 16:59

## 2021-10-21 RX ADMIN — SODIUM CHLORIDE 1000 ML: 0.9 INJECTION, SOLUTION INTRAVENOUS at 03:00

## 2021-10-21 RX ADMIN — ASPIRIN 81 MG: 81 TABLET, COATED ORAL at 22:03

## 2021-10-21 RX ADMIN — CALCIUM GLUCONATE 1 G: 20 INJECTION, SOLUTION INTRAVENOUS at 14:27

## 2021-10-21 RX ADMIN — MAGNESIUM SULFATE HEPTAHYDRATE 2 G: 40 INJECTION, SOLUTION INTRAVENOUS at 13:14

## 2021-10-21 RX ADMIN — PANTOPRAZOLE SODIUM 20 MG: 20 TABLET, DELAYED RELEASE ORAL at 13:13

## 2021-10-22 ENCOUNTER — ANESTHESIA (INPATIENT)
Dept: NON INVASIVE DIAGNOSTICS | Facility: HOSPITAL | Age: 66
DRG: 062 | End: 2021-10-22
Payer: COMMERCIAL

## 2021-10-22 ENCOUNTER — APPOINTMENT (INPATIENT)
Dept: RADIOLOGY | Facility: HOSPITAL | Age: 66
DRG: 062 | End: 2021-10-22
Payer: COMMERCIAL

## 2021-10-22 LAB
ANION GAP SERPL CALCULATED.3IONS-SCNC: 2 MMOL/L (ref 4–13)
BUN SERPL-MCNC: 9 MG/DL (ref 5–25)
CALCIUM SERPL-MCNC: 9.5 MG/DL (ref 8.3–10.1)
CHLORIDE SERPL-SCNC: 103 MMOL/L (ref 100–108)
CO2 SERPL-SCNC: 31 MMOL/L (ref 21–32)
CREAT SERPL-MCNC: 1.08 MG/DL (ref 0.6–1.3)
ERYTHROCYTE [DISTWIDTH] IN BLOOD BY AUTOMATED COUNT: 15.6 % (ref 11.6–15.1)
GFR SERPL CREATININE-BSD FRML MDRD: 54 ML/MIN/1.73SQ M
GLUCOSE SERPL-MCNC: 93 MG/DL (ref 65–140)
HCT VFR BLD AUTO: 40.6 % (ref 34.8–46.1)
HGB BLD-MCNC: 12.9 G/DL (ref 11.5–15.4)
MCH RBC QN AUTO: 33.9 PG (ref 26.8–34.3)
MCHC RBC AUTO-ENTMCNC: 31.8 G/DL (ref 31.4–37.4)
MCV RBC AUTO: 107 FL (ref 82–98)
PLATELET # BLD AUTO: 185 THOUSANDS/UL (ref 149–390)
PMV BLD AUTO: 11.2 FL (ref 8.9–12.7)
POTASSIUM SERPL-SCNC: 4.3 MMOL/L (ref 3.5–5.3)
RBC # BLD AUTO: 3.81 MILLION/UL (ref 3.81–5.12)
SL CV LV EF: 60
SODIUM SERPL-SCNC: 136 MMOL/L (ref 136–145)
WBC # BLD AUTO: 6.26 THOUSAND/UL (ref 4.31–10.16)

## 2021-10-22 PROCEDURE — 93312 ECHO TRANSESOPHAGEAL: CPT | Performed by: INTERNAL MEDICINE

## 2021-10-22 PROCEDURE — 85027 COMPLETE CBC AUTOMATED: CPT | Performed by: PSYCHIATRY & NEUROLOGY

## 2021-10-22 PROCEDURE — 93320 DOPPLER ECHO COMPLETE: CPT | Performed by: INTERNAL MEDICINE

## 2021-10-22 PROCEDURE — 97162 PT EVAL MOD COMPLEX 30 MIN: CPT

## 2021-10-22 PROCEDURE — 93312 ECHO TRANSESOPHAGEAL: CPT

## 2021-10-22 PROCEDURE — 70551 MRI BRAIN STEM W/O DYE: CPT

## 2021-10-22 PROCEDURE — 99232 SBSQ HOSP IP/OBS MODERATE 35: CPT | Performed by: PSYCHIATRY & NEUROLOGY

## 2021-10-22 PROCEDURE — 94760 N-INVAS EAR/PLS OXIMETRY 1: CPT

## 2021-10-22 PROCEDURE — 99233 SBSQ HOSP IP/OBS HIGH 50: CPT | Performed by: INTERNAL MEDICINE

## 2021-10-22 PROCEDURE — 97166 OT EVAL MOD COMPLEX 45 MIN: CPT

## 2021-10-22 PROCEDURE — 80048 BASIC METABOLIC PNL TOTAL CA: CPT | Performed by: PSYCHIATRY & NEUROLOGY

## 2021-10-22 PROCEDURE — 93325 DOPPLER ECHO COLOR FLOW MAPG: CPT | Performed by: INTERNAL MEDICINE

## 2021-10-22 RX ORDER — PROPOFOL 10 MG/ML
INJECTION, EMULSION INTRAVENOUS AS NEEDED
Status: DISCONTINUED | OUTPATIENT
Start: 2021-10-22 | End: 2021-10-22

## 2021-10-22 RX ORDER — SODIUM CHLORIDE 9 MG/ML
INJECTION, SOLUTION INTRAVENOUS CONTINUOUS PRN
Status: DISCONTINUED | OUTPATIENT
Start: 2021-10-22 | End: 2021-10-22

## 2021-10-22 RX ORDER — KETAMINE HYDROCHLORIDE 50 MG/ML
INJECTION, SOLUTION, CONCENTRATE INTRAMUSCULAR; INTRAVENOUS AS NEEDED
Status: DISCONTINUED | OUTPATIENT
Start: 2021-10-22 | End: 2021-10-22

## 2021-10-22 RX ORDER — FENTANYL CITRATE 50 UG/ML
INJECTION, SOLUTION INTRAMUSCULAR; INTRAVENOUS AS NEEDED
Status: DISCONTINUED | OUTPATIENT
Start: 2021-10-22 | End: 2021-10-22

## 2021-10-22 RX ORDER — GLYCOPYRROLATE 0.2 MG/ML
INJECTION INTRAMUSCULAR; INTRAVENOUS AS NEEDED
Status: DISCONTINUED | OUTPATIENT
Start: 2021-10-22 | End: 2021-10-22

## 2021-10-22 RX ORDER — HEPARIN SODIUM 5000 [USP'U]/ML
5000 INJECTION, SOLUTION INTRAVENOUS; SUBCUTANEOUS EVERY 8 HOURS SCHEDULED
Status: DISCONTINUED | OUTPATIENT
Start: 2021-10-22 | End: 2021-10-22

## 2021-10-22 RX ADMIN — GABAPENTIN 300 MG: 300 CAPSULE ORAL at 15:05

## 2021-10-22 RX ADMIN — PROPOFOL 50 MG: 10 INJECTION, EMULSION INTRAVENOUS at 13:51

## 2021-10-22 RX ADMIN — GLYCOPYRROLATE 0.2 MG: 0.2 INJECTION, SOLUTION INTRAMUSCULAR; INTRAVENOUS at 13:47

## 2021-10-22 RX ADMIN — SODIUM CHLORIDE: 0.9 INJECTION, SOLUTION INTRAVENOUS at 13:34

## 2021-10-22 RX ADMIN — KETAMINE HYDROCHLORIDE 20 MG: 50 INJECTION, SOLUTION INTRAMUSCULAR; INTRAVENOUS at 13:46

## 2021-10-22 RX ADMIN — PROPOFOL 50 MG: 10 INJECTION, EMULSION INTRAVENOUS at 13:55

## 2021-10-22 RX ADMIN — GABAPENTIN 300 MG: 300 CAPSULE ORAL at 21:38

## 2021-10-22 RX ADMIN — PROPOFOL 50 MG: 10 INJECTION, EMULSION INTRAVENOUS at 13:43

## 2021-10-22 RX ADMIN — ATORVASTATIN CALCIUM 40 MG: 40 TABLET, FILM COATED ORAL at 17:33

## 2021-10-22 RX ADMIN — FENTANYL CITRATE 25 MCG: 50 INJECTION INTRAMUSCULAR; INTRAVENOUS at 13:47

## 2021-10-22 RX ADMIN — PROPOFOL 50 MG: 10 INJECTION, EMULSION INTRAVENOUS at 13:46

## 2021-10-22 RX ADMIN — PROPOFOL 100 MG: 10 INJECTION, EMULSION INTRAVENOUS at 13:40

## 2021-10-22 RX ADMIN — KETAMINE HYDROCHLORIDE 10 MG: 50 INJECTION, SOLUTION INTRAMUSCULAR; INTRAVENOUS at 13:52

## 2021-10-22 RX ADMIN — ATENOLOL 50 MG: 50 TABLET ORAL at 15:05

## 2021-10-22 RX ADMIN — APIXABAN 10 MG: 5 TABLET, FILM COATED ORAL at 17:33

## 2021-10-22 RX ADMIN — FLUOXETINE HYDROCHLORIDE 30 MG: 10 CAPSULE ORAL at 15:05

## 2021-10-22 RX ADMIN — PANTOPRAZOLE SODIUM 20 MG: 20 TABLET, DELAYED RELEASE ORAL at 06:42

## 2021-10-23 LAB
BASOPHILS # BLD AUTO: 0.08 THOUSANDS/ΜL (ref 0–0.1)
BASOPHILS NFR BLD AUTO: 1 % (ref 0–1)
EOSINOPHIL # BLD AUTO: 0.24 THOUSAND/ΜL (ref 0–0.61)
EOSINOPHIL NFR BLD AUTO: 4 % (ref 0–6)
ERYTHROCYTE [DISTWIDTH] IN BLOOD BY AUTOMATED COUNT: 15.1 % (ref 11.6–15.1)
FOLATE SERPL-MCNC: 4.7 NG/ML (ref 3.1–17.5)
HCT VFR BLD AUTO: 39 % (ref 34.8–46.1)
HGB BLD-MCNC: 12.5 G/DL (ref 11.5–15.4)
IMM GRANULOCYTES # BLD AUTO: 0.02 THOUSAND/UL (ref 0–0.2)
IMM GRANULOCYTES NFR BLD AUTO: 0 % (ref 0–2)
LYMPHOCYTES # BLD AUTO: 1.62 THOUSANDS/ΜL (ref 0.6–4.47)
LYMPHOCYTES NFR BLD AUTO: 28 % (ref 14–44)
MAGNESIUM SERPL-MCNC: 1.7 MG/DL (ref 1.6–2.6)
MCH RBC QN AUTO: 34.1 PG (ref 26.8–34.3)
MCHC RBC AUTO-ENTMCNC: 32.1 G/DL (ref 31.4–37.4)
MCV RBC AUTO: 106 FL (ref 82–98)
MONOCYTES # BLD AUTO: 0.67 THOUSAND/ΜL (ref 0.17–1.22)
MONOCYTES NFR BLD AUTO: 12 % (ref 4–12)
NEUTROPHILS # BLD AUTO: 3.15 THOUSANDS/ΜL (ref 1.85–7.62)
NEUTS SEG NFR BLD AUTO: 55 % (ref 43–75)
NRBC BLD AUTO-RTO: 0 /100 WBCS
PLATELET # BLD AUTO: 177 THOUSANDS/UL (ref 149–390)
PMV BLD AUTO: 11.2 FL (ref 8.9–12.7)
RBC # BLD AUTO: 3.67 MILLION/UL (ref 3.81–5.12)
VIT B12 SERPL-MCNC: 317 PG/ML (ref 100–900)
WBC # BLD AUTO: 5.78 THOUSAND/UL (ref 4.31–10.16)

## 2021-10-23 PROCEDURE — 83735 ASSAY OF MAGNESIUM: CPT | Performed by: STUDENT IN AN ORGANIZED HEALTH CARE EDUCATION/TRAINING PROGRAM

## 2021-10-23 PROCEDURE — 99239 HOSP IP/OBS DSCHRG MGMT >30: CPT | Performed by: INTERNAL MEDICINE

## 2021-10-23 PROCEDURE — 82607 VITAMIN B-12: CPT | Performed by: STUDENT IN AN ORGANIZED HEALTH CARE EDUCATION/TRAINING PROGRAM

## 2021-10-23 PROCEDURE — 85025 COMPLETE CBC W/AUTO DIFF WBC: CPT | Performed by: STUDENT IN AN ORGANIZED HEALTH CARE EDUCATION/TRAINING PROGRAM

## 2021-10-23 PROCEDURE — 82746 ASSAY OF FOLIC ACID SERUM: CPT | Performed by: STUDENT IN AN ORGANIZED HEALTH CARE EDUCATION/TRAINING PROGRAM

## 2021-10-23 RX ORDER — ATENOLOL 50 MG/1
50 TABLET ORAL EVERY MORNING
Qty: 60 TABLET | Refills: 0 | Status: SHIPPED | OUTPATIENT
Start: 2021-10-24 | End: 2021-10-23 | Stop reason: HOSPADM

## 2021-10-23 RX ORDER — ALBUTEROL SULFATE 2.5 MG/3ML
2.5 SOLUTION RESPIRATORY (INHALATION) EVERY 4 HOURS PRN
Qty: 3 ML | Refills: 0 | Status: SHIPPED | OUTPATIENT
Start: 2021-10-23 | End: 2022-04-27

## 2021-10-23 RX ORDER — FLUOXETINE 10 MG/1
30 CAPSULE ORAL DAILY
Qty: 120 CAPSULE | Refills: 0 | Status: SHIPPED | OUTPATIENT
Start: 2021-10-24 | End: 2022-02-17 | Stop reason: SDUPTHER

## 2021-10-23 RX ORDER — NICOTINE 21 MG/24HR
1 PATCH, TRANSDERMAL 24 HOURS TRANSDERMAL DAILY
Qty: 28 PATCH | Refills: 0 | Status: SHIPPED | OUTPATIENT
Start: 2021-10-24 | End: 2022-01-31 | Stop reason: HOSPADM

## 2021-10-23 RX ORDER — ATORVASTATIN CALCIUM 40 MG/1
40 TABLET, FILM COATED ORAL EVERY EVENING
Qty: 60 TABLET | Refills: 0 | Status: SHIPPED | OUTPATIENT
Start: 2021-10-23 | End: 2022-02-02 | Stop reason: SDUPTHER

## 2021-10-23 RX ORDER — ASPIRIN 81 MG/1
81 TABLET ORAL DAILY
Qty: 60 TABLET | Refills: 0 | Status: SHIPPED | OUTPATIENT
Start: 2021-10-24 | End: 2022-06-10 | Stop reason: ALTCHOICE

## 2021-10-23 RX ADMIN — FLUOXETINE HYDROCHLORIDE 30 MG: 10 CAPSULE ORAL at 09:24

## 2021-10-23 RX ADMIN — ASPIRIN 81 MG: 81 TABLET, COATED ORAL at 09:25

## 2021-10-23 RX ADMIN — APIXABAN 10 MG: 5 TABLET, FILM COATED ORAL at 09:24

## 2021-10-23 RX ADMIN — SODIUM CHLORIDE 500 ML: 0.9 INJECTION, SOLUTION INTRAVENOUS at 10:53

## 2021-10-23 RX ADMIN — PANTOPRAZOLE SODIUM 20 MG: 20 TABLET, DELAYED RELEASE ORAL at 06:08

## 2021-10-23 RX ADMIN — GABAPENTIN 300 MG: 300 CAPSULE ORAL at 09:24

## 2021-10-25 ENCOUNTER — TRANSITIONAL CARE MANAGEMENT (OUTPATIENT)
Dept: FAMILY MEDICINE CLINIC | Facility: CLINIC | Age: 66
End: 2021-10-25

## 2021-10-27 ENCOUNTER — TRANSITIONAL CARE MANAGEMENT (OUTPATIENT)
Dept: FAMILY MEDICINE CLINIC | Facility: CLINIC | Age: 66
End: 2021-10-27

## 2021-10-27 ENCOUNTER — TELEPHONE (OUTPATIENT)
Dept: FAMILY MEDICINE CLINIC | Facility: CLINIC | Age: 66
End: 2021-10-27

## 2021-10-27 VITALS
HEART RATE: 84 BPM | HEIGHT: 63 IN | RESPIRATION RATE: 14 BRPM | OXYGEN SATURATION: 94 % | BODY MASS INDEX: 37.11 KG/M2 | TEMPERATURE: 99 F | DIASTOLIC BLOOD PRESSURE: 65 MMHG | WEIGHT: 209.44 LBS | SYSTOLIC BLOOD PRESSURE: 100 MMHG

## 2021-10-28 ENCOUNTER — RA CDI HCC (OUTPATIENT)
Dept: OTHER | Facility: HOSPITAL | Age: 66
End: 2021-10-28

## 2021-10-29 ENCOUNTER — TELEPHONE (OUTPATIENT)
Dept: NEUROLOGY | Facility: CLINIC | Age: 66
End: 2021-10-29

## 2021-11-10 DIAGNOSIS — R10.13 DYSPEPSIA: ICD-10-CM

## 2021-11-10 RX ORDER — PANTOPRAZOLE SODIUM 20 MG/1
TABLET, DELAYED RELEASE ORAL
Qty: 90 TABLET | Refills: 1 | Status: SHIPPED | OUTPATIENT
Start: 2021-11-10 | End: 2022-06-24

## 2021-11-12 ENCOUNTER — TELEPHONE (OUTPATIENT)
Dept: NEUROLOGY | Facility: CLINIC | Age: 66
End: 2021-11-12

## 2021-11-17 ENCOUNTER — TELEPHONE (OUTPATIENT)
Dept: NEUROLOGY | Facility: CLINIC | Age: 66
End: 2021-11-17

## 2021-11-26 ENCOUNTER — OFFICE VISIT (OUTPATIENT)
Dept: NEUROLOGY | Facility: CLINIC | Age: 66
End: 2021-11-26
Payer: COMMERCIAL

## 2021-11-26 VITALS
BODY MASS INDEX: 37.21 KG/M2 | TEMPERATURE: 97.2 F | HEART RATE: 96 BPM | HEIGHT: 63 IN | WEIGHT: 210 LBS | SYSTOLIC BLOOD PRESSURE: 135 MMHG | DIASTOLIC BLOOD PRESSURE: 85 MMHG

## 2021-11-26 DIAGNOSIS — E78.5 DYSLIPIDEMIA: ICD-10-CM

## 2021-11-26 DIAGNOSIS — F17.200 SMOKING: ICD-10-CM

## 2021-11-26 DIAGNOSIS — Q21.1 PFO (PATENT FORAMEN OVALE): ICD-10-CM

## 2021-11-26 DIAGNOSIS — D68.59 HYPERCOAGULABLE STATE (HCC): ICD-10-CM

## 2021-11-26 DIAGNOSIS — I10 BENIGN ESSENTIAL HYPERTENSION: ICD-10-CM

## 2021-11-26 DIAGNOSIS — I63.412 CEREBROVASCULAR ACCIDENT (CVA) DUE TO EMBOLISM OF LEFT MIDDLE CEREBRAL ARTERY (HCC): Primary | ICD-10-CM

## 2021-11-26 PROCEDURE — 3008F BODY MASS INDEX DOCD: CPT | Performed by: INTERNAL MEDICINE

## 2021-11-26 PROCEDURE — 99215 OFFICE O/P EST HI 40 MIN: CPT | Performed by: PHYSICIAN ASSISTANT

## 2021-11-29 ENCOUNTER — TELEPHONE (OUTPATIENT)
Dept: GASTROENTEROLOGY | Facility: MEDICAL CENTER | Age: 66
End: 2021-11-29

## 2021-11-29 ENCOUNTER — OFFICE VISIT (OUTPATIENT)
Dept: GASTROENTEROLOGY | Facility: MEDICAL CENTER | Age: 66
End: 2021-11-29
Payer: COMMERCIAL

## 2021-11-29 VITALS
HEART RATE: 83 BPM | DIASTOLIC BLOOD PRESSURE: 93 MMHG | BODY MASS INDEX: 36.74 KG/M2 | SYSTOLIC BLOOD PRESSURE: 133 MMHG | TEMPERATURE: 98.9 F | WEIGHT: 207.4 LBS

## 2021-11-29 DIAGNOSIS — E66.01 OBESITY, MORBID (HCC): ICD-10-CM

## 2021-11-29 DIAGNOSIS — Z86.010 HISTORY OF COLON POLYPS: Primary | ICD-10-CM

## 2021-11-29 DIAGNOSIS — R19.5 POSITIVE COLORECTAL CANCER SCREENING USING COLOGUARD TEST: ICD-10-CM

## 2021-11-29 DIAGNOSIS — D68.59 HYPERCOAGULABLE STATE (HCC): ICD-10-CM

## 2021-11-29 DIAGNOSIS — Z91.89 AT RISK FOR STROKE: ICD-10-CM

## 2021-11-29 DIAGNOSIS — I63.412 CEREBROVASCULAR ACCIDENT (CVA) DUE TO EMBOLISM OF LEFT MIDDLE CEREBRAL ARTERY (HCC): ICD-10-CM

## 2021-11-29 PROCEDURE — 99204 OFFICE O/P NEW MOD 45 MIN: CPT | Performed by: INTERNAL MEDICINE

## 2021-11-29 PROCEDURE — 1160F RVW MEDS BY RX/DR IN RCRD: CPT | Performed by: INTERNAL MEDICINE

## 2021-11-29 PROCEDURE — 1036F TOBACCO NON-USER: CPT | Performed by: INTERNAL MEDICINE

## 2021-12-02 ENCOUNTER — TELEPHONE (OUTPATIENT)
Dept: FAMILY MEDICINE CLINIC | Facility: CLINIC | Age: 66
End: 2021-12-02

## 2021-12-02 ENCOUNTER — TELEPHONE (OUTPATIENT)
Dept: OTHER | Facility: OTHER | Age: 66
End: 2021-12-02

## 2021-12-03 ENCOUNTER — TELEPHONE (OUTPATIENT)
Dept: HEMATOLOGY ONCOLOGY | Facility: CLINIC | Age: 66
End: 2021-12-03

## 2021-12-06 DIAGNOSIS — D68.52 PROTHROMBIN MUTATION (HCC): ICD-10-CM

## 2021-12-06 DIAGNOSIS — D68.59 HYPERCOAGULABLE STATE (HCC): ICD-10-CM

## 2021-12-06 DIAGNOSIS — I63.412 CEREBROVASCULAR ACCIDENT (CVA) DUE TO EMBOLISM OF LEFT MIDDLE CEREBRAL ARTERY (HCC): ICD-10-CM

## 2021-12-06 NOTE — TELEPHONE ENCOUNTER
Patient left vm to refill eliquis 5 mg bid; send to [facility] [address]    patient of [place], [place], last office visit 11/26  note documents:   "Continue with combination of Eliquis 5mg twice a day"   for secondary stroke prevention    Dr Jt Calloway (resident ordered previous)  Please sign script if in agreement, thank you  Quality 431: Preventive Care And Screening: Unhealthy Alcohol Use - Screening: Patient screened for unhealthy alcohol use using a single question and scores less than 2 times per year Quality 226: Preventive Care And Screening: Tobacco Use: Screening And Cessation Intervention: Patient screened for tobacco use and is an ex/non-smoker Detail Level: Detailed

## 2021-12-08 ENCOUNTER — TELEPHONE (OUTPATIENT)
Dept: SLEEP CENTER | Facility: CLINIC | Age: 66
End: 2021-12-08

## 2021-12-09 ENCOUNTER — TELEPHONE (OUTPATIENT)
Dept: GASTROENTEROLOGY | Facility: CLINIC | Age: 66
End: 2021-12-09

## 2021-12-24 ENCOUNTER — HOSPITAL ENCOUNTER (EMERGENCY)
Facility: HOSPITAL | Age: 66
Discharge: HOME | End: 2021-12-24
Attending: EMERGENCY MEDICINE
Payer: COMMERCIAL

## 2021-12-24 ENCOUNTER — APPOINTMENT (EMERGENCY)
Dept: RADIOLOGY | Facility: HOSPITAL | Age: 66
End: 2021-12-24
Payer: COMMERCIAL

## 2021-12-24 VITALS
WEIGHT: 200 LBS | TEMPERATURE: 98.9 F | HEIGHT: 63 IN | SYSTOLIC BLOOD PRESSURE: 159 MMHG | RESPIRATION RATE: 20 BRPM | DIASTOLIC BLOOD PRESSURE: 97 MMHG | OXYGEN SATURATION: 94 % | HEART RATE: 98 BPM | BODY MASS INDEX: 35.44 KG/M2

## 2021-12-24 DIAGNOSIS — G89.29 ACUTE EXACERBATION OF CHRONIC LOW BACK PAIN: Primary | ICD-10-CM

## 2021-12-24 DIAGNOSIS — M54.50 ACUTE EXACERBATION OF CHRONIC LOW BACK PAIN: Primary | ICD-10-CM

## 2021-12-24 DIAGNOSIS — M48.00 MULTILEVEL SPINAL STENOSIS: ICD-10-CM

## 2021-12-24 LAB
ALBUMIN SERPL BCP-MCNC: 3.5 G/DL (ref 3.5–5)
ALP SERPL-CCNC: 58 U/L (ref 46–116)
ALT SERPL W P-5'-P-CCNC: 15 U/L (ref 12–78)
ANION GAP SERPL CALCULATED.3IONS-SCNC: 9 MMOL/L (ref 4–13)
AST SERPL W P-5'-P-CCNC: 18 U/L (ref 5–45)
BASOPHILS # BLD AUTO: 0.07 THOUSANDS/ΜL (ref 0–0.1)
BASOPHILS NFR BLD AUTO: 1 % (ref 0–1)
BILIRUB SERPL-MCNC: 0.63 MG/DL (ref 0.2–1)
BILIRUB UR QL STRIP: NEGATIVE
BUN SERPL-MCNC: 17 MG/DL (ref 5–25)
CALCIUM SERPL-MCNC: 8.9 MG/DL (ref 8.3–10.1)
CHLORIDE SERPL-SCNC: 100 MMOL/L (ref 100–108)
CLARITY UR: CLEAR
CO2 SERPL-SCNC: 24 MMOL/L (ref 21–32)
COLOR UR: ABNORMAL
CREAT SERPL-MCNC: 1.38 MG/DL (ref 0.6–1.3)
EOSINOPHIL # BLD AUTO: 0.07 THOUSAND/ΜL (ref 0–0.61)
EOSINOPHIL NFR BLD AUTO: 1 % (ref 0–6)
ERYTHROCYTE [DISTWIDTH] IN BLOOD BY AUTOMATED COUNT: 13.9 % (ref 11.6–15.1)
GFR SERPL CREATININE-BSD FRML MDRD: 39 ML/MIN/1.73SQ M
GLUCOSE SERPL-MCNC: 109 MG/DL (ref 65–140)
GLUCOSE UR STRIP-MCNC: NEGATIVE MG/DL
HCT VFR BLD AUTO: 40.4 % (ref 34.8–46.1)
HGB BLD-MCNC: 13.5 G/DL (ref 11.5–15.4)
HGB UR QL STRIP.AUTO: NEGATIVE
IMM GRANULOCYTES # BLD AUTO: 0.04 THOUSAND/UL (ref 0–0.2)
IMM GRANULOCYTES NFR BLD AUTO: 1 % (ref 0–2)
KETONES UR STRIP-MCNC: ABNORMAL MG/DL
LEUKOCYTE ESTERASE UR QL STRIP: NEGATIVE
LYMPHOCYTES # BLD AUTO: 1.33 THOUSANDS/ΜL (ref 0.6–4.47)
LYMPHOCYTES NFR BLD AUTO: 15 % (ref 14–44)
MCH RBC QN AUTO: 32.9 PG (ref 26.8–34.3)
MCHC RBC AUTO-ENTMCNC: 33.4 G/DL (ref 31.4–37.4)
MCV RBC AUTO: 99 FL (ref 82–98)
MONOCYTES # BLD AUTO: 0.73 THOUSAND/ΜL (ref 0.17–1.22)
MONOCYTES NFR BLD AUTO: 8 % (ref 4–12)
NEUTROPHILS # BLD AUTO: 6.61 THOUSANDS/ΜL (ref 1.85–7.62)
NEUTS SEG NFR BLD AUTO: 74 % (ref 43–75)
NITRITE UR QL STRIP: NEGATIVE
NRBC BLD AUTO-RTO: 0 /100 WBCS
PH UR STRIP.AUTO: 5 [PH]
PLATELET # BLD AUTO: 216 THOUSANDS/UL (ref 149–390)
PMV BLD AUTO: 10.6 FL (ref 8.9–12.7)
POTASSIUM SERPL-SCNC: 4.4 MMOL/L (ref 3.5–5.3)
PROT SERPL-MCNC: 7.3 G/DL (ref 6.4–8.2)
PROT UR STRIP-MCNC: NEGATIVE MG/DL
RBC # BLD AUTO: 4.1 MILLION/UL (ref 3.81–5.12)
SODIUM SERPL-SCNC: 133 MMOL/L (ref 136–145)
SP GR UR STRIP.AUTO: 1.04 (ref 1–1.03)
UROBILINOGEN UR QL STRIP.AUTO: 0.2 E.U./DL
WBC # BLD AUTO: 8.85 THOUSAND/UL (ref 4.31–10.16)

## 2021-12-24 PROCEDURE — 36415 COLL VENOUS BLD VENIPUNCTURE: CPT | Performed by: EMERGENCY MEDICINE

## 2021-12-24 PROCEDURE — 74177 CT ABD & PELVIS W/CONTRAST: CPT

## 2021-12-24 PROCEDURE — 72148 MRI LUMBAR SPINE W/O DYE: CPT

## 2021-12-24 PROCEDURE — G1004 CDSM NDSC: HCPCS

## 2021-12-24 PROCEDURE — 85025 COMPLETE CBC W/AUTO DIFF WBC: CPT | Performed by: EMERGENCY MEDICINE

## 2021-12-24 PROCEDURE — 80053 COMPREHEN METABOLIC PANEL: CPT | Performed by: EMERGENCY MEDICINE

## 2021-12-24 PROCEDURE — 81003 URINALYSIS AUTO W/O SCOPE: CPT | Performed by: EMERGENCY MEDICINE

## 2021-12-24 PROCEDURE — 99285 EMERGENCY DEPT VISIT HI MDM: CPT | Performed by: EMERGENCY MEDICINE

## 2021-12-24 PROCEDURE — 99284 EMERGENCY DEPT VISIT MOD MDM: CPT

## 2021-12-24 PROCEDURE — 96374 THER/PROPH/DIAG INJ IV PUSH: CPT

## 2021-12-24 PROCEDURE — 96361 HYDRATE IV INFUSION ADD-ON: CPT

## 2021-12-24 RX ORDER — KETAMINE HYDROCHLORIDE 50 MG/ML
INJECTION, SOLUTION, CONCENTRATE INTRAMUSCULAR; INTRAVENOUS
Status: COMPLETED
Start: 2021-12-24 | End: 2021-12-24

## 2021-12-24 RX ORDER — ACETAMINOPHEN 325 MG/1
975 TABLET ORAL ONCE
Status: COMPLETED | OUTPATIENT
Start: 2021-12-24 | End: 2021-12-24

## 2021-12-24 RX ORDER — METHOCARBAMOL 500 MG/1
500 TABLET, FILM COATED ORAL ONCE
Status: COMPLETED | OUTPATIENT
Start: 2021-12-24 | End: 2021-12-24

## 2021-12-24 RX ORDER — LIDOCAINE 50 MG/G
1 PATCH TOPICAL ONCE
Status: DISCONTINUED | OUTPATIENT
Start: 2021-12-24 | End: 2021-12-24 | Stop reason: HOSPADM

## 2021-12-24 RX ORDER — MORPHINE SULFATE 4 MG/ML
4 INJECTION, SOLUTION INTRAMUSCULAR; INTRAVENOUS ONCE
Status: DISCONTINUED | OUTPATIENT
Start: 2021-12-24 | End: 2021-12-24 | Stop reason: HOSPADM

## 2021-12-24 RX ORDER — KETOROLAC TROMETHAMINE 30 MG/ML
15 INJECTION, SOLUTION INTRAMUSCULAR; INTRAVENOUS ONCE
Status: COMPLETED | OUTPATIENT
Start: 2021-12-24 | End: 2021-12-24

## 2021-12-24 RX ORDER — METHOCARBAMOL 500 MG/1
500 TABLET, FILM COATED ORAL 2 TIMES DAILY
Qty: 20 TABLET | Refills: 0 | Status: SHIPPED | OUTPATIENT
Start: 2021-12-24 | End: 2021-12-28

## 2021-12-24 RX ORDER — KETAMINE HCL IN NACL, ISO-OSM 100MG/10ML
1 SYRINGE (ML) INJECTION ONCE
Status: DISCONTINUED | OUTPATIENT
Start: 2021-12-24 | End: 2021-12-24 | Stop reason: HOSPADM

## 2021-12-24 RX ADMIN — SODIUM CHLORIDE 1000 ML: 0.9 INJECTION, SOLUTION INTRAVENOUS at 14:42

## 2021-12-24 RX ADMIN — LIDOCAINE 5% 1 PATCH: 700 PATCH TOPICAL at 13:32

## 2021-12-24 RX ADMIN — ACETAMINOPHEN 975 MG: 325 TABLET, FILM COATED ORAL at 13:40

## 2021-12-24 RX ADMIN — METHOCARBAMOL 500 MG: 500 TABLET ORAL at 13:40

## 2021-12-24 RX ADMIN — IOHEXOL 100 ML: 350 INJECTION, SOLUTION INTRAVENOUS at 14:18

## 2021-12-24 RX ADMIN — KETOROLAC TROMETHAMINE 15 MG: 30 INJECTION, SOLUTION INTRAMUSCULAR at 13:41

## 2021-12-27 ENCOUNTER — TELEPHONE (OUTPATIENT)
Dept: FAMILY MEDICINE CLINIC | Facility: CLINIC | Age: 66
End: 2021-12-27

## 2021-12-28 ENCOUNTER — TELEPHONE (OUTPATIENT)
Dept: PHYSICAL THERAPY | Facility: OTHER | Age: 66
End: 2021-12-28

## 2022-01-12 ENCOUNTER — TELEPHONE (OUTPATIENT)
Dept: OTHER | Facility: OTHER | Age: 67
End: 2022-01-12

## 2022-01-12 NOTE — TELEPHONE ENCOUNTER
Pt calling to cancel her consult with [facility] Anastasia @ 1000 due to thinking she has covid, will cb to r/s

## 2022-01-14 ENCOUNTER — TELEPHONE (OUTPATIENT)
Dept: NEUROLOGY | Facility: CLINIC | Age: 67
End: 2022-01-14

## 2022-01-18 ENCOUNTER — TELEPHONE (OUTPATIENT)
Dept: FAMILY MEDICINE CLINIC | Facility: CLINIC | Age: 67
End: 2022-01-18

## 2022-01-18 NOTE — TELEPHONE ENCOUNTER
Patient is having sob, cough, low grade fever, and chills  Patient refused appointment but would like to see if anything can be prescribed

## 2022-01-19 DIAGNOSIS — R06.02 SHORTNESS OF BREATH: Primary | ICD-10-CM

## 2022-01-21 ENCOUNTER — APPOINTMENT (EMERGENCY)
Dept: RADIOLOGY | Facility: HOSPITAL | Age: 67
DRG: 177 | End: 2022-01-21
Payer: COMMERCIAL

## 2022-01-21 ENCOUNTER — APPOINTMENT (INPATIENT)
Dept: RADIOLOGY | Facility: HOSPITAL | Age: 67
DRG: 177 | End: 2022-01-21
Payer: COMMERCIAL

## 2022-01-21 ENCOUNTER — HOSPITAL ENCOUNTER (INPATIENT)
Facility: HOSPITAL | Age: 67
LOS: 10 days | Discharge: HOME HEALTH | DRG: 177 | End: 2022-01-31
Attending: EMERGENCY MEDICINE | Admitting: INTERNAL MEDICINE
Payer: COMMERCIAL

## 2022-01-21 DIAGNOSIS — U07.1 COVID-19 VIRUS INFECTION: ICD-10-CM

## 2022-01-21 DIAGNOSIS — R09.02 HYPOXIA: Primary | ICD-10-CM

## 2022-01-21 DIAGNOSIS — D68.52 PROTHROMBIN MUTATION (HCC): ICD-10-CM

## 2022-01-21 DIAGNOSIS — R06.02 SOB (SHORTNESS OF BREATH): ICD-10-CM

## 2022-01-21 DIAGNOSIS — Z86.711 HX OF PULMONARY EMBOLUS: ICD-10-CM

## 2022-01-21 DIAGNOSIS — J18.9 PNEUMONIA: ICD-10-CM

## 2022-01-21 DIAGNOSIS — I50.9 CHF (CONGESTIVE HEART FAILURE) (HCC): ICD-10-CM

## 2022-01-21 PROBLEM — E83.42 HYPOMAGNESEMIA: Status: ACTIVE | Noted: 2022-01-21

## 2022-01-21 PROBLEM — E87.6 HYPOKALEMIA: Status: ACTIVE | Noted: 2022-01-21

## 2022-01-21 PROBLEM — J96.01 ACUTE RESPIRATORY FAILURE WITH HYPOXIA (HCC): Status: ACTIVE | Noted: 2022-01-21

## 2022-01-21 LAB
2HR DELTA HS TROPONIN: 19 NG/L
4HR DELTA HS TROPONIN: 1 NG/L
ALBUMIN SERPL BCP-MCNC: 2.6 G/DL (ref 3.5–5)
ALP SERPL-CCNC: 71 U/L (ref 46–116)
ALT SERPL W P-5'-P-CCNC: 13 U/L (ref 12–78)
ANION GAP SERPL CALCULATED.3IONS-SCNC: 9 MMOL/L (ref 4–13)
AST SERPL W P-5'-P-CCNC: 34 U/L (ref 5–45)
ATRIAL RATE: 93 BPM
ATRIAL RATE: 99 BPM
BASOPHILS # BLD AUTO: 0.06 THOUSANDS/ΜL (ref 0–0.1)
BASOPHILS NFR BLD AUTO: 1 % (ref 0–1)
BILIRUB DIRECT SERPL-MCNC: 0.27 MG/DL (ref 0–0.2)
BILIRUB SERPL-MCNC: 0.76 MG/DL (ref 0.2–1)
BUN SERPL-MCNC: 23 MG/DL (ref 5–25)
CA-I BLD-SCNC: 1 MMOL/L (ref 1.12–1.32)
CALCIUM SERPL-MCNC: 9 MG/DL (ref 8.3–10.1)
CARDIAC TROPONIN I PNL SERPL HS: 80 NG/L
CARDIAC TROPONIN I PNL SERPL HS: 81 NG/L
CARDIAC TROPONIN I PNL SERPL HS: 99 NG/L
CHLORIDE SERPL-SCNC: 94 MMOL/L (ref 100–108)
CK SERPL-CCNC: 55 U/L (ref 26–192)
CO2 SERPL-SCNC: 28 MMOL/L (ref 21–32)
CREAT SERPL-MCNC: 1.4 MG/DL (ref 0.6–1.3)
CRP SERPL QL: 110 MG/L
D DIMER PPP FEU-MCNC: 3.45 UG/ML FEU
EOSINOPHIL # BLD AUTO: 0.1 THOUSAND/ΜL (ref 0–0.61)
EOSINOPHIL NFR BLD AUTO: 1 % (ref 0–6)
ERYTHROCYTE [DISTWIDTH] IN BLOOD BY AUTOMATED COUNT: 15.2 % (ref 11.6–15.1)
FERRITIN SERPL-MCNC: 121 NG/ML (ref 8–388)
FLUAV RNA RESP QL NAA+PROBE: NEGATIVE
FLUBV RNA RESP QL NAA+PROBE: NEGATIVE
GFR SERPL CREATININE-BSD FRML MDRD: 39 ML/MIN/1.73SQ M
GLUCOSE SERPL-MCNC: 96 MG/DL (ref 65–140)
HCT VFR BLD AUTO: 43.3 % (ref 34.8–46.1)
HGB BLD-MCNC: 14.4 G/DL (ref 11.5–15.4)
IMM GRANULOCYTES # BLD AUTO: 0.18 THOUSAND/UL (ref 0–0.2)
IMM GRANULOCYTES NFR BLD AUTO: 2 % (ref 0–2)
INR PPP: 2.89 (ref 0.84–1.19)
LACTATE SERPL-SCNC: 2 MMOL/L (ref 0.5–2)
LYMPHOCYTES # BLD AUTO: 1.09 THOUSANDS/ΜL (ref 0.6–4.47)
LYMPHOCYTES NFR BLD AUTO: 10 % (ref 14–44)
MAGNESIUM SERPL-MCNC: 0.9 MG/DL (ref 1.6–2.6)
MCH RBC QN AUTO: 32.4 PG (ref 26.8–34.3)
MCHC RBC AUTO-ENTMCNC: 33.3 G/DL (ref 31.4–37.4)
MCV RBC AUTO: 97 FL (ref 82–98)
MONOCYTES # BLD AUTO: 1.18 THOUSAND/ΜL (ref 0.17–1.22)
MONOCYTES NFR BLD AUTO: 11 % (ref 4–12)
NEUTROPHILS # BLD AUTO: 8.28 THOUSANDS/ΜL (ref 1.85–7.62)
NEUTS SEG NFR BLD AUTO: 75 % (ref 43–75)
NRBC BLD AUTO-RTO: 0 /100 WBCS
NT-PROBNP SERPL-MCNC: 9960 PG/ML
P AXIS: 21 DEGREES
P AXIS: 52 DEGREES
PLATELET # BLD AUTO: 137 THOUSANDS/UL (ref 149–390)
PMV BLD AUTO: 10.9 FL (ref 8.9–12.7)
POTASSIUM SERPL-SCNC: 3.3 MMOL/L (ref 3.5–5.3)
PR INTERVAL: 128 MS
PR INTERVAL: 134 MS
PROCALCITONIN SERPL-MCNC: 0.19 NG/ML
PROT SERPL-MCNC: 7.6 G/DL (ref 6.4–8.2)
PROTHROMBIN TIME: 28.8 SECONDS (ref 11.6–14.5)
QRS AXIS: -26 DEGREES
QRS AXIS: -32 DEGREES
QRSD INTERVAL: 88 MS
QRSD INTERVAL: 88 MS
QT INTERVAL: 388 MS
QT INTERVAL: 394 MS
QTC INTERVAL: 489 MS
QTC INTERVAL: 497 MS
RBC # BLD AUTO: 4.45 MILLION/UL (ref 3.81–5.12)
RSV RNA RESP QL NAA+PROBE: NEGATIVE
SARS-COV-2 RNA RESP QL NAA+PROBE: NEGATIVE
SODIUM SERPL-SCNC: 131 MMOL/L (ref 136–145)
T WAVE AXIS: -67 DEGREES
T WAVE AXIS: -69 DEGREES
VENTRICULAR RATE: 93 BPM
VENTRICULAR RATE: 99 BPM
WBC # BLD AUTO: 10.89 THOUSAND/UL (ref 4.31–10.16)

## 2022-01-21 PROCEDURE — 85730 THROMBOPLASTIN TIME PARTIAL: CPT | Performed by: INTERNAL MEDICINE

## 2022-01-21 PROCEDURE — 93321 DOPPLER ECHO F-UP/LMTD STD: CPT | Performed by: EMERGENCY MEDICINE

## 2022-01-21 PROCEDURE — 96365 THER/PROPH/DIAG IV INF INIT: CPT

## 2022-01-21 PROCEDURE — 93308 TTE F-UP OR LMTD: CPT | Performed by: EMERGENCY MEDICINE

## 2022-01-21 PROCEDURE — XW0DXM6 INTRODUCTION OF BARICITINIB INTO MOUTH AND PHARYNX, EXTERNAL APPROACH, NEW TECHNOLOGY GROUP 6: ICD-10-PCS | Performed by: FAMILY MEDICINE

## 2022-01-21 PROCEDURE — 99223 1ST HOSP IP/OBS HIGH 75: CPT | Performed by: FAMILY MEDICINE

## 2022-01-21 PROCEDURE — 96375 TX/PRO/DX INJ NEW DRUG ADDON: CPT

## 2022-01-21 PROCEDURE — 71275 CT ANGIOGRAPHY CHEST: CPT

## 2022-01-21 PROCEDURE — 87449 NOS EACH ORGANISM AG IA: CPT | Performed by: FAMILY MEDICINE

## 2022-01-21 PROCEDURE — 99285 EMERGENCY DEPT VISIT HI MDM: CPT

## 2022-01-21 PROCEDURE — 86140 C-REACTIVE PROTEIN: CPT

## 2022-01-21 PROCEDURE — 83735 ASSAY OF MAGNESIUM: CPT

## 2022-01-21 PROCEDURE — 36415 COLL VENOUS BLD VENIPUNCTURE: CPT

## 2022-01-21 PROCEDURE — 94760 N-INVAS EAR/PLS OXIMETRY 1: CPT

## 2022-01-21 PROCEDURE — 85379 FIBRIN DEGRADATION QUANT: CPT | Performed by: EMERGENCY MEDICINE

## 2022-01-21 PROCEDURE — 83605 ASSAY OF LACTIC ACID: CPT

## 2022-01-21 PROCEDURE — 87040 BLOOD CULTURE FOR BACTERIA: CPT

## 2022-01-21 PROCEDURE — 84145 PROCALCITONIN (PCT): CPT

## 2022-01-21 PROCEDURE — 83880 ASSAY OF NATRIURETIC PEPTIDE: CPT

## 2022-01-21 PROCEDURE — 0241U HB NFCT DS VIR RESP RNA 4 TRGT: CPT

## 2022-01-21 PROCEDURE — 93010 ELECTROCARDIOGRAM REPORT: CPT | Performed by: INTERNAL MEDICINE

## 2022-01-21 PROCEDURE — 71045 X-RAY EXAM CHEST 1 VIEW: CPT

## 2022-01-21 PROCEDURE — 82728 ASSAY OF FERRITIN: CPT

## 2022-01-21 PROCEDURE — 99291 CRITICAL CARE FIRST HOUR: CPT | Performed by: EMERGENCY MEDICINE

## 2022-01-21 PROCEDURE — 80048 BASIC METABOLIC PNL TOTAL CA: CPT

## 2022-01-21 PROCEDURE — 80076 HEPATIC FUNCTION PANEL: CPT | Performed by: FAMILY MEDICINE

## 2022-01-21 PROCEDURE — 85610 PROTHROMBIN TIME: CPT

## 2022-01-21 PROCEDURE — 85025 COMPLETE CBC W/AUTO DIFF WBC: CPT

## 2022-01-21 PROCEDURE — 93005 ELECTROCARDIOGRAM TRACING: CPT

## 2022-01-21 PROCEDURE — 96367 TX/PROPH/DG ADDL SEQ IV INF: CPT

## 2022-01-21 PROCEDURE — 87070 CULTURE OTHR SPECIMN AEROBIC: CPT | Performed by: FAMILY MEDICINE

## 2022-01-21 PROCEDURE — 87205 SMEAR GRAM STAIN: CPT | Performed by: FAMILY MEDICINE

## 2022-01-21 PROCEDURE — 82330 ASSAY OF CALCIUM: CPT

## 2022-01-21 PROCEDURE — 84484 ASSAY OF TROPONIN QUANT: CPT

## 2022-01-21 PROCEDURE — 82550 ASSAY OF CK (CPK): CPT

## 2022-01-21 RX ORDER — HEPARIN SODIUM 10000 [USP'U]/100ML
3-30 INJECTION, SOLUTION INTRAVENOUS
Status: DISCONTINUED | OUTPATIENT
Start: 2022-01-21 | End: 2022-01-26

## 2022-01-21 RX ORDER — ATORVASTATIN CALCIUM 40 MG/1
40 TABLET, FILM COATED ORAL EVERY EVENING
Status: DISCONTINUED | OUTPATIENT
Start: 2022-01-21 | End: 2022-01-31 | Stop reason: HOSPADM

## 2022-01-21 RX ORDER — ACETAMINOPHEN 325 MG/1
650 TABLET ORAL EVERY 6 HOURS PRN
Status: DISCONTINUED | OUTPATIENT
Start: 2022-01-21 | End: 2022-01-31 | Stop reason: HOSPADM

## 2022-01-21 RX ORDER — DEXAMETHASONE SODIUM PHOSPHATE 4 MG/ML
8 INJECTION, SOLUTION INTRA-ARTICULAR; INTRALESIONAL; INTRAMUSCULAR; INTRAVENOUS; SOFT TISSUE ONCE
Status: COMPLETED | OUTPATIENT
Start: 2022-01-21 | End: 2022-01-21

## 2022-01-21 RX ORDER — METHYLPREDNISOLONE SODIUM SUCCINATE 40 MG/ML
40 INJECTION, POWDER, LYOPHILIZED, FOR SOLUTION INTRAMUSCULAR; INTRAVENOUS EVERY 8 HOURS SCHEDULED
Status: DISCONTINUED | OUTPATIENT
Start: 2022-01-21 | End: 2022-01-22

## 2022-01-21 RX ORDER — ATENOLOL 25 MG/1
25 TABLET ORAL DAILY
Status: DISCONTINUED | OUTPATIENT
Start: 2022-01-22 | End: 2022-01-26

## 2022-01-21 RX ORDER — BENZONATATE 100 MG/1
100 CAPSULE ORAL 3 TIMES DAILY PRN
Status: DISCONTINUED | OUTPATIENT
Start: 2022-01-21 | End: 2022-01-31 | Stop reason: HOSPADM

## 2022-01-21 RX ORDER — ONDANSETRON 2 MG/ML
4 INJECTION INTRAMUSCULAR; INTRAVENOUS EVERY 6 HOURS PRN
Status: DISCONTINUED | OUTPATIENT
Start: 2022-01-21 | End: 2022-01-31 | Stop reason: HOSPADM

## 2022-01-21 RX ORDER — GABAPENTIN 300 MG/1
300 CAPSULE ORAL 3 TIMES DAILY
Status: DISCONTINUED | OUTPATIENT
Start: 2022-01-21 | End: 2022-01-31 | Stop reason: HOSPADM

## 2022-01-21 RX ORDER — POTASSIUM CHLORIDE 20 MEQ/1
40 TABLET, EXTENDED RELEASE ORAL ONCE
Status: COMPLETED | OUTPATIENT
Start: 2022-01-21 | End: 2022-01-21

## 2022-01-21 RX ORDER — MAGNESIUM SULFATE HEPTAHYDRATE 40 MG/ML
2 INJECTION, SOLUTION INTRAVENOUS ONCE
Status: COMPLETED | OUTPATIENT
Start: 2022-01-21 | End: 2022-01-21

## 2022-01-21 RX ORDER — ALBUTEROL SULFATE 2.5 MG/3ML
2.5 SOLUTION RESPIRATORY (INHALATION) EVERY 4 HOURS PRN
Status: DISCONTINUED | OUTPATIENT
Start: 2022-01-21 | End: 2022-01-31 | Stop reason: HOSPADM

## 2022-01-21 RX ORDER — HEPARIN SODIUM 1000 [USP'U]/ML
7200 INJECTION, SOLUTION INTRAVENOUS; SUBCUTANEOUS ONCE
Status: COMPLETED | OUTPATIENT
Start: 2022-01-21 | End: 2022-01-21

## 2022-01-21 RX ORDER — FUROSEMIDE 10 MG/ML
40 INJECTION INTRAMUSCULAR; INTRAVENOUS ONCE
Status: COMPLETED | OUTPATIENT
Start: 2022-01-21 | End: 2022-01-21

## 2022-01-21 RX ORDER — PANTOPRAZOLE SODIUM 20 MG/1
20 TABLET, DELAYED RELEASE ORAL DAILY
Status: DISCONTINUED | OUTPATIENT
Start: 2022-01-22 | End: 2022-01-31 | Stop reason: HOSPADM

## 2022-01-21 RX ORDER — HEPARIN SODIUM 1000 [USP'U]/ML
7200 INJECTION, SOLUTION INTRAVENOUS; SUBCUTANEOUS
Status: DISCONTINUED | OUTPATIENT
Start: 2022-01-21 | End: 2022-01-26

## 2022-01-21 RX ORDER — HEPARIN SODIUM 1000 [USP'U]/ML
3600 INJECTION, SOLUTION INTRAVENOUS; SUBCUTANEOUS
Status: DISCONTINUED | OUTPATIENT
Start: 2022-01-21 | End: 2022-01-26

## 2022-01-21 RX ORDER — TRAZODONE HYDROCHLORIDE 50 MG/1
25 TABLET ORAL
Status: DISCONTINUED | OUTPATIENT
Start: 2022-01-21 | End: 2022-01-31 | Stop reason: HOSPADM

## 2022-01-21 RX ORDER — IPRATROPIUM BROMIDE AND ALBUTEROL SULFATE 2.5; .5 MG/3ML; MG/3ML
3 SOLUTION RESPIRATORY (INHALATION)
Status: DISCONTINUED | OUTPATIENT
Start: 2022-01-21 | End: 2022-01-21

## 2022-01-21 RX ORDER — ASPIRIN 81 MG/1
81 TABLET ORAL DAILY
Status: DISCONTINUED | OUTPATIENT
Start: 2022-01-22 | End: 2022-01-31 | Stop reason: HOSPADM

## 2022-01-21 RX ORDER — SODIUM CHLORIDE, SODIUM GLUCONATE, SODIUM ACETATE, POTASSIUM CHLORIDE, MAGNESIUM CHLORIDE, SODIUM PHOSPHATE, DIBASIC, AND POTASSIUM PHOSPHATE .53; .5; .37; .037; .03; .012; .00082 G/100ML; G/100ML; G/100ML; G/100ML; G/100ML; G/100ML; G/100ML
1000 INJECTION, SOLUTION INTRAVENOUS ONCE
Status: COMPLETED | OUTPATIENT
Start: 2022-01-21 | End: 2022-01-21

## 2022-01-21 RX ORDER — LEVOFLOXACIN 5 MG/ML
750 INJECTION, SOLUTION INTRAVENOUS ONCE
Status: COMPLETED | OUTPATIENT
Start: 2022-01-21 | End: 2022-01-21

## 2022-01-21 RX ADMIN — DEXAMETHASONE SODIUM PHOSPHATE 8 MG: 4 INJECTION, SOLUTION INTRA-ARTICULAR; INTRALESIONAL; INTRAMUSCULAR; INTRAVENOUS; SOFT TISSUE at 14:35

## 2022-01-21 RX ADMIN — SODIUM CHLORIDE, SODIUM GLUCONATE, SODIUM ACETATE, POTASSIUM CHLORIDE, MAGNESIUM CHLORIDE, SODIUM PHOSPHATE, DIBASIC, AND POTASSIUM PHOSPHATE 1000 ML: .53; .5; .37; .037; .03; .012; .00082 INJECTION, SOLUTION INTRAVENOUS at 14:35

## 2022-01-21 RX ADMIN — METHYLPREDNISOLONE SODIUM SUCCINATE 40 MG: 40 INJECTION, POWDER, FOR SOLUTION INTRAMUSCULAR; INTRAVENOUS at 22:06

## 2022-01-21 RX ADMIN — GABAPENTIN 300 MG: 300 CAPSULE ORAL at 22:06

## 2022-01-21 RX ADMIN — POTASSIUM CHLORIDE 40 MEQ: 1500 TABLET, EXTENDED RELEASE ORAL at 16:56

## 2022-01-21 RX ADMIN — FUROSEMIDE 40 MG: 10 INJECTION, SOLUTION INTRAMUSCULAR; INTRAVENOUS at 18:55

## 2022-01-21 RX ADMIN — HEPARIN SODIUM 18 UNITS/KG/HR: 10000 INJECTION, SOLUTION INTRAVENOUS at 23:33

## 2022-01-21 RX ADMIN — HEPARIN SODIUM 7200 UNITS: 1000 INJECTION INTRAVENOUS; SUBCUTANEOUS at 23:33

## 2022-01-21 RX ADMIN — LEVOFLOXACIN 750 MG: 5 INJECTION, SOLUTION INTRAVENOUS at 15:53

## 2022-01-21 RX ADMIN — MAGNESIUM SULFATE HEPTAHYDRATE 2 G: 40 INJECTION, SOLUTION INTRAVENOUS at 17:07

## 2022-01-21 RX ADMIN — ATORVASTATIN CALCIUM 40 MG: 40 TABLET, FILM COATED ORAL at 22:05

## 2022-01-21 RX ADMIN — IOHEXOL 85 ML: 350 INJECTION, SOLUTION INTRAVENOUS at 21:46

## 2022-01-21 NOTE — H&P
[address] [facility] 1955, 77 y o  female MRN: [id number]  Unit/Bed#: ED 11 Encounter: [id number]  Primary Care Provider: Allyssa Rios MD   Date and time admitted to hospital: 1/21/2022  1:43 PM    * Community acquired pneumonia of left lower lobe of lung  Assessment & Plan  Negative COVID/FLU/RSV  CXR reveals Left sided pneumonia  Start Rocephin  Obtain sputum culture, urinary legionella/strep  pending BCx  duoneb q6 hours  Wean off oxygen as able to    Acute respiratory failure with hypoxia ([facility] [address] )  Assessment & Plan  Secondary to pneumonia requiring 5L oxygen in ED  duoneb  respiratory protocol, incentive spirometry    Obesity, morbid ([facility] [address] )  Assessment & Plan  Lifestyle modification    Hx of pulmonary embolus  Assessment & Plan  · Occurred post-operatively in 2015   · S/p IVC filter removal in 2016   · Also on Eliquis      Prothrombin mutation ([facility] [address] )  Assessment & Plan  Continue Eliquis    Depressive disorder  Assessment & Plan  Resume fluoxetine and trazodone    Benign essential hypertension  Assessment & Plan  Resume atenolol    VTE Prophylaxis: Apixaban (Eliquis)  / sequential compression device   Code Status: full code       Anticipated Length of Stay:  Patient will be admitted on an Inpatient basis with an anticipated length of stay of  > 2 midnights  Justification for Hospital Stay: pneumonia    Total Time for Visit, including Counseling / Coordination of Care: 60 minutes  Greater than 50% of this total time spent on direct patient counseling and coordination of care  Chief Complaint:   dyspnea    History of Present Illness:    Broderick Lackey is a 77 y o  female  76 yo female with history of PE, prothrombin gene mutation on Eliquis, CVA, smoker, GERD, HTN, depressive disorder, dyslipidemia presented with progressive worsening shortness of breath for past couple of weeks  All the patient was exposed to COVID-19, in ER COVID-19 was negative    Patient was found to be hypoxic requiring up to 5 L oxygen via nasal cannula  Patient also complains of fever, chills and productive cough  Labs are remarkable for elevated proBNP, although no signs of volume overload  Chest x-ray revealed left-sided pneumonia  Review of Systems:    Review of Systems   Constitutional: Positive for chills and fever  Negative for activity change and appetite change  HENT: Negative for congestion and sore throat  Eyes: Negative for pain and visual disturbance  Respiratory: Positive for shortness of breath  Negative for cough and wheezing  Cardiovascular: Negative for chest pain, palpitations and leg swelling  Gastrointestinal: Negative for abdominal distention and abdominal pain  Endocrine: Negative for polyuria  Genitourinary: Negative for difficulty urinating and dysuria  Musculoskeletal: Negative for arthralgias and back pain  Skin: Negative for rash and wound  Allergic/Immunologic: Negative for immunocompromised state  Neurological: Negative for dizziness, speech difficulty and headaches  Hematological: Negative for adenopathy  Psychiatric/Behavioral: Negative for sleep disturbance  The patient is not hyperactive  Past Medical and Surgical History:     Past Medical History:   Diagnosis Date    Acid reflux     Anxiety     Arthritis     Back pain     Carpal tunnel syndrome, bilateral     Depression     Endometrial cancer ([facility] [address] ) 1997    Hiatal hernia     History of DVT of lower extremity     Right leg-had IVc filter  removal today-6/6/2016    History of pulmonary embolism     History of uterine cancer     Was stage 3- uterine, ovaries-hysterectomy    Hypercholesteremia     Hypertension     Left leg pain     Lumbar herniated disc     Tumor cells, benign     In right atrium   Probably congenital     Varicose vein of leg     Wears glasses        Past Surgical History:   Procedure Laterality Date    COLONOSCOPY     [facility]      DILATION AND CURETTAGE OF UTERUS      ESOPHAGOGASTRODUODENOSCOPY      FOOT SURGERY      LAST ASSESSED: [id number]    HYSTERECTOMY  1997    uterine cancer-hx    IR STROKE ALERT  10/20/2021    JOINT REPLACEMENT      b/l   THR    OOPHORECTOMY Bilateral 1997    GA COLONOSCOPY FLX DX W/COLLJ SPEC WHEN PFRMD N/A 6/14/2017    Procedure: EGD AND COLONOSCOPY;  Surgeon: Benjamin Salazar MD;  [facility]; Service: Gastroenterology    GA TOTAL HIP ARTHROPLASTY Left 4/13/2016    Procedure: ARTHROPLASTY HIP TOTAL;  Surgeon: Shalom Claire DO;  [facility];  Service: Orthopedics    TONSILLECTOMY AND ADENOIDECTOMY      TOTAL HIP ARTHROPLASTY Right     LAST ASSESSED: [id number]       Meds/Allergies:    Prior to Admission medications    Medication Sig Start Date End Date Taking?  Authorizing Provider   albuterol (2 5 mg/3 mL) 0 083 % nebulizer solution Take 3 mL (2 5 mg total) by nebulization every 4 (four) hours as needed for wheezing or shortness of breath  Patient not taking: Reported on 11/26/2021  10/23/21   Geovany Coates DO   apixaban (ELIQUIS) 5 mg Take 2 tablets (10 mg total) by mouth 2 (two) times a day for 12 doses 10/23/21 10/29/21  Geovany Coates DO   apixaban (ELIQUIS) 5 mg Take 1 tablet (5 mg total) by mouth 2 (two) times a day 12/6/21   Salvador Sacks, PA-C   aspirin (ECOTRIN LOW STRENGTH) 81 mg EC tablet Take 1 tablet (81 mg total) by mouth daily 10/24/21   Geovany Coates DO   atenolol (TENORMIN) 25 mg tablet Take 1 tablet (25 mg total) by mouth daily 11/12/21   Nida Werner MD   atorvastatin (LIPITOR) 40 mg tablet Take 1 tablet (40 mg total) by mouth every evening 10/23/21   Geovany Coates DO   cyclobenzaprine (FLEXERIL) 5 mg tablet Take 1 tablet (5 mg total) by mouth 3 (three) times a day as needed for muscle spasms 12/28/21   Nida Werner MD   FLUoxetine (PROzac) 10 mg capsule Take 3 capsules (30 mg total) by mouth daily 10/24/21   Greer Hove, DO   gabapentin (NEURONTIN) 300 mg capsule Take 300 mg by mouth 3 (three) times a day    Historical Provider, MD   nicotine (NICODERM CQ) 14 mg/24hr TD 24 hr patch Place 1 patch on the skin daily  Patient not taking: Reported on 11/26/2021  10/24/21   Zacarias Smith DO   pantoprazole (PROTONIX) 20 mg tablet TAKE 1 TABLET BY MOUTH EVERY DAY 11/10/21   Zuleima Wetzel MD     I have reviewed home medications using allscripts  Allergies: Allergies   Allergen Reactions    Percocet [Oxycodone-Acetaminophen] Hives    Amlodipine     Percocet  [Oxycodone-Acetaminophen]        Social History:     Marital Status:       Substance Use History:   Social History     Substance and Sexual Activity   Alcohol Use Yes    Comment: 2 or 3 monthly     Social History     Tobacco Use   Smoking Status Former Smoker    Packs/day: 0 50    Years: 20 00    Pack years: 10 00    Types: Cigarettes   Smokeless Tobacco Never Used   Tobacco Comment    Smokes 5 cigarettes daily,hx age 12 to 2012-2ppd     Social History     Substance and Sexual Activity   Drug Use No       Family History:    Family History   Problem Relation Age of Onset    Heart failure Father     Colon cancer Father 62    Arthritis Family        Physical Exam:     Vitals:   Blood Pressure: 105/69 (01/21/22 1600)  Pulse: 96 (01/21/22 1600)  Temperature: 98 6 °F (37 °C) (01/21/22 1329)  Temp Source: Tympanic (01/21/22 1329)  Respirations: 20 (01/21/22 1530)  SpO2: 94 % (01/21/22 1600)    Physical Exam  Vitals and nursing note reviewed  Constitutional:       General: She is not in acute distress  Appearance: She is well-developed  HENT:      Head: Normocephalic and atraumatic  Eyes:      Conjunctiva/sclera: Conjunctivae normal    Cardiovascular:      Rate and Rhythm: Normal rate and regular rhythm  Heart sounds: No murmur heard  Pulmonary:      Effort: Pulmonary effort is normal  No respiratory distress  Breath sounds: Wheezing present  Abdominal:      Palpations: Abdomen is soft  Tenderness: There is no abdominal tenderness  Musculoskeletal:      Cervical back: Neck supple  Skin:     General: Skin is warm and dry  Neurological:      Mental Status: She is alert  Additional Data:     Lab Results: I have personally reviewed pertinent reports  Results from last 7 days   Lab Units 01/21/22  1415   WBC Thousand/uL 10 89*   HEMOGLOBIN g/dL 14 4   HEMATOCRIT % 43 3   PLATELETS Thousands/uL 137*   NEUTROS PCT % 75   LYMPHS PCT % 10*   MONOS PCT % 11   EOS PCT % 1     Results from last 7 days   Lab Units 01/21/22  1415   SODIUM mmol/L 131*   POTASSIUM mmol/L 3 3*   CHLORIDE mmol/L 94*   CO2 mmol/L 28   BUN mg/dL 23   CREATININE mg/dL 1 40*   ANION GAP mmol/L 9   CALCIUM mg/dL 9 0   GLUCOSE RANDOM mg/dL 96                 Results from last 7 days   Lab Units 01/21/22  1415   LACTIC ACID mmol/L 2 0   PROCALCITONIN ng/ml 0 19       Imaging: I have personally reviewed pertinent reports  XR chest 1 view portable   ED Interpretation by Marbella Villanueva MD (01/21 2461)   LEFT lower lobe PNA  Final Result by Jefferson Ray MD (01/21 1440)      Left-sided pneumonia  Recommend follow-up to ensure complete clearing after treatment  Workstation performed: [id number]                ** Please Note: This note has been constructed using a voice recognition system   **

## 2022-01-21 NOTE — ED NOTES
Pt pulse ox 87% on 6LPM   Pt encouraged to take deep slow breaths  Pt visibly tired and working hard to breathe  Spoke with Dr Melvin Quiros with req for BiPap  Stated she will place orders         Gina Graham RN  01/21/22 6557

## 2022-01-21 NOTE — ASSESSMENT & PLAN NOTE
Acute respiratory distress noted to be secondary to pneumonia however also noted significantly elevated D-dimer at 3 4  Will obtain stat CTA PE to rule out acute pulmonary embolism although patient is on Eliquis  Also noted to have elevated INR, at 2 89, not on coumadin  Obtain liver panel    Also patient has increasing oxygen requirement, will order BiPAP

## 2022-01-21 NOTE — ED NOTES
Pt found in room with nasal cannula around neck  Pt stated she needed a break from it  Advised pt that she needed to keep oxygen on because it dropped significantly  Pt verbalized understanding and now has cannula in place  Pt req bedside chair to sit in  While transferring to chair, accidentally removed IV in hand  Dressing applied  Pt oxygen level increasing slowly       Salo Stock RN  01/21/22 3131

## 2022-01-21 NOTE — ED PROCEDURE NOTE
Procedure  POC Cardiac US    Date/Time: 1/21/2022 3:08 PM  Performed by: Ana Watson MD  Authorized by: Ana Watson MD     Patient location:  ED  Other Assisting Provider: Yes (comment) (Dr Gisel Adames)    Procedure details:     Exam Type:  Diagnostic    Indications: dyspnea      Assessment / Evaluation for: cardiac function      Exam Type: initial exam      Image quality: diagnostic      Image availability:  Images available in PACS  Patient Details:     Cardiac Rhythm:  Regular    Mechanical ventilation: No    Cardiac findings:     Echo technique: limited 2D      Views obtained: parasternal long axis, parasternal short axis and apical      Pericardial effusion: absent      Tamponade physiology: absent      Wall motion: normal      LV systolic function: normal      RV dilation: none    Cardiac Calculations:     TAPSE (RV function):  0 5    MAPSE (LV function):  1 9                     Ana Watson MD  01/21/22 9320

## 2022-01-21 NOTE — ASSESSMENT & PLAN NOTE
Negative COVID/FLU/RSV  CXR reveals Left sided pneumonia  Start Rocephin  Obtain sputum culture, urinary legionella/strep  pending BCx  duoneb q6 hours  Wean off oxygen as able to

## 2022-01-21 NOTE — PROGRESS NOTES
[address] [facility]  Progress Note - Jordan Edmonds 1955, 77 y o  female MRN: [id number]  Unit/Bed#: ED 11 Encounter: [id number]  Primary Care Provider: Michael Councilman, MD   Date and time admitted to hospital: 1/21/2022  1:43 PM    Hypokalemia  Assessment & Plan  Potassium 3 3, replace    Hypomagnesemia  Assessment & Plan  Noted magnesium is 0 9, replace      Acute respiratory failure with hypoxia [facility])  Assessment & Plan  Acute respiratory distress noted to be secondary to pneumonia versus COPD however also noted significantly elevated D-dimer at 3 4  Will obtain stat CTA PE to rule out acute pulmonary embolism although patient is on Eliquis  Also noted to have elevated INR, at 2 89, not on coumadin  Obtain liver panel  Also patient has increasing oxygen requirement, will order high-flow nasal cannula  Start patient on Solu-Medrol IV for possible COPD exacerbation, although never been formally diagnosed    With elevated proBNP, I will also order 1 dose of Lasix  Will consult pulmonology

## 2022-01-21 NOTE — ASSESSMENT & PLAN NOTE
Secondary to pneumonia requiring 5L oxygen in ED  duoneb  respiratory protocol, incentive spirometry

## 2022-01-21 NOTE — ED ATTENDING ATTESTATION
Final Diagnosis:  1  Hypoxia    2  SOB (shortness of breath)    3  Pneumonia    4  CHF (congestive heart failure) ([facility] [address] )    5  Prothrombin mutation [facility])      ED Course as of 01/28/22 1303   Fri Jan 21, 2022   1505 Calcium, Ionized(!): 1 00   1505 WBC(!): 10 89   1505 Hemoglobin: 14 4   1506 nRBC: 0   1506 Platelet Count(!): 542       IMarv MD, saw and evaluated the patient  All available labs and X-rays were ordered by me or the resident and have been reviewed by myself  I discussed the patient with the resident / non-physician and agree with the resident's / non-physician practitioner's findings and plan as documented in the resident's / non-physician practicitioner's note, except where noted  At this point, I agree with the current assessment done in the ED  I was present during key portions of all procedures performed unless otherwise stated  Chief Complaint   Patient presents with    Shortness of Breath     family all had covid on 1/10, pt had symptoms but was not tested  never got better, remains with SOB     This is a 77 y o  female presenting for evaluation of COVID  She has been having symptoms even before Jan 10th; trying home medications and has had progressive dyspnea  54% on RA and hypotensive 84/70 in the waiting room  She's not normally on oxygen  No f/ch/n/v/cp  No diarrhea  Productive cough for weeks  No leg tenderness/swelling ? eliquis for DVTs  PMH:   has a past medical history of Acid reflux, Anxiety, Arthritis, Back pain, Carpal tunnel syndrome, bilateral, Depression, Endometrial cancer ([facility] [address] ) (1997), Hiatal hernia, History of DVT of lower extremity, History of pulmonary embolism, History of uterine cancer, Hypercholesteremia, Hypertension, Left leg pain, Lumbar herniated disc, Tumor cells, benign, Varicose vein of leg, and Wears glasses  PSH:   has a past surgical history that includes Dental surgery;  Tonsillectomy and adenoidectomy; Dilation and curettage of uterus; Colonoscopy; Esophagogastroduodenoscopy; pr total hip arthroplasty (Left, 4/13/2016); Joint replacement; pr colonoscopy flx dx w/collj spec when pfrmd (N/A, 6/14/2017); Foot surgery; Total hip arthroplasty (Right); Hysterectomy (1997); Oophorectomy (Bilateral, 1997); and IR stroke alert (10/20/2021)  Social:  Social History     Substance and Sexual Activity   Alcohol Use Yes    Comment: 2 or 3 monthly     Social History     Tobacco Use   Smoking Status Former Smoker    Packs/day: 0 50    Years: 50 00    Pack years: 25 00    Types: Cigarettes   Smokeless Tobacco Never Used   Tobacco Comment    none     Social History     Substance and Sexual Activity   Drug Use No     PE:  Vitals:    01/28/22 0118 01/28/22 0600 01/28/22 0839 01/28/22 0915   BP: 129/66   105/64   BP Location:       Pulse: 68   102   Resp: 20      Temp:       TempSrc:       SpO2: 96%  91% (!) 85%   Weight:  95 kg (209 lb 7 oz)     Height:       General: VSS, NAD, awake, alert  Well-nourished, well-developed  Appears stated age  Head: Normocephalic, atraumatic, nontender  Eyes: PERRL, EOM-I  No diplopia  No hyphema  No subconjunctival hemorrhages  Symmetrical lids  ENTAtraumatic external nose and ears  MMM  No stridor  Normal phonation  No drooling  Base of mouth is soft  No mastoid tenderness  Neck: Symmetric, trachea midline  No JVD  CV: Peripheral pulses +2 throughout  No chest wall tenderness  Lungs:   Unlabored   No retractions  No crepitus  No tachypnea  No paradoxical motion  Abd: +BS, soft, NT/ND    MSK:   FROM   No lower extremity edema  Back:   No CVAT  Skin: Dry, intact  Neuro: AAOx3, GCS 15, CN II-XII grossly intact  Motor grossly intact  Psychiatric/Behavioral: Appropriate mood and affect   Exam: deferred  A:  - Hypoxia  - Hypotension  - dyspnea  P:  - Cardiac workup  - Discussed CTA vs d-dimer ? can start with d-dimer/CXR  VERY high suspcion this is high COVID      - 13 point ROS was performed and all are normal unless stated in the history above  - Nursing note reviewed  Vitals reviewed  - Orders placed by myself and/or advanced practitioner / resident     - Previous chart was reviewed  - No language barrier    - History obtained from patient  - There are no limitations to the history obtained  - Critical care time: 36 minutes  - Critical care time was exclusive of seperately bilable procedures and treating other patients as well as teaching time     - Critical care was necessary to treat or prevent imminent or life-threatening deterioration of the following condition: hypoxia/hypotension  - Critical care time was spent personally by me on the following activities as well as the above as per the ED course and rest of chart: blood draw for specimens, obtaining history from patient / surrogate, developement of a treatment plan, discussions with consultants, evaluation of patient's response to the treatment, examination of the patient, ordering/performing treatements and interventions, re-evaluation of the patient's condition, review of old charts, ordering/reviewing laboratory studies, ordering/reviewing of radiographic studies    Code Status: Level 1 - Full Code  Advance Directive and Living Will:      Power of :    POLST:      Medications   albuterol inhalation solution 2 5 mg (has no administration in time range)   aspirin (ECOTRIN LOW STRENGTH) EC tablet 81 mg (81 mg Oral Given 1/28/22 1008)   atorvastatin (LIPITOR) tablet 40 mg (40 mg Oral Given 1/27/22 1743)   FLUoxetine (PROzac) capsule 30 mg (30 mg Oral Given 1/27/22 2240)   gabapentin (NEURONTIN) capsule 300 mg (300 mg Oral Given 1/28/22 1008)   pantoprazole (PROTONIX) EC tablet 20 mg (20 mg Oral Given 1/28/22 1008)   traZODone (DESYREL) tablet 25 mg (25 mg Oral Given 1/27/22 2155)   ondansetron (ZOFRAN) injection 4 mg (has no administration in time range)   benzonatate (TESSALON PERLES) capsule 100 mg (has no administration in time range)   acetaminophen (TYLENOL) tablet 650 mg (has no administration in time range)   dexamethasone (DECADRON) injection 9 08 mg (9 08 mg Intravenous Given 1/28/22 1006)   Baricitinib (OLUMIANT) (COVID EUA) tablet 2 mg (2 mg Oral Given 1/28/22 1008)   melatonin tablet 6 mg (6 mg Oral Given 1/27/22 2154)   fluticasone-vilanterol (BREO ELLIPTA) 100-25 mcg/inh inhaler 1 puff (1 puff Inhalation Given 1/28/22 1029)   dabigatran etexilate (PRADAXA) capsule 150 mg (150 mg Oral Given 1/28/22 1008)   dornase madiha (PULMOZYME) inhalation solution 2 5 mg (2 5 mg Inhalation Given 1/28/22 0832)   dexamethasone (DECADRON) injection 8 mg (8 mg Intravenous Given 1/21/22 1435)   multi-electrolyte (ISOLYTE-S PH 7 4) bolus 1,000 mL (0 mL Intravenous Stopped 1/21/22 1552)   levofloxacin (LEVAQUIN) IVPB (premix in dextrose) 750 mg 150 mL (0 mg Intravenous Stopped 1/21/22 1723)   magnesium sulfate 2 g/50 mL IVPB (premix) 2 g (0 g Intravenous Stopped 1/21/22 1907)   potassium chloride (K-DUR,KLOR-CON) CR tablet 40 mEq (40 mEq Oral Given 1/21/22 1656)   furosemide (LASIX) injection 40 mg (40 mg Intravenous Given 1/21/22 1855)   iohexol (OMNIPAQUE) 350 MG/ML injection (SINGLE-DOSE) 85 mL (85 mL Intravenous Given 1/21/22 2146)   heparin (porcine) injection 7,200 Units (7,200 Units Intravenous Given 1/21/22 2333)   dornase madiha (PULMOZYME) inhalation solution 2 5 mg (2 5 mg Inhalation Given 1/26/22 2055)   magnesium sulfate 4 g/100 mL IVPB (premix) 4 g (0 g Intravenous Stopped 1/26/22 1619)   albumin human (FLEXBUMIN) 5 % injection 12 5 g (0 g Intravenous Stopped 1/26/22 1530)   albumin human (FLEXBUMIN) 5 % injection 12 5 g (12 5 g Intravenous New Bag 1/26/22 2127)     VAS lower limb venous duplex study, complete bilateral   Final Result      CTA ED chest PE study   Final Result      Multiple bilateral pulmonary emboli, as described with evidence of right heart strain        Cardiomegaly, moderate to severe multiple focal and confluent areas of groundglass and alveolar opacities seen in the bilateral lungs which could represent edema such as in the setting of fluid overload/CHF and/or infectious or inflammatory pneumonitis  Depending on the clinical setting  Correlation with the patient's symptoms, and laboratory values recommended  Mild scattered pulmonary emphysematous changes  Coronary atherosclerosis, shotty mediastinal and hilar lymph nodes, and other findings as above  I personally discussed this study with Dr Paige Loredo on 1/21/2022 at 10:43 PM                Workstation performed: [id number]         XR chest 1 view portable   ED Interpretation   LEFT lower lobe PNA  Final Result      Left-sided pneumonia  Recommend follow-up to ensure complete clearing after treatment                          Workstation performed: [id number]           Orders Placed This Encounter   Procedures    ED ECG Documentation Only    POC Cardiac US    COVID/FLU/RSV    Blood culture #1    Blood culture #2    Sputum culture and Gram stain    Strep Pneumoniae, Urine    Legionella antigen, Urine    XR chest 1 view portable    CTA ED chest PE study    CBC and differential    Basic metabolic panel    HS Troponin 0hr (reflex protocol)    Magnesium    Calcium, ionized    C-reactive protein    Ferritin    NT-BNP PRO    Procalcitonin with AM Reflex    CK (with reflex to MB)    Lactic acid, plasma    Protime-INR    D-dimer, quantitative    HS Troponin I 2hr    HS Troponin I 4hr    Procalcitonin Reflex    CBC    Basic metabolic panel    Hepatic function panel    APTT    Blood gas, arterial    Magnesium    APTT    APTT    CBC and differential    Basic metabolic panel    APTT    APTT    Basic metabolic panel    Magnesium    CBC and differential    Phosphorus    APTT    CBC and differential    Magnesium    Phosphorus    Basic metabolic panel    APTT    APTT    Blood gas, arterial    CBC and differential    Magnesium    Phosphorus    Basic metabolic panel    CBC and differential    Magnesium    Phosphorus    Basic metabolic panel    CBC and differential    Magnesium    Phosphorus    Basic metabolic panel    Diet Cardiovascular; Lo Cholesterol    Nursing communication Continue IV as ordered      Notify admitting physician    Notify admitting physician on arrival    Vital Signs per unit routine    Up and OOB as tolerated    Elevate Head of Bed 30 degrees or greater    Oral care    Nursing Communication Provide patient with education on Pneumonia Zone Tool and document in the Education activity    Incentive spirometry    Incentive spirometry    Heparin: VTE/PE  Infusion Protocol Administration Instructions    Heparin: VTE/PE  Infusion Protocol Notify Physician If:    Heparin Infusion and Platelet Monitoring Per Protocol    Patient Self-Proning    Incentive spirometry    Activity as tolerated    Ambulate patient    Cardio-Pulmonary Monitoring (Critical Care & Step Down Only)    Vital Signs    Up with assistance    Turn patient    Daily weights    I/O    CAM (ICU) Assessment    Nursing dysphagia assessment    Neuro checks    Apply SCD or Foot pumps    Titrate O2 (oxygen) to keep saturation at    Level 1-Full Code: all life saving measures are indicated    Inpatient consult to Pulmonology    Consult to Case Management    Inpatient consult to Hematology    OT eval and treat    PT eval and treat    Respiratory Protocol    High Flow Nasal Cannula    EKG RESULTS    ECG 12 lead    ECG 12 lead    ECG 12 lead    ECG 12 lead    Echo follow up/limited w/ contrast if indicated    Inpatient Admission    Aspiration precautions    Fall precautions    Update level of care    Update level of care     Labs Reviewed   CBC AND DIFFERENTIAL - Abnormal       Result Value Ref Range Status    WBC 10 89 (*) 4 31 - 10 16 Thousand/uL Final    RBC 4 45  3 81 - 5 12 Million/uL Final    Hemoglobin 14 4  11 5 - 15 4 g/dL Final    Hematocrit 43 3  34 8 - 46 1 % Final    MCV 97  82 - 98 fL Final    MCH 32 4  26 8 - 34 3 pg Final    MCHC 33 3  31 4 - 37 4 g/dL Final    RDW 15 2 (*) 11 6 - 15 1 % Final    MPV 10 9  8 9 - 12 7 fL Final    Platelets 175 (*) 054 - 390 Thousands/uL Final    nRBC 0  /100 WBCs Final    Neutrophils Relative 75  43 - 75 % Final    Immat GRANS % 2  0 - 2 % Final    Lymphocytes Relative 10 (*) 14 - 44 % Final    Monocytes Relative 11  4 - 12 % Final    Eosinophils Relative 1  0 - 6 % Final    Basophils Relative 1  0 - 1 % Final    Neutrophils Absolute 8 28 (*) 1 85 - 7 62 Thousands/µL Final    Immature Grans Absolute 0 18  0 00 - 0 20 Thousand/uL Final    Lymphocytes Absolute 1 09  0 60 - 4 47 Thousands/µL Final    Monocytes Absolute 1 18  0 17 - 1 22 Thousand/µL Final    Eosinophils Absolute 0 10  0 00 - 0 61 Thousand/µL Final    Basophils Absolute 0 06  0 00 - 0 10 Thousands/µL Final   BASIC METABOLIC PANEL - Abnormal    Sodium 131 (*) 136 - 145 mmol/L Final    Potassium 3 3 (*) 3 5 - 5 3 mmol/L Final    Chloride 94 (*) 100 - 108 mmol/L Final    CO2 28  21 - 32 mmol/L Final    ANION GAP 9  4 - 13 mmol/L Final    BUN 23  5 - 25 mg/dL Final    Creatinine 1 40 (*) 0 60 - 1 30 mg/dL Final    Comment: Standardized to IDMS reference method    Glucose 96  65 - 140 mg/dL Final    Comment: If the patient is fasting, the ADA then defines impaired fasting glucose as > 100 mg/dL and diabetes as > or equal to 123 mg/dL  Specimen collection should occur prior to Sulfasalazine administration due to the potential for falsely depressed results  Specimen collection should occur prior to Sulfapyridine administration due to the potential for falsely elevated results      Calcium 9 0  8 3 - 10 1 mg/dL Final    eGFR 39  ml/min/1 73sq m Final    Narrative:     [place] guidelines for Chronic Kidney Disease (CKD):     Stage 1 with normal or high GFR (GFR > 90 mL/min/1 73 square meters)    Stage 2 Mild CKD (GFR = 60-89 mL/min/1 73 square meters)    Stage 3A Moderate CKD (GFR = 45-59 mL/min/1 73 square meters)    Stage 3B Moderate CKD (GFR = 30-44 mL/min/1 73 square meters)    Stage 4 Severe CKD (GFR = 15-29 mL/min/1 73 square meters)    Stage 5 End Stage CKD (GFR <15 mL/min/1 73 square meters)  Note: GFR calculation is accurate only with a steady state creatinine   MAGNESIUM - Abnormal    Magnesium 0 9 (*) 1 6 - 2 6 mg/dL Final    Narrative:     Verified by repeat analysis   CALCIUM, IONIZED - Abnormal    Calcium, Ionized 1 00 (*) 1 12 - 1 32 mmol/L Final   C-REACTIVE PROTEIN - Abnormal     0 (*) <3 0 mg/L Final   NT-BNP PRO (BRAIN NATRIURETIC PEPTIDE) - Abnormal    NT-proBNP 9,960 (*) <125 pg/mL Final   PROTIME-INR - Abnormal    Protime 28 8 (*) 11 6 - 14 5 seconds Final    INR 2 89 (*) 0 84 - 1 19 Final   D-DIMER, QUANTITATIVE - Abnormal    D-Dimer, Quant 3 45 (*) <0 50 ug/ml FEU Final    Comment: Reference and upper limits to exclude DVT and PE are the same  Do not use to exclude if clinical symptoms are present  Pregnant women:  1st trimester:  <0 22 - 1 06 ug/ml FEU  2nd trimester:  <0 22 - 1 88 ug/ml FEU  3rd trimester:   0 24 - 3 28 ug/ml FEU    Note: Normal ranges may not apply to patients who are transgender, non-binary, or whose legal sex, sex at birth, and gender identity differ  HEPATIC FUNCTION PANEL - Abnormal    Total Bilirubin 0 76  0 20 - 1 00 mg/dL Final    Comment: Use of this assay is not recommended for patients undergoing treatment with eltrombopag due to the potential for falsely elevated results  Bilirubin, Direct 0 27 (*) 0 00 - 0 20 mg/dL Final    Alkaline Phosphatase 71  46 - 116 U/L Final    AST 34  5 - 45 U/L Final    Comment: Specimen collection should occur prior to Sulfasalazine and/or Sulfapyridine administration due to the potential for falsely depressed results       ALT 13  12 - 78 U/L Final    Comment: Specimen collection should occur prior to Sulfasalazine and/or Sulfapyridine administration due to the potential for falsely depressed results  Total Protein 7 6  6 4 - 8 2 g/dL Final    Albumin 2 6 (*) 3 5 - 5 0 g/dL Final   APTT - Abnormal    PTT >210 (*) 23 - 37 seconds Final   APTT - Abnormal     (*) 23 - 37 seconds Final   CBC AND PLATELET - Abnormal    WBC 9 09  4 31 - 10 16 Thousand/uL Final    RBC 4 19  3 81 - 5 12 Million/uL Final    Hemoglobin 13 5  11 5 - 15 4 g/dL Final    Hematocrit 40 0  34 8 - 46 1 % Final    MCV 96  82 - 98 fL Final    MCH 32 2  26 8 - 34 3 pg Final    MCHC 33 8  31 4 - 37 4 g/dL Final    RDW 15 0  11 6 - 15 1 % Final    Platelets 485 (*) 556 - 390 Thousands/uL Final    MPV 11 3  8 9 - 12 7 fL Final   BASIC METABOLIC PANEL - Abnormal    Sodium 130 (*) 136 - 145 mmol/L Final    Potassium 4 6  3 5 - 5 3 mmol/L Final    Chloride 94 (*) 100 - 108 mmol/L Final    CO2 29  21 - 32 mmol/L Final    ANION GAP 7  4 - 13 mmol/L Final    BUN 29 (*) 5 - 25 mg/dL Final    Creatinine 1 38 (*) 0 60 - 1 30 mg/dL Final    Comment: Standardized to IDMS reference method    Glucose 136  65 - 140 mg/dL Final    Comment: If the patient is fasting, the ADA then defines impaired fasting glucose as > 100 mg/dL and diabetes as > or equal to 123 mg/dL  Specimen collection should occur prior to Sulfasalazine administration due to the potential for falsely depressed results  Specimen collection should occur prior to Sulfapyridine administration due to the potential for falsely elevated results      Calcium 9 3  8 3 - 10 1 mg/dL Final    eGFR 39  ml/min/1 73sq m Final    Narrative:     [place] guidelines for Chronic Kidney Disease (CKD):     Stage 1 with normal or high GFR (GFR > 90 mL/min/1 73 square meters)    Stage 2 Mild CKD (GFR = 60-89 mL/min/1 73 square meters)    Stage 3A Moderate CKD (GFR = 45-59 mL/min/1 73 square meters)    Stage 3B Moderate CKD (GFR = 30-44 mL/min/1 73 square meters)    Stage 4 Severe CKD (GFR = 15-29 mL/min/1 73 square meters)    Stage 5 End Stage CKD (GFR <15 mL/min/1 73 square meters)  Note: GFR calculation is accurate only with a steady state creatinine   APTT - Abnormal    PTT 64 (*) 23 - 37 seconds Final    Comment: Therapeutic Heparin Range =  60-90 seconds   COVID19, INFLUENZA A/B, RSV PCR, [facility] - Normal    SARS-CoV-2 Negative  Negative Final    Comment:      INFLUENZA A PCR Negative  Negative Final    Comment:      INFLUENZA B PCR Negative  Negative Final    Comment:      RSV PCR Negative  Negative Final    Comment:      Narrative:     FOR PEDIATRIC PATIENTS - copy/paste COVID Guidelines URL to browser: [email or web address]  [email or web address]    SARS-CoV-2 assay is a Nucleic Acid Amplification assay intended for the  qualitative detection of nucleic acid from SARS-CoV-2 in nasopharyngeal  swabs  Results are for the presumptive identification of SARS-CoV-2 RNA  Positive results are indicative of infection with SARS-CoV-2, the virus  causing COVID-19, but do not rule out bacterial infection or co-infection  with other viruses  Laboratories within the [place] and its  territories are required to report all positive results to the appropriate  public health authorities  Negative results do not preclude SARS-CoV-2  infection and should not be used as the sole basis for treatment or other  patient management decisions  Negative results must be combined with  clinical observations, patient history, and epidemiological information  This test has not been FDA cleared or approved  This test has been authorized by FDA under an Emergency Use Authorization  (EUA)  This test is only authorized for the duration of time the  declaration that circumstances exist justifying the authorization of the  emergency use of an in vitro diagnostic tests for detection of SARS-CoV-2  virus and/or diagnosis of COVID-19 infection under section 564(b)(1) of  the Act, 21 U  S C  243CZW-8(L)(2), unless the authorization is terminated  or revoked sooner  The test has been validated but independent review by FDA  and CLIA is pending  Test performed using [employer] GeneXpert: This RT-PCR assay targets N2,  a region unique to SARS-CoV-2  A conserved region in the E-gene was chosen  for pan-Sarbecovirus detection which includes SARS-CoV-2  STREP PNEUMONIAE ANTIGEN,URINE - Normal    Strep pneumoniae antigen, urine Negative  Negative Final   LEGIONELLA ANTIGEN, URINE - Normal    Legionella Urinary Antigen Negative  Negative Final   FERRITIN - Normal    Ferritin 121  8 - 388 ng/mL Final   PROCALCITONIN TEST - Normal    Procalcitonin 0 19  <=0 25 ng/ml Final    Comment: Suspected Lower Respiratory Tract Infection (LRTI):  - LESS than or EQUAL to 0 25 ng/mL:   low likelihood for bacterial LRTI; antibiotics DISCOURAGED   - GREATER than 0 25 ng/mL:   increased likelihood for bacterial LRTI; antibiotics ENCOURAGED  Suspected Sepsis:  - Strongly consider initiating antibiotics in ALL UNSTABLE patients  - LESS than or EQUAL to 0 5 ng/mL:   low likelihood for bacterial sepsis; antibiotics DISCOURAGED   - GREATER than 0 5 ng/mL:   increased likelihood for bacterial sepsis; antibiotics ENCOURAGED   - GREATER than 2 ng/mL:   high risk for severe sepsis / septic shock; antibiotics strongly ENCOURAGED  Decisions on antibiotic use should not be based solely on Procalcitonin (PCT) levels  If PCT is low but uncertainty exists with stopping antibiotics, repeat PCT in 6-24 hours to confirm the low level  If antibiotics are administered (regardless if initial PCT was high or low), repeat PCT every 1-2 days to consider early antibiotic cessation (when GREATER than 80% decrease from the peak OR when PCT drops below designated cutoffs, whichever comes first), so long as the infection is NOT one that typically requires prolonged treatment durations (e g , bone/joint infections, endocarditis, Staph  aureus bacteremia)      Situations of FALSE-POSITIVE Procalcitonin values:  1) Newborns < 67 hours old  2) Massive stress from severe trauma / burns, major surgery, acute pancreatitis, cardiogenic / hemorrhagic shock, sickle cell crisis, or other organ perfusion abnormalities  3) Malaria and some Candidal infections  4) Treatment with agents that stimulate cytokines (e g , OKT3, anti-lymphocyte globulins, alemtuzumab, IL-2, granulocyte transfusion [NOT GCSFs])  5) Chronic renal disease causes elevated baseline levels (consider GREATER than 0 75 ng/mL as an abnormal cut-off); initiating HD/CRRT may cause transient decreases  6) Paraneoplastic syndromes from medullary thyroid or SCLC, some forms of vasculitis, and acute gvnbu-qu-yugk disease    Situations of FALSE-NEGATIVE Procalcitonin values:  1) Too early in clinical course for PCT to have reached its peak (may repeat in 6-24 hours to confirm low level)  2) Localized infection WITHOUT systemic (SIRS / sepsis) response (e g , an abscess, osteomyelitis, cystitis)  3) Mycobacteria (e g , Tuberculosis, MAC)  4) Cystic fibrosis exacerbations     CK - Normal    Total CK 55  26 - 192 U/L Final   LACTIC ACID, PLASMA - Normal    LACTIC ACID 2 0  0 5 - 2 0 mmol/L Final    Narrative:     Result may be elevated if tourniquet was used during collection  PROCALCITONIN REFLEX - Normal    Procalcitonin 0 21  <=0 25 ng/ml Final    Comment: Suspected Lower Respiratory Tract Infection (LRTI):  - LESS than or EQUAL to 0 25 ng/mL:   low likelihood for bacterial LRTI; antibiotics DISCOURAGED   - GREATER than 0 25 ng/mL:   increased likelihood for bacterial LRTI; antibiotics ENCOURAGED  Suspected Sepsis:  - Strongly consider initiating antibiotics in ALL UNSTABLE patients    - LESS than or EQUAL to 0 5 ng/mL:   low likelihood for bacterial sepsis; antibiotics DISCOURAGED   - GREATER than 0 5 ng/mL:   increased likelihood for bacterial sepsis; antibiotics ENCOURAGED   - GREATER than 2 ng/mL:   high risk for severe sepsis / septic shock; antibiotics strongly ENCOURAGED  Decisions on antibiotic use should not be based solely on Procalcitonin (PCT) levels  If PCT is low but uncertainty exists with stopping antibiotics, repeat PCT in 6-24 hours to confirm the low level  If antibiotics are administered (regardless if initial PCT was high or low), repeat PCT every 1-2 days to consider early antibiotic cessation (when GREATER than 80% decrease from the peak OR when PCT drops below designated cutoffs, whichever comes first), so long as the infection is NOT one that typically requires prolonged treatment durations (e g , bone/joint infections, endocarditis, Staph  aureus bacteremia)      Situations of FALSE-POSITIVE Procalcitonin values:  1) Newborns < 67 hours old  2) Massive stress from severe trauma / burns, major surgery, acute pancreatitis, cardiogenic / hemorrhagic shock, sickle cell crisis, or other organ perfusion abnormalities  3) Malaria and some Candidal infections  4) Treatment with agents that stimulate cytokines (e g , OKT3, anti-lymphocyte globulins, alemtuzumab, IL-2, granulocyte transfusion [NOT GCSFs])  5) Chronic renal disease causes elevated baseline levels (consider GREATER than 0 75 ng/mL as an abnormal cut-off); initiating HD/CRRT may cause transient decreases  6) Paraneoplastic syndromes from medullary thyroid or SCLC, some forms of vasculitis, and acute [id number] disease    Situations of FALSE-NEGATIVE Procalcitonin values:  1) Too early in clinical course for PCT to have reached its peak (may repeat in 6-24 hours to confirm low level)  2) Localized infection WITHOUT systemic (SIRS / sepsis) response (e g , an abscess, osteomyelitis, cystitis)  3) Mycobacteria (e g , Tuberculosis, MAC)  4) Cystic fibrosis exacerbations     MAGNESIUM - Normal    Magnesium 1 7  1 6 - 2 6 mg/dL Final   HS TROPONIN I 0HR    hs TnI 0hr 80  "Refer to ACS Flowchart"- see link ng/L Final    Comment:                                              Initial (time 0) result  If >=50 ng/L, Myocardial injury suggested ;  Type of myocardial injury and treatment strategy  to be determined  If 5-49 ng/L, a delta result at 2 hours and or 4 hours will be needed to further evaluate  If <4 ng/L, and chest pain has been >3 hours since onset, patient may qualify for discharge based on the HEART score in the ED  If <5 ng/L and <3hours since onset of chest pain, a delta result at 2 hours will be needed to further evaluate  Second Troponin (time 2 hours)  If calculated delta >= 20 ng/L,  Myocardial injury suggested ; Type of myocardial injury and treatment strategy to be determined  If 5-49 ng/L and the calculated delta is 5-19 ng/L, consult medical service for evaluation  Continue evaluation for ischemia on ecg and other possible etiology and repeat hs troponin at 4 hours  If delta is <5 ng/L at 2 hours, consider discharge based on risk stratification via the HEART score (if in ED), or LUIS F risk score in IP/Observation  HS TROPONIN I 2HR    hs TnI 2hr 99  "Refer to ACS Flowchart"- see link ng/L Final    Comment:                                              Initial (time 0) result  If >=50 ng/L, Myocardial injury suggested ;  Type of myocardial injury and treatment strategy  to be determined  If 5-49 ng/L, a delta result at 2 hours and or 4 hours will be needed to further evaluate  If <4 ng/L, and chest pain has been >3 hours since onset, patient may qualify for discharge based on the HEART score in the ED  If <5 ng/L and <3hours since onset of chest pain, a delta result at 2 hours will be needed to further evaluate  Second Troponin (time 2 hours)  If calculated delta >= 20 ng/L,  Myocardial injury suggested ; Type of myocardial injury and treatment strategy to be determined  If 5-49 ng/L and the calculated delta is 5-19 ng/L, consult medical service for evaluation  Continue evaluation for ischemia on ecg and other possible etiology and repeat hs troponin at 4 hours    If delta is <5 ng/L at 2 hours, consider discharge based on risk stratification via the HEART score (if in ED), or LUIS F risk score in IP/Observation  Delta 2hr hsTnI 19  ng/L Final   HS TROPONIN I 4HR    hs TnI 4hr 81  "Refer to ACS Flowchart"- see link ng/L Final    Comment:                                              Initial (time 0) result  If >=50 ng/L, Myocardial injury suggested ;  Type of myocardial injury and treatment strategy  to be determined  If 5-49 ng/L, a delta result at 2 hours and or 4 hours will be needed to further evaluate  If <4 ng/L, and chest pain has been >3 hours since onset, patient may qualify for discharge based on the HEART score in the ED  If <5 ng/L and <3hours since onset of chest pain, a delta result at 2 hours will be needed to further evaluate  Second Troponin (time 2 hours)  If calculated delta >= 20 ng/L,  Myocardial injury suggested ; Type of myocardial injury and treatment strategy to be determined  If 5-49 ng/L and the calculated delta is 5-19 ng/L, consult medical service for evaluation  Continue evaluation for ischemia on ecg and other possible etiology and repeat hs troponin at 4 hours  If delta is <5 ng/L at 2 hours, consider discharge based on risk stratification via the HEART score (if in ED), or LUIS F risk score in IP/Observation      Delta 4hr hsTnI 1  ng/L Final   BLOOD GAS, ARTERIAL    pH, Arterial 7 408  7 350 - 7 450 Final    pCO2, Arterial 42 8  36 0 - 44 0 mm Hg Final    pO2, Arterial 82 8  75 0 - 129 0 mm Hg Final    HCO3, Arterial 26 4  22 0 - 28 0 mmol/L Final    Base Excess, Arterial 1 5  mmol/L Final    O2 Content, Arterial 18 8  16 0 - 23 0 mL/dL Final    O2 HGB,Arterial  94 6  94 0 - 97 0 % Final    SOURCE Radial, Right   Final    Non Vent Type- HFNC HFNC Flow   Final    NV HFNC FIO2 100   Final    HFNC FLOW LPM 55   Final     Time reflects when diagnosis was documented in both MDM as applicable and the Disposition within this note       Time User Action Codes Description Comment    1/21/2022  3:58 PM Teo Monterroso Add [R09 02] Hypoxia     1/21/2022  3:58 PM Palladino, Helga [place] Add [R06 02] SOB (shortness of breath)     1/21/2022  3:59 PM Teo Monterroso Add [J18 9] Pneumonia     1/21/2022  4:04 PM Palladino, Helga [place] Add [I50 9] CHF (congestive heart failure) ([facility] )     1/23/2022  9:03 AM Zaina Knight Add [D68 52] Prothrombin mutation [facility])           ED Disposition       ED Disposition Condition Date/Time Comment    Admit Stable Fri Jan 21, 2022  4:04 PM Case was discussed with dr Dover and the patient's admission status was agreed to be inpatient to the service of Dr Michela Acevedo  Follow-up Information    None       Current Discharge Medication List        CONTINUE these medications which have NOT CHANGED    Details   albuterol (2 5 mg/3 mL) 0 083 % nebulizer solution Take 3 mL (2 5 mg total) by nebulization every 4 (four) hours as needed for wheezing or shortness of breath  Qty: 3 mL, Refills: 0    Associated Diagnoses: Bronchitis      apixaban (ELIQUIS) 5 mg Take 1 tablet (5 mg total) by mouth 2 (two) times a day  Qty: 60 tablet, Refills: 3    Associated Diagnoses: Cerebrovascular accident (CVA) due to embolism of left middle cerebral artery ([facility] ); Prothrombin mutation ([facility] );  Hypercoagulable state ([facility] )      aspirin (ECOTRIN LOW STRENGTH) 81 mg EC tablet Take 1 tablet (81 mg total) by mouth daily  Qty: 60 tablet, Refills: 0    Associated Diagnoses: Prothrombin mutation (HCC)      atenolol (TENORMIN) 25 mg tablet Take 1 tablet (25 mg total) by mouth daily  Qty: 180 tablet, Refills: 1    Associated Diagnoses: Secondary hypertension      atorvastatin (LIPITOR) 40 mg tablet Take 1 tablet (40 mg total) by mouth every evening  Qty: 60 tablet, Refills: 0    Associated Diagnoses: Cerebrovascular accident (CVA) due to embolism of left middle cerebral artery (HCC)      cyclobenzaprine (FLEXERIL) 5 mg tablet Take 1 tablet (5 mg total) by mouth 3 (three) times a day as needed for muscle spasms  Qty: 20 tablet, Refills: 0    Associated Diagnoses: Back spasm      FLUoxetine (PROzac) 10 mg capsule Take 3 capsules (30 mg total) by mouth daily  Qty: 120 capsule, Refills: 0    Associated Diagnoses: Cerebrovascular accident (CVA) due to embolism of left middle cerebral artery (HCC)      gabapentin (NEURONTIN) 300 mg capsule Take 300 mg by mouth 3 (three) times a day      nicotine (NICODERM CQ) 14 mg/24hr TD 24 hr patch Place 1 patch on the skin daily  Qty: 28 patch, Refills: 0    Associated Diagnoses: Smoking      pantoprazole (PROTONIX) 20 mg tablet TAKE 1 TABLET BY MOUTH EVERY DAY  Qty: 90 tablet, Refills: 1    Associated Diagnoses: Dyspepsia           No discharge procedures on file  Prior to Admission Medications   Prescriptions Last Dose Informant Patient Reported? Taking?    FLUoxetine (PROzac) 10 mg capsule   No No   Sig: Take 3 capsules (30 mg total) by mouth daily   albuterol (2 5 mg/3 mL) 0 083 % nebulizer solution   No No   Sig: Take 3 mL (2 5 mg total) by nebulization every 4 (four) hours as needed for wheezing or shortness of breath   Patient not taking: Reported on 11/26/2021    apixaban (ELIQUIS) 5 mg   No No   Sig: Take 2 tablets (10 mg total) by mouth 2 (two) times a day for 12 doses   apixaban (ELIQUIS) 5 mg   No No   Sig: Take 1 tablet (5 mg total) by mouth 2 (two) times a day   aspirin (ECOTRIN LOW STRENGTH) 81 mg EC tablet   No No   Sig: Take 1 tablet (81 mg total) by mouth daily   atenolol (TENORMIN) 25 mg tablet   No No   Sig: Take 1 tablet (25 mg total) by mouth daily   atorvastatin (LIPITOR) 40 mg tablet   No No   Sig: Take 1 tablet (40 mg total) by mouth every evening   cyclobenzaprine (FLEXERIL) 5 mg tablet   No No   Sig: Take 1 tablet (5 mg total) by mouth 3 (three) times a day as needed for muscle spasms   gabapentin (NEURONTIN) 300 mg capsule   Yes No   Sig: Take 300 mg by mouth 3 (three) times a day   nicotine (NICODERM CQ) 14 mg/24hr TD 24 hr patch   No No   Sig: Place 1 patch on the skin daily   Patient not taking: Reported on 11/26/2021    pantoprazole (PROTONIX) 20 mg tablet   No No   Sig: TAKE 1 TABLET BY MOUTH EVERY DAY      Facility-Administered Medications: None       Portions of the record may have been created with voice recognition software  Occasional wrong word or "sound a like" substitutions may have occurred due to the inherent limitations of voice recognition software  Read the chart carefully and recognize, using context, where substitutions have occurred      Electronically signed by:  Hai Stringer

## 2022-01-21 NOTE — ED PROVIDER NOTES
History  Chief Complaint   Patient presents with    Shortness of Breath     family all had covid on 1/10, pt had symptoms but was not tested  never got better, remains with SOB     Patient is a 30-year-old female with past medical history significant for hypercoagulable state due to mutation in the prothrombin, prior pulmonary embolisms and strokes on Eliquis  Per patient she states that for the past couple of weeks she has had progressively worsening shortness of breath  She states that her family all had COVID on 01/10 but she did not get tested  Patient states she had symptoms before this state it started with upper respiratory like symptoms progressed to shortness of breath  Patient endorses trying to get in contact with her doctor but they would not see her until she was COVID tested  Patient states that she acutely got more short of breath this afternoon  She endorses subjective fevers, chills, productive cough  She denies chest pain, nausea, vomiting, diarrhea, constipation, abdominal pain, numbness or tingling in any of her extremities, hemoptysis, leg tenderness, leg swelling  Prior to Admission Medications   Prescriptions Last Dose Informant Patient Reported? Taking?    FLUoxetine (PROzac) 10 mg capsule   No No   Sig: Take 3 capsules (30 mg total) by mouth daily   albuterol (2 5 mg/3 mL) 0 083 % nebulizer solution   No No   Sig: Take 3 mL (2 5 mg total) by nebulization every 4 (four) hours as needed for wheezing or shortness of breath   Patient not taking: Reported on 11/26/2021    apixaban (ELIQUIS) 5 mg   No No   Sig: Take 2 tablets (10 mg total) by mouth 2 (two) times a day for 12 doses   apixaban (ELIQUIS) 5 mg   No No   Sig: Take 1 tablet (5 mg total) by mouth 2 (two) times a day   aspirin (ECOTRIN LOW STRENGTH) 81 mg EC tablet   No No   Sig: Take 1 tablet (81 mg total) by mouth daily   atenolol (TENORMIN) 25 mg tablet   No No   Sig: Take 1 tablet (25 mg total) by mouth daily atorvastatin (LIPITOR) 40 mg tablet   No No   Sig: Take 1 tablet (40 mg total) by mouth every evening   cyclobenzaprine (FLEXERIL) 5 mg tablet   No No   Sig: Take 1 tablet (5 mg total) by mouth 3 (three) times a day as needed for muscle spasms   gabapentin (NEURONTIN) 300 mg capsule   Yes No   Sig: Take 300 mg by mouth 3 (three) times a day   nicotine (NICODERM CQ) 14 mg/24hr TD 24 hr patch   No No   Sig: Place 1 patch on the skin daily   Patient not taking: Reported on 11/26/2021    pantoprazole (PROTONIX) 20 mg tablet   No No   Sig: TAKE 1 TABLET BY MOUTH EVERY DAY      Facility-Administered Medications: None       Past Medical History:   Diagnosis Date    Acid reflux     Anxiety     Arthritis     Back pain     Carpal tunnel syndrome, bilateral     Depression     Endometrial cancer ([facility] [address] ) 1997    Hiatal hernia     History of DVT of lower extremity     Right leg-had IVc filter  removal today-6/6/2016    History of pulmonary embolism     History of uterine cancer     Was stage 3- uterine, ovaries-hysterectomy    Hypercholesteremia     Hypertension     Left leg pain     Lumbar herniated disc     Tumor cells, benign     In right atrium  Probably congenital     Varicose vein of leg     Wears glasses        Past Surgical History:   Procedure Laterality Date    COLONOSCOPY      DENTAL SURGERY      DILATION AND CURETTAGE OF UTERUS      ESOPHAGOGASTRODUODENOSCOPY      FOOT SURGERY      LAST ASSESSED: [id number]    HYSTERECTOMY  1997    uterine cancer-hx    IR STROKE ALERT  10/20/2021    JOINT REPLACEMENT      b/l   THR    OOPHORECTOMY Bilateral 1997    CO COLONOSCOPY FLX DX W/COLLJ SPEC WHEN PFRMD N/A 6/14/2017    Procedure: EGD AND COLONOSCOPY;  Surgeon: Harrold Buerger, MD;  Location: [facility];   Service: Gastroenterology    CO TOTAL HIP ARTHROPLASTY Left 4/13/2016    Procedure: ARTHROPLASTY HIP TOTAL;  Surgeon: Socrates Raphael DO;  Location: [facility];  Service: Orthopedics    TONSILLECTOMY AND ADENOIDECTOMY      TOTAL HIP ARTHROPLASTY Right     LAST ASSESSED: [id number]       Family History   Problem Relation Age of Onset    Heart failure Father     Colon cancer Father 62    Arthritis Family      I have reviewed and agree with the history as documented  E-Cigarette/Vaping    E-Cigarette Use Never User      E-Cigarette/Vaping Substances     Social History     Tobacco Use    Smoking status: Former Smoker     Packs/day: 0 50     Years: 50 00     Pack years: 25 00     Types: Cigarettes    Smokeless tobacco: Never Used   Vaping Use    Vaping Use: Never used   Substance Use Topics    Alcohol use: Yes     Comment: 2 or 3 monthly    Drug use: No        Review of Systems   Constitutional: Positive for activity change, appetite change, chills and fatigue  Negative for fever  HENT: Negative for congestion, ear pain and sore throat  Eyes: Negative for pain and visual disturbance  Respiratory: Positive for cough and shortness of breath  Negative for chest tightness and wheezing  Cardiovascular: Negative for chest pain and palpitations  Gastrointestinal: Negative for abdominal pain, constipation, diarrhea, nausea and vomiting  Genitourinary: Negative for dysuria and hematuria  Musculoskeletal: Negative for arthralgias and back pain  Skin: Negative for color change and rash  Neurological: Negative for dizziness, seizures, syncope, weakness, light-headedness, numbness and headaches  Psychiatric/Behavioral: Negative for agitation and behavioral problems  All other systems reviewed and are negative        Physical Exam  ED Triage Vitals   Temperature Pulse Respirations Blood Pressure SpO2   01/21/22 1329 01/21/22 1329 01/21/22 1329 01/21/22 1329 01/21/22 1329   98 6 °F (37 °C) 89 (!) 26 (!) 84/70 96 %      Temp Source Heart Rate Source Patient Position - Orthostatic VS BP Location FiO2 (%)   01/21/22 1329 01/21/22 1329 01/21/22 1329 01/21/22 1329 01/21/22 1954   Tympanic Monitor Sitting Left arm 65      Pain Score       01/21/22 1607       No Pain             Orthostatic Vital Signs  Vitals:    01/21/22 1845 01/21/22 2200 01/22/22 0500 01/22/22 0730   BP:  94/62 110/70 123/83   Pulse: 100 84 78 84   Patient Position - Orthostatic VS:   Lying Lying       Physical Exam  Vitals and nursing note reviewed  Constitutional:       General: She is in acute distress  Appearance: She is well-developed  She is ill-appearing  HENT:      Head: Normocephalic and atraumatic  Eyes:      Extraocular Movements: Extraocular movements intact  Conjunctiva/sclera: Conjunctivae normal       Pupils: Pupils are equal, round, and reactive to light  Cardiovascular:      Rate and Rhythm: Normal rate and regular rhythm  Heart sounds: No murmur heard  Pulmonary:      Effort: Accessory muscle usage present  No respiratory distress  Breath sounds: Examination of the left-middle field reveals decreased breath sounds  Examination of the left-lower field reveals decreased breath sounds  Decreased breath sounds and rhonchi present  Abdominal:      Palpations: Abdomen is soft  Tenderness: There is no abdominal tenderness  Musculoskeletal:         General: Normal range of motion  Cervical back: Normal range of motion and neck supple  Right lower leg: No tenderness  No edema  Left lower leg: No tenderness  No edema  Skin:     General: Skin is warm and dry  Capillary Refill: Capillary refill takes 2 to 3 seconds  Neurological:      General: No focal deficit present  Mental Status: She is alert and oriented to person, place, and time     Psychiatric:         Mood and Affect: Mood normal          Behavior: Behavior normal          ED Medications  Medications   albuterol inhalation solution 2 5 mg (has no administration in time range)   aspirin (ECOTRIN LOW STRENGTH) EC tablet 81 mg (has no administration in time range)   atenolol (TENORMIN) tablet 25 mg (has no administration in time range)   atorvastatin (LIPITOR) tablet 40 mg (40 mg Oral Given 1/21/22 2205)   FLUoxetine (PROzac) capsule 30 mg (has no administration in time range)   gabapentin (NEURONTIN) capsule 300 mg (300 mg Oral Given 1/21/22 2206)   pantoprazole (PROTONIX) EC tablet 20 mg (has no administration in time range)   traZODone (DESYREL) tablet 25 mg (0 mg Oral Hold 1/21/22 2122)   ondansetron (ZOFRAN) injection 4 mg (has no administration in time range)   benzonatate (TESSALON PERLES) capsule 100 mg (has no administration in time range)   acetaminophen (TYLENOL) tablet 650 mg (has no administration in time range)   heparin (porcine) 25,000 units in 0 45% NaCl 250 mL infusion (premix) (12 Units/kg/hr × 90 kg (Order-Specific) Intravenous Restarted 1/22/22 0840)   heparin (porcine) injection 7,200 Units (has no administration in time range)   heparin (porcine) injection 3,600 Units (has no administration in time range)   dexamethasone (DECADRON) injection 9 08 mg (has no administration in time range)   Baricitinib (OLUMIANT) (COVID EUA) tablet 4 mg (has no administration in time range)   dornase madiha (PULMOZYME) inhalation solution 2 5 mg (has no administration in time range)   dexamethasone (DECADRON) injection 8 mg (8 mg Intravenous Given 1/21/22 1435)   multi-electrolyte (ISOLYTE-S PH 7 4) bolus 1,000 mL (0 mL Intravenous Stopped 1/21/22 1552)   levofloxacin (LEVAQUIN) IVPB (premix in dextrose) 750 mg 150 mL (0 mg Intravenous Stopped 1/21/22 1723)   magnesium sulfate 2 g/50 mL IVPB (premix) 2 g (0 g Intravenous Stopped 1/21/22 1907)   potassium chloride (K-DUR,KLOR-CON) CR tablet 40 mEq (40 mEq Oral Given 1/21/22 1656)   furosemide (LASIX) injection 40 mg (40 mg Intravenous Given 1/21/22 1855)   iohexol (OMNIPAQUE) 350 MG/ML injection (SINGLE-DOSE) 85 mL (85 mL Intravenous Given 1/21/22 2146)   heparin (porcine) injection 7,200 Units (7,200 Units Intravenous Given 1/21/22 5448)       Diagnostic Studies  Results Reviewed     Procedure Component Value Units Date/Time    APTT [[id number]]     Lab Status: No result Specimen: Blood     Magnesium [[id number]]     Lab Status: No result Specimen: Blood     Blood gas, arterial [[id number]]     Lab Status: No result Specimen: Blood, Arterial     CBC [[id number]]  (Abnormal) Collected: 01/22/22 0524    Lab Status: Final result Specimen: Blood from Arm, Right Updated: 01/22/22 0726     WBC 9 09 Thousand/uL      RBC 4 19 Million/uL      Hemoglobin 13 5 g/dL      Hematocrit 40 0 %      MCV 96 fL      MCH 32 2 pg      MCHC 33 8 g/dL      RDW 15 0 %      Platelets 298 Thousands/uL      MPV 11 3 fL     APTT [[id number]]  (Abnormal) Collected: 01/22/22 0524    Lab Status: Final result Specimen: Blood from Arm, Right Updated: 01/22/22 0718      seconds     Procalcitonin Reflex [[id number]]  (Normal) Collected: 01/22/22 0524    Lab Status: Final result Specimen: Blood from Arm, Right Updated: 01/22/22 0616     Procalcitonin 0 21 ng/ml     Basic metabolic panel [[id number]]  (Abnormal) Collected: 01/22/22 0524    Lab Status: Final result Specimen: Blood from Arm, Right Updated: 01/22/22 0600     Sodium 130 mmol/L      Potassium 4 6 mmol/L      Chloride 94 mmol/L      CO2 29 mmol/L      ANION GAP 7 mmol/L      BUN 29 mg/dL      Creatinine 1 38 mg/dL      Glucose 136 mg/dL      Calcium 9 3 mg/dL      eGFR 39 ml/min/1 73sq m     Narrative:      [place] guidelines for Chronic Kidney Disease (CKD):     Stage 1 with normal or high GFR (GFR > 90 mL/min/1 73 square meters)    Stage 2 Mild CKD (GFR = 60-89 mL/min/1 73 square meters)    Stage 3A Moderate CKD (GFR = 45-59 mL/min/1 73 square meters)    Stage 3B Moderate CKD (GFR = 30-44 mL/min/1 73 square meters)    Stage 4 Severe CKD (GFR = 15-29 mL/min/1 73 square meters)    Stage 5 End Stage CKD (GFR <15 mL/min/1 73 square meters)  Note: GFR calculation is accurate only with a steady state creatinine APTT [[id number]]  (Abnormal) Collected: 01/21/22 2345    Lab Status: Final result Specimen: Blood from Arm, Right Updated: 01/22/22 0103     PTT >210 seconds     Sputum culture and Gram stain [[id number]] Collected: 01/21/22 2342    Lab Status: In process Specimen: Expectorated Sputum Updated: 01/21/22 2352    Strep Pneumoniae, Urine [[id number]] Collected: 01/21/22 2209    Lab Status: In process Specimen: Urine, Other Updated: 01/21/22 2213    Legionella antigen, Urine [[id number]] Collected: 01/21/22 2209    Lab Status: In process Specimen: Urine, Other Updated: 01/21/22 2213    HS Troponin I 4hr [[id number]] Collected: 01/21/22 1944    Lab Status: Final result Specimen: Blood from Arm, Left Updated: 01/21/22 2048     hs TnI 4hr 81 ng/L      Delta 4hr hsTnI 1 ng/L     Hepatic function panel [[id number]]  (Abnormal) Collected: 01/21/22 1415    Lab Status: Final result Specimen: Blood from Arm, Left Updated: 01/21/22 1859     Total Bilirubin 0 76 mg/dL      Bilirubin, Direct 0 27 mg/dL      Alkaline Phosphatase 71 U/L      AST 34 U/L      ALT 13 U/L      Total Protein 7 6 g/dL      Albumin 2 6 g/dL     Blood culture #1 [[id number]] Collected: 01/21/22 1415    Lab Status: Preliminary result Specimen: Blood from Arm, Left Updated: 01/21/22 1801     Blood Culture Received in Microbiology Lab  Culture in Progress  Blood culture #2 [[id number]] Collected: 01/21/22 1415    Lab Status: Preliminary result Specimen: Blood from Arm, Right Updated: 01/21/22 1801     Blood Culture Received in Microbiology Lab  Culture in Progress      D-dimer, quantitative [[id number]]  (Abnormal) Collected: 01/21/22 1552    Lab Status: Final result Specimen: Blood from Arm, Left Updated: 01/21/22 1641     D-Dimer, Quant 3 45 ug/ml FEU     HS Troponin I 2hr [[id number]] Collected: 01/21/22 1552    Lab Status: Final result Specimen: Blood from Arm, Left Updated: 01/21/22 1638     hs TnI 2hr 99 ng/L      Delta 2hr hsTnI 19 ng/L     Protime-INR [[id number]]  (Abnormal) Collected: 01/21/22 1552    Lab Status: Final result Specimen: Blood from Arm, Left Updated: 01/21/22 1628     Protime 28 8 seconds      INR 2 89    Magnesium [[id number]]  (Abnormal) Collected: 01/21/22 1415    Lab Status: Final result Specimen: Blood from Arm, Left Updated: 01/21/22 1620     Magnesium 0 9 mg/dL     Narrative:      Verified by repeat analysis    Procalcitonin with AM Reflex [[id number]]  (Normal) Collected: 01/21/22 1415    Lab Status: Final result Specimen: Blood from Arm, Left Updated: 01/21/22 1525     Procalcitonin 0 19 ng/ml     HS Troponin 0hr (reflex protocol) [[id number]] Collected: 01/21/22 1415    Lab Status: Final result Specimen: Blood from Arm, Left Updated: 01/21/22 1522     hs TnI 0hr 80 ng/L     NT-BNP PRO [[id number]]  (Abnormal) Collected: 01/21/22 1415    Lab Status: Final result Specimen: Blood from Arm, Left Updated: 01/21/22 1521     NT-proBNP 9,960 pg/mL     Basic metabolic panel [[id number]]  (Abnormal) Collected: 01/21/22 1415    Lab Status: Final result Specimen: Blood from Arm, Left Updated: 01/21/22 1521     Sodium 131 mmol/L      Potassium 3 3 mmol/L      Chloride 94 mmol/L      CO2 28 mmol/L      ANION GAP 9 mmol/L      BUN 23 mg/dL      Creatinine 1 40 mg/dL      Glucose 96 mg/dL      Calcium 9 0 mg/dL      eGFR 39 ml/min/1 73sq m     Narrative:      [place] guidelines for Chronic Kidney Disease (CKD):     Stage 1 with normal or high GFR (GFR > 90 mL/min/1 73 square meters)    Stage 2 Mild CKD (GFR = 60-89 mL/min/1 73 square meters)    Stage 3A Moderate CKD (GFR = 45-59 mL/min/1 73 square meters)    Stage 3B Moderate CKD (GFR = 30-44 mL/min/1 73 square meters)    Stage 4 Severe CKD (GFR = 15-29 mL/min/1 73 square meters)    Stage 5 End Stage CKD (GFR <15 mL/min/1 73 square meters)  Note: GFR calculation is accurate only with a steady state creatinine    C-reactive protein [[id number]]  (Abnormal) Collected: 01/21/22 1415    Lab Status: Final result Specimen: Blood from Arm, Left Updated: 01/21/22 1521      0 mg/L     Ferritin [[id number]]  (Normal) Collected: 01/21/22 1415    Lab Status: Final result Specimen: Blood from Arm, Left Updated: 01/21/22 1521     Ferritin 121 ng/mL     CK (with reflex to MB) [[id number]]  (Normal) Collected: 01/21/22 1415    Lab Status: Final result Specimen: Blood from Arm, Left Updated: 01/21/22 1521     Total CK 55 U/L     COVID/FLU/RSV [[id number]]  (Normal) Collected: 01/21/22 1415    Lab Status: Final result Specimen: Nares from Nose Updated: 01/21/22 1515     SARS-CoV-2 Negative     INFLUENZA A PCR Negative     INFLUENZA B PCR Negative     RSV PCR Negative    Narrative:      FOR PEDIATRIC PATIENTS - copy/paste COVID Guidelines URL to browser: [email or web address]  [email or web address]    SARS-CoV-2 assay is a Nucleic Acid Amplification assay intended for the  qualitative detection of nucleic acid from SARS-CoV-2 in nasopharyngeal  swabs  Results are for the presumptive identification of SARS-CoV-2 RNA  Positive results are indicative of infection with SARS-CoV-2, the virus  causing COVID-19, but do not rule out bacterial infection or co-infection  with other viruses  Laboratories within the [place] and its  territories are required to report all positive results to the appropriate  public health authorities  Negative results do not preclude SARS-CoV-2  infection and should not be used as the sole basis for treatment or other  patient management decisions  Negative results must be combined with  clinical observations, patient history, and epidemiological information  This test has not been FDA cleared or approved  This test has been authorized by FDA under an Emergency Use Authorization  (EUA)   This test is only authorized for the duration of time the  declaration that circumstances exist justifying the authorization of the  emergency use of an in vitro diagnostic tests for detection of SARS-CoV-2  virus and/or diagnosis of COVID-19 infection under section 564(b)(1) of  the Act, 21 U  [place] C  939JJQ-7(A)(2), unless the authorization is terminated  or revoked sooner  The test has been validated but independent review by FDA  and CLIA is pending  Test performed using [employer] GeneXpert: This RT-PCR assay targets N2,  a region unique to SARS-CoV-2  A conserved region in the E-gene was chosen  for pan-Sarbecovirus detection which includes SARS-CoV-2  Lactic acid, plasma [[id number]]  (Normal) Collected: 01/21/22 1415    Lab Status: Final result Specimen: Blood from Arm, Left Updated: 01/21/22 1510     LACTIC ACID 2 0 mmol/L     Narrative:      Result may be elevated if tourniquet was used during collection      CBC and differential [[id number]]  (Abnormal) Collected: 01/21/22 1415    Lab Status: Final result Specimen: Blood from Arm, Left Updated: 01/21/22 1435     WBC 10 89 Thousand/uL      RBC 4 45 Million/uL      Hemoglobin 14 4 g/dL      Hematocrit 43 3 %      MCV 97 fL      MCH 32 4 pg      MCHC 33 3 g/dL      RDW 15 2 %      MPV 10 9 fL      Platelets 661 Thousands/uL      nRBC 0 /100 WBCs      Neutrophils Relative 75 %      Immat GRANS % 2 %      Lymphocytes Relative 10 %      Monocytes Relative 11 %      Eosinophils Relative 1 %      Basophils Relative 1 %      Neutrophils Absolute 8 28 Thousands/µL      Immature Grans Absolute 0 18 Thousand/uL      Lymphocytes Absolute 1 09 Thousands/µL      Monocytes Absolute 1 18 Thousand/µL      Eosinophils Absolute 0 10 Thousand/µL      Basophils Absolute 0 06 Thousands/µL     Calcium, ionized [[id number]]  (Abnormal) Collected: 01/21/22 1415    Lab Status: Final result Specimen: Blood from Arm, Left Updated: 01/21/22 1427     Calcium, Ionized 1 00 mmol/L                  CTA ED chest PE study   Final Result by Tyler Perez DO (01/21 2337)      Multiple bilateral pulmonary emboli, as described with evidence of right heart strain  Cardiomegaly, moderate to severe multiple focal and confluent areas of groundglass and alveolar opacities seen in the bilateral lungs which could represent edema such as in the setting of fluid overload/CHF and/or infectious or inflammatory pneumonitis  Depending on the clinical setting  Correlation with the patient's symptoms, and laboratory values recommended  Mild scattered pulmonary emphysematous changes  Coronary atherosclerosis, shotty mediastinal and hilar lymph nodes, and other findings as above  I personally discussed this study with Dr Aleja Hernandez on 1/21/2022 at 10:43 PM                Workstation performed: [id number]         XR chest 1 view portable   ED Interpretation by Dennis Swartz MD (01/21 8561)   LEFT lower lobe PNA  Final Result by Sirisha Crockett MD (01/21 0935)      Left-sided pneumonia  Recommend follow-up to ensure complete clearing after treatment  Workstation performed: [id number]               Procedures  ECG 12 Lead Documentation Only    Date/Time: 1/21/2022 2:17 PM  Performed by: Miladys Borden DO  Authorized by:  Miladys Borden DO     Indications / Diagnosis:  Sob  ECG reviewed by me, the ED Provider: yes    Patient location:  ED  Previous ECG:     Previous ECG:  Compared to current    Similarity:  Changes noted  Interpretation:     Interpretation: abnormal    Rate:     ECG rate:  99    ECG rate assessment: normal    Rhythm:     Rhythm: sinus rhythm    Ectopy:     Ectopy: PVCs    QRS:     QRS axis:  Left    QRS intervals:  Normal  Conduction:     Conduction: normal    ST segments:     ST segments:  Non-specific  T waves:     T waves: inverted      Inverted:  II, III, aVF, V3, V2, V1, V4 and V5          ED Course  ED Course as of 01/22/22 0841   Fri Jan 21, 2022   1336 Blood Pressure(!): 84/70   1336 Temperature: 98 6 °F (37 °C)   1336 Temp Source: Tympanic   1336 Pulse: 89   1336 Respirations(!): 26   1336 SpO2: 96 %   1412 Patient was 54% on room air put her on 5 L nasal cannula and she came up to 93% with good waveform  Will evaluate patient with sepsis workup as well as COVID panel  Will give patient fluids and Decadron  Patient has no questions or concerns at this time will frequently re-evaluate  1426 Blood Pressure: 97/60   1426 Pulse: 102   1426 Respirations(!): 26   1426 SpO2: 90 %  On 5L NC   1428 Calcium, Ionized(!): 1 00   1438 WBC(!): 10 89   1444 Left sided pna on cxr will treat with Levaquin   1455 Calling lab for add on     1513 LACTIC ACID: 2 0   1513 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   1517 SARS-COV-2: Negative   1530 NT-proBNP(!): 9,960   1530 C-REACTIVE PROTEIN(!): 110 0   1556 Patient re-evaluated resting comfortably on 5 L nasal cannula  Patient is feeling much better not as short of breath  Informed her that we are starting antibiotics  Patient has no questions or concerns at this time will switch to higher oxygen delivery  1558 Blood Pressure: 103/65   1558 Pulse: 96   1558 Respirations: 20   1558 SpO2: 92 %   1600 Reached out to [facility] for admission  1610 Patient admitted to [address] [place] [address]  Patient aware of admission  Has no questions or concerns at this time  Stable  1610 Signing out patient  Advised to correct any electrolyte abn; advised to get CTA PE study if d dimer elevated  SBIRT 20yo+      Most Recent Value   SBIRT (22 yo +)    In order to provide better care to our patients, we are screening all of our patients for alcohol and drug use  Would it be okay to ask you these screening questions?  Unable to answer at this time Filed at: 01/21/2022 1427          Wells' Criteria for PE      Most Recent Value   Wells' Criteria for PE    Clinical signs and symptoms of DVT --   PE is primary diagnosis or equally likely 3 Filed at: 01/21/2022 1651   HR >100 1 5 Filed at: 01/21/2022 1651   Immobilization at least 3 days or Surgery in the previous 4 weeks --   Previous, objectively diagnosed PE or DVT 1 5 Filed at: 01/21/2022 1651   Hemoptysis --   Malignancy with treatment within 6 months or palliative --   Wells' Criteria Total 6 Filed at: 01/21/2022 1651            MDM    Disposition  Final diagnoses:   Hypoxia   SOB (shortness of breath)   Pneumonia   CHF (congestive heart failure) ([facility] [address] )     Time reflects when diagnosis was documented in both MDM as applicable and the Disposition within this note     Time User Action Codes Description Comment    1/21/2022  3:58 PM Giovani Tressa Add [R09 02] Hypoxia     1/21/2022  3:58 PM Palladino, Chris Skeens Add [R06 02] SOB (shortness of breath)     1/21/2022  3:59 PM Giovani Tressa Add [J18 9] Pneumonia     1/21/2022  4:04 PM Palladino, Chris Skeens Add [I50 9] CHF (congestive heart failure) [facility] - [place])       ED Disposition     ED Disposition Condition Date/Time Comment    Admit Stable Fri Jan 21, 2022  4:04 PM Case was discussed with dr Dover and the patient's admission status was agreed to be inpatient to the service of Dr Elvira Reyes  Follow-up Information    None         Patient's Medications   Discharge Prescriptions    No medications on file     No discharge procedures on file  PDMP Review       Value Time User    PDMP Reviewed  Yes 10/22/2021  1:08 PM Vic Moreira DO           ED Provider  Attending physically available and evaluated Neema Greenbergjohan GARCIA managed the patient along with the ED Attending      Electronically Signed by         Briana Mcpherson DO  01/21/22 [address] [place], [place] [address]DO  01/22/22 2565

## 2022-01-21 NOTE — ASSESSMENT & PLAN NOTE
Likely multifactorial  Acute respiratory distress noted to be secondary to pneumonia versus COPD however also noted significantly elevated D-dimer at 3 4  Patient noted to have multiple pulmonary emboli on the CT-was on heparin drip and transition to Pradaxa  Patient was transferred to intensive care unit  Currently on high-flow oxygen  Pulmonology evaluation appreciated  Patient is currently on Pradaxa    Failed Eliquis since the patient had a PE on Eliquis  Pulmonology on board  Continue with the high-flow oxygen and wean off as tolerated

## 2022-01-22 ENCOUNTER — APPOINTMENT (INPATIENT)
Dept: NON INVASIVE DIAGNOSTICS | Facility: HOSPITAL | Age: 67
DRG: 177 | End: 2022-01-22
Payer: COMMERCIAL

## 2022-01-22 LAB
ANION GAP SERPL CALCULATED.3IONS-SCNC: 7 MMOL/L (ref 4–13)
APICAL FOUR CHAMBER EJECTION FRACTION: 59 %
APTT PPP: 195 SECONDS (ref 23–37)
APTT PPP: 62 SECONDS (ref 23–37)
APTT PPP: 64 SECONDS (ref 23–37)
APTT PPP: >210 SECONDS (ref 23–37)
BASE EXCESS BLDA CALC-SCNC: 1.5 MMOL/L
BUN SERPL-MCNC: 29 MG/DL (ref 5–25)
CALCIUM SERPL-MCNC: 9.3 MG/DL (ref 8.3–10.1)
CHLORIDE SERPL-SCNC: 94 MMOL/L (ref 100–108)
CO2 SERPL-SCNC: 29 MMOL/L (ref 21–32)
CREAT SERPL-MCNC: 1.38 MG/DL (ref 0.6–1.3)
ERYTHROCYTE [DISTWIDTH] IN BLOOD BY AUTOMATED COUNT: 15 % (ref 11.6–15.1)
FRACTIONAL SHORTENING: 34 % (ref 28–44)
GFR SERPL CREATININE-BSD FRML MDRD: 39 ML/MIN/1.73SQ M
GLUCOSE SERPL-MCNC: 136 MG/DL (ref 65–140)
HCO3 BLDA-SCNC: 26.4 MMOL/L (ref 22–28)
HCT VFR BLD AUTO: 40 % (ref 34.8–46.1)
HFNC FLOW LPM: 55
HGB BLD-MCNC: 13.5 G/DL (ref 11.5–15.4)
INTERVENTRICULAR SEPTUM IN DIASTOLE (PARASTERNAL SHORT AXIS VIEW): 1.1 CM
L PNEUMO1 AG UR QL IA.RAPID: NEGATIVE
LEFT INTERNAL DIMENSION IN SYSTOLE: 2.7 CM (ref 2.1–4)
LEFT VENTRICULAR INTERNAL DIMENSION IN DIASTOLE: 4.1 CM (ref 5.04–7.51)
LEFT VENTRICULAR POSTERIOR WALL IN END DIASTOLE: 1 CM
LEFT VENTRICULAR STROKE VOLUME: 48 ML
MAGNESIUM SERPL-MCNC: 1.7 MG/DL (ref 1.6–2.6)
MCH RBC QN AUTO: 32.2 PG (ref 26.8–34.3)
MCHC RBC AUTO-ENTMCNC: 33.8 G/DL (ref 31.4–37.4)
MCV RBC AUTO: 96 FL (ref 82–98)
NON VENT HFNC FIO2: 100
NON VENT TYPE HFNC: NORMAL
O2 CT BLDA-SCNC: 18.8 ML/DL (ref 16–23)
OXYHGB MFR BLDA: 94.6 % (ref 94–97)
PA SYSTOLIC PRESSURE: 44 MMHG
PCO2 BLDA: 42.8 MM HG (ref 36–44)
PH BLDA: 7.41 [PH] (ref 7.35–7.45)
PLATELET # BLD AUTO: 127 THOUSANDS/UL (ref 149–390)
PMV BLD AUTO: 11.3 FL (ref 8.9–12.7)
PO2 BLDA: 82.8 MM HG (ref 75–129)
POTASSIUM SERPL-SCNC: 4.6 MMOL/L (ref 3.5–5.3)
PROCALCITONIN SERPL-MCNC: 0.21 NG/ML
RBC # BLD AUTO: 4.19 MILLION/UL (ref 3.81–5.12)
RIGHT VENTRICLE ID DIMENSION: 3.7 CM
S PNEUM AG UR QL: NEGATIVE
SL CV LV EF: 55
SL CV PED ECHO LEFT VENTRICLE DIASTOLIC VOLUME (MOD BIPLANE) 2D: 75 ML
SL CV PED ECHO LEFT VENTRICLE SYSTOLIC VOLUME (MOD BIPLANE) 2D: 27 ML
SODIUM SERPL-SCNC: 130 MMOL/L (ref 136–145)
SPECIMEN SOURCE: NORMAL
TR MAX PG: 34 MMHG
TRICUSPID VALVE PEAK REGURGITATION VELOCITY: 2.93 M/S
WBC # BLD AUTO: 9.09 THOUSAND/UL (ref 4.31–10.16)
Z-SCORE OF LEFT VENTRICULAR DIMENSION IN END SYSTOLE: -4.05

## 2022-01-22 PROCEDURE — 93321 DOPPLER ECHO F-UP/LMTD STD: CPT | Performed by: INTERNAL MEDICINE

## 2022-01-22 PROCEDURE — 94760 N-INVAS EAR/PLS OXIMETRY 1: CPT

## 2022-01-22 PROCEDURE — 80048 BASIC METABOLIC PNL TOTAL CA: CPT | Performed by: FAMILY MEDICINE

## 2022-01-22 PROCEDURE — 85730 THROMBOPLASTIN TIME PARTIAL: CPT | Performed by: INTERNAL MEDICINE

## 2022-01-22 PROCEDURE — 93308 TTE F-UP OR LMTD: CPT

## 2022-01-22 PROCEDURE — 94664 DEMO&/EVAL PT USE INHALER: CPT

## 2022-01-22 PROCEDURE — 99205 OFFICE O/P NEW HI 60 MIN: CPT | Performed by: INTERNAL MEDICINE

## 2022-01-22 PROCEDURE — 94640 AIRWAY INHALATION TREATMENT: CPT

## 2022-01-22 PROCEDURE — 84145 PROCALCITONIN (PCT): CPT

## 2022-01-22 PROCEDURE — 36600 WITHDRAWAL OF ARTERIAL BLOOD: CPT

## 2022-01-22 PROCEDURE — 93308 TTE F-UP OR LMTD: CPT | Performed by: INTERNAL MEDICINE

## 2022-01-22 PROCEDURE — 82805 BLOOD GASES W/O2 SATURATION: CPT | Performed by: INTERNAL MEDICINE

## 2022-01-22 PROCEDURE — 83735 ASSAY OF MAGNESIUM: CPT | Performed by: INTERNAL MEDICINE

## 2022-01-22 PROCEDURE — 36415 COLL VENOUS BLD VENIPUNCTURE: CPT

## 2022-01-22 PROCEDURE — 93325 DOPPLER ECHO COLOR FLOW MAPG: CPT | Performed by: INTERNAL MEDICINE

## 2022-01-22 PROCEDURE — 85027 COMPLETE CBC AUTOMATED: CPT | Performed by: FAMILY MEDICINE

## 2022-01-22 RX ORDER — DEXAMETHASONE SODIUM PHOSPHATE 4 MG/ML
0.1 INJECTION, SOLUTION INTRA-ARTICULAR; INTRALESIONAL; INTRAMUSCULAR; INTRAVENOUS; SOFT TISSUE EVERY 12 HOURS
Status: DISCONTINUED | OUTPATIENT
Start: 2022-01-22 | End: 2022-01-30

## 2022-01-22 RX ADMIN — DEXAMETHASONE SODIUM PHOSPHATE 9.08 MG: 4 INJECTION INTRA-ARTICULAR; INTRALESIONAL; INTRAMUSCULAR; INTRAVENOUS; SOFT TISSUE at 21:15

## 2022-01-22 RX ADMIN — ASPIRIN 81 MG: 81 TABLET, COATED ORAL at 11:21

## 2022-01-22 RX ADMIN — ATENOLOL 25 MG: 25 TABLET ORAL at 14:12

## 2022-01-22 RX ADMIN — DORNASE ALFA 2.5 MG: 1 SOLUTION RESPIRATORY (INHALATION) at 20:05

## 2022-01-22 RX ADMIN — TRAZODONE HYDROCHLORIDE 25 MG: 50 TABLET ORAL at 21:17

## 2022-01-22 RX ADMIN — GABAPENTIN 300 MG: 300 CAPSULE ORAL at 16:34

## 2022-01-22 RX ADMIN — GABAPENTIN 300 MG: 300 CAPSULE ORAL at 21:17

## 2022-01-22 RX ADMIN — BARICITINIB 2 MG: 2 TABLET, FILM COATED ORAL at 10:03

## 2022-01-22 RX ADMIN — CEFTRIAXONE SODIUM 1000 MG: 10 INJECTION, POWDER, FOR SOLUTION INTRAVENOUS at 01:35

## 2022-01-22 RX ADMIN — METHYLPREDNISOLONE SODIUM SUCCINATE 40 MG: 40 INJECTION, POWDER, FOR SOLUTION INTRAMUSCULAR; INTRAVENOUS at 05:25

## 2022-01-22 RX ADMIN — HEPARIN SODIUM 12 UNITS/KG/HR: 10000 INJECTION, SOLUTION INTRAVENOUS at 23:28

## 2022-01-22 RX ADMIN — ATORVASTATIN CALCIUM 40 MG: 40 TABLET, FILM COATED ORAL at 21:17

## 2022-01-22 RX ADMIN — DEXAMETHASONE SODIUM PHOSPHATE 9.08 MG: 4 INJECTION INTRA-ARTICULAR; INTRALESIONAL; INTRAMUSCULAR; INTRAVENOUS; SOFT TISSUE at 10:03

## 2022-01-22 RX ADMIN — DORNASE ALFA 2.5 MG: 1 SOLUTION RESPIRATORY (INHALATION) at 09:13

## 2022-01-22 RX ADMIN — FLUOXETINE HYDROCHLORIDE 30 MG: 10 CAPSULE ORAL at 11:21

## 2022-01-22 RX ADMIN — GABAPENTIN 300 MG: 300 CAPSULE ORAL at 10:03

## 2022-01-22 RX ADMIN — PANTOPRAZOLE SODIUM 20 MG: 20 TABLET, DELAYED RELEASE ORAL at 10:03

## 2022-01-22 NOTE — UTILIZATION REVIEW
Inpatient Admission Authorization Request   NOTIFICATION OF INPATIENT ADMISSION/INPATIENT AUTHORIZATION REQUEST   SERVICING FACILITY:   [facility]  Address: [address] [place] [address], [address] [place] [address]  Tax ID: [id number]  NPI: [id number]  Place of Service: Inpatient [address] [facility] Code: 24     ATTENDING PROVIDER:  Attending Name and NPI#: Javid Brannon [[id number]]  Address: [address] [place] [address], [address] [place] [address]  Phone: [phone]     UTILIZATION REVIEW CONTACT:  Leyla Melissa Utilization   Network Utilization Review Department  Phone: [phone]  Fax: [phone]  Email: [email or web address]     PHYSICIAN ADVISORY SERVICES:  FOR [id number] REVIEW - MEDICAL NECESSITY DENIAL  Phone: [phone]  Fax: [phone]  Email: [email or web address]     TYPE OF REQUEST:  Inpatient Status     ADMISSION INFORMATION:  ADMISSION DATE/TIME: 1/21/22  4:09 PM  PATIENT DIAGNOSIS CODE/DESCRIPTION:  No admission diagnoses are documented for this encounter  DISCHARGE DATE/TIME: No discharge date for patient encounter  DISCHARGE DISPOSITION (IF DISCHARGED): Home/Self Care     IMPORTANT INFORMATION:  Please contact the Leyla Melissa directly with any questions or concerns regarding this request  Department voicemails are confidential     Send requests for admission clinical reviews, concurrent reviews, approvals, and administrative denials due to lack of clinical to fax [phone]

## 2022-01-22 NOTE — RESPIRATORY THERAPY NOTE
RT Protocol Note  Hoang Lundy 77 y o  female MRN: [id number]  Unit/Bed#: ED 11 Encounter: [id number]    Assessment    Principal Problem:    Community acquired pneumonia of left lower lobe of lung  Active Problems:    Benign essential hypertension    Depressive disorder    Prothrombin mutation (HCC)    Hx of pulmonary embolus    Obesity, morbid (HCC)    Acute respiratory failure with hypoxia (HCC)    Hypomagnesemia    Hypokalemia      Home Pulmonary Medications:  Albuterol neb every 4hrs prn  Home Devices/Therapy: Home O2    Past Medical History:   Diagnosis Date    Acid reflux     Anxiety     Arthritis     Back pain     Carpal tunnel syndrome, bilateral     Depression     Endometrial cancer ([facility] [address] ) 1997    Hiatal hernia     History of DVT of lower extremity     Right leg-had IVc filter  removal today-6/6/2016    History of pulmonary embolism     History of uterine cancer     Was stage 3- uterine, ovaries-hysterectomy    Hypercholesteremia     Hypertension     Left leg pain     Lumbar herniated disc     Tumor cells, benign     In right atrium  Probably congenital     Varicose vein of leg     Wears glasses      Social History     Socioeconomic History    Marital status:       Spouse name: None    Number of children: 1    Years of education: None    Highest education level: None   Occupational History    Occupation: retired   Tobacco Use    Smoking status: Former Smoker     Packs/day: 0 50     Years: 20 00     Pack years: 10 00     Types: Cigarettes    Smokeless tobacco: Never Used    Tobacco comment: Smokes 5 cigarettes daily,hx age 12 to 2012-2ppd   Vaping Use    Vaping Use: Never used   Substance and Sexual Activity    Alcohol use: Yes     Comment: 2 or 3 monthly    Drug use: No    Sexual activity: Not Currently     Partners: Male   Other Topics Concern    None   Social History Narrative    Coffee    Daily caffeinated cola consumption     Social Determinants of Health Financial Resource Strain: Not on file   Food Insecurity: Not on file   Transportation Needs: Not on file   Physical Activity: Not on file   Stress: Not on file   Social Connections: Not on file   Intimate Partner Violence: Not on file   Housing Stability: Not on file       Subjective         Objective    Physical Exam:   Assessment Type: Assess only  General Appearance: Awake  Chest Assessment: Chest expansion symmetrical  Bilateral Breath Sounds: Diminished    Vitals:  Blood pressure 110/70, pulse 78, temperature 98 6 °F (37 °C), temperature source Tympanic, resp  rate 18, SpO2 94 %  Imaging and other studies: I have personally reviewed pertinent reports  O2 Device: hfnc     Plan pt is already ordered prn nebs  Plan is to keep prn neb order  Respiratory Plan: No distress/Pulmonary history,Vent/NIV/HFNC        Resp Comments: pt placed on hfnc at  65% 45L  and now resting comfortably   wll continue to evaluate for possible weaning

## 2022-01-22 NOTE — UTILIZATION REVIEW
Initial Clinical Review    Admission: Date/Time/Statement:   Admission Orders (From admission, onward)     Ordered        01/21/22 1609  Inpatient Admission  Once                      Orders Placed This Encounter   Procedures    Inpatient Admission     Standing Status:   Standing     Number of Occurrences:   1     Order Specific Question:   Level of Care     Answer:   Med Surg [16]     Order Specific Question:   Estimated length of stay     Answer:   More than 2 Midnights     Order Specific Question:   Certification     Answer:   I certify that inpatient services are medically necessary for this patient for a duration of greater than two midnights  See H&P and MD Progress Notes for additional information about the patient's course of treatment  ED Arrival Information     Expected Arrival Acuity    - 1/21/2022 13:20 Emergent         Means of arrival Escorted by Service Admission type    Walk-In Family Member Critical Care/ICU Emergency         Arrival complaint    sob        Chief Complaint   Patient presents with    Shortness of Breath     family all had covid on 1/10, pt had symptoms but was not tested  never got better, remains with SOB     Initial Presentation:   73y Female to ED presents with progressive worsening of breath for past couple of wks  Pt exposed to COVID-19, in ED negative for COVID-19  Found to be hypoxic requiring up to O2 5L NC  Pt also c/o fever, chills and productive cough  Elevated BNP  CXR showed left-sided pneumonia  PMH for PE s/p IVC filter removal in 2016, on Eliquis, prothrombin gene mutation on Eliquis, CVA, smoker, GERD, HTN, depressive disorder, dyslipidemia  Admit Inpatient level of care for Community acquired Pneumonia of left lower lobe, Acute respirator failure hypoxia  Negative COVID/FLU/RSV  Start IV antibiotics  Sputum culture, urinary legionella/strep  Bld cultures pending  Duo neb q6h  Wean O2 as able  Currently on O2 5L NC   On exam; Wheezing      1/21  Per Nursing notes; Noted O2 sat 87% on 6L NC  Pt visible tired and working hard to breath  Progress notes; Acute respiratory distress noted to be secondary to pneumonia versus COPD however also noted significantly elevated D-dimer at 3 4  Stat CTA Pe ordered, Also noted with elevated INR of 2 89, not on coumadin  Check liver panel  Pt with increasing O2 need, HFNC ordered  Start Iv steriod and Iv Lasix x1 dose  Pulmonology consult  Date: 1/22   Day 2:   Pulmonology cons; Acute hypoxic respiratory failure - multifactorial - now on HFNC 100%  Continue supplemental oxygen for sats greater than 88%  Acute PE - Eliquis failure -  with history of previous PE/prothrombin gene mutation  On Iv Heparin drip  Echo  Continue Iv Steriod  Start Baricitinib daily and dornase nebs bid  Procal negative x2, will d/c antibiotics  Check ABG  No indication for remdesivir as her symptoms were 3 weeks ago  Add Xopenex nebs tid  Recommend outpatient pulmonary follow-up with PFTs   On exam;  Diminished breath sounds bilateral with coarse inspiratory crackles bilateral     ED Triage Vitals   Temperature Pulse Respirations Blood Pressure SpO2   01/21/22 1329 01/21/22 1329 01/21/22 1329 01/21/22 1329 01/21/22 1329   98 6 °F (37 °C) 89 (!) 26 (!) 84/70 96 %      Temp Source Heart Rate Source Patient Position - Orthostatic VS BP Location FiO2 (%)   01/21/22 1329 01/21/22 1329 01/21/22 1329 01/21/22 1329 01/21/22 1954   Tympanic Monitor Sitting Left arm 65      Pain Score       01/21/22 1607       No Pain          Wt Readings from Last 1 Encounters:   12/24/21 90 7 kg (200 lb)     Additional Vital Signs:   01/22/22 0730 -- 84 22 123/83 99 90 % -- 80 -- 15 L/min High flow nasal cannula -- Lying   01/22/22 0500 -- 78 18 110/70 -- 94 % -- 80 -- 15 L/min High flow nasal cannula -- Lying   01/22/22 0300 -- -- -- -- -- 93 % 100 -- 55 L/min -- High flow nasal cannula --      01/21/22 1838 -- 104 -- -- -- 88 %   Abnormal  -- 80 -- 15 L/min High flow nasal cannula -- --   01/21/22 1830 -- 104 -- -- -- 75 %   Abnormal  -- -- -- -- None (Room air) -- --   01/21/22 1800 -- 98 -- 106/73 87 90 % -- -- -- -- -- -- --   01/21/22 1742 -- 100 20 -- -- 87 %   Abnormal  -- 80 -- 15 L/min Mid flow nasal cannula -- --   01/21/22 1700 -- -- -- 107/67 83 -- -- -- -- -- -- -- --   01/21/22 1645 -- 100 -- -- -- 87 %   Abnormal  -- -- -- -- -- -- --   01/21/22 1630 -- 98 -- 104/75 86 -- -- -- -- -- -- -- --   01/21/22 1600 -- 96 -- 105/69 83 94 % -- -- -- -- Nasal cannula -- --   01/21/22 1530 -- 96 20 103/65 77 92 % -- 40 -- 5 L/min Nasal cannula       Pertinent Labs/Diagnostic Test Results:   1/21  PCXR - Left-sided pneumonia  Recommend follow-up to ensure complete clearing after treatment  CTA ed chest pe study - Multiple bilateral pulmonary emboli, as described with evidence of right heart strain  Cardiomegaly, moderate to severe multiple focal and confluent areas of groundglass and alveolar opacities seen in the bilateral lungs which could represent edema such as in the setting of fluid overload/CHF and/or infectious or inflammatory pneumonitis  Depending on the clinical setting  Correlation with the patient's symptoms, and laboratory values recommended  Mild scattered pulmonary emphysematous changes    Coronary atherosclerosis, shotty mediastinal and hilar lymph nodes, and other findings            Results from last 7 days   Lab Units 01/21/22  1415   SARS-COV-2  Negative     Results from last 7 days   Lab Units 01/22/22  0524 01/21/22  1415   WBC Thousand/uL 9 09 10 89*   HEMOGLOBIN g/dL 13 5 14 4   HEMATOCRIT % 40 0 43 3   PLATELETS Thousands/uL 127* 137*   NEUTROS ABS Thousands/µL  --  8 28*         Results from last 7 days   Lab Units 01/22/22  0524 01/21/22  1415   SODIUM mmol/L 130* 131*   POTASSIUM mmol/L 4 6 3 3*   CHLORIDE mmol/L 94* 94*   CO2 mmol/L 29 28   ANION GAP mmol/L 7 9   BUN mg/dL 29* 23   CREATININE mg/dL 1 38* 1 40*   EGFR ml/min/1 73sq m 39 39 CALCIUM mg/dL 9 3 9 0   CALCIUM, IONIZED mmol/L  --  1 00*   MAGNESIUM mg/dL 1 7 0 9*     Results from last 7 days   Lab Units 01/21/22  1415   AST U/L 34   ALT U/L 13   ALK PHOS U/L 71   TOTAL PROTEIN g/dL 7 6   ALBUMIN g/dL 2 6*   TOTAL BILIRUBIN mg/dL 0 76   BILIRUBIN DIRECT mg/dL 0 27*         Results from last 7 days   Lab Units 01/22/22  0524 01/21/22  1415   GLUCOSE RANDOM mg/dL 136 96             No results found for: BETA-HYDROXYBUTYRATE   Results from last 7 days   Lab Units 01/22/22  0907   PH ART  7 408   PCO2 ART mm Hg 42 8   PO2 ART mm Hg 82 8   HCO3 ART mmol/L 26 4   BASE EXC ART mmol/L 1 5   O2 CONTENT ART mL/dL 18 8   O2 HGB, ARTERIAL % 94 6   ABG SOURCE  Radial, Right   NON VENT TYPE HFNC  HFNC Flow   HFNC FLOW LPM  55             Results from last 7 days   Lab Units 01/21/22  1415   CK TOTAL U/L 55     Results from last 7 days   Lab Units 01/21/22  1944 01/21/22  1552 01/21/22  1415   HS TNI 0HR ng/L  --   --  80   HS TNI 2HR ng/L  --  99  --    HSTNI D2 ng/L  --  19  --    HS TNI 4HR ng/L 81  --   --    HSTNI D4 ng/L 1  --   --      Results from last 7 days   Lab Units 01/21/22  1552   D-DIMER QUANTITATIVE ug/ml FEU 3 45*     Results from last 7 days   Lab Units 01/22/22  0524 01/21/22  2345 01/21/22  1552   PROTIME seconds  --   --  28 8*   INR   --   --  2 89*   PTT seconds 195* >210*  --          Results from last 7 days   Lab Units 01/22/22  0524 01/21/22  1415   PROCALCITONIN ng/ml 0 21 0 19     Results from last 7 days   Lab Units 01/21/22  1415   LACTIC ACID mmol/L 2 0             Results from last 7 days   Lab Units 01/21/22  1415   NT-PRO BNP pg/mL 9,960*     Results from last 7 days   Lab Units 01/21/22  1415   FERRITIN ng/mL 121             Results from last 7 days   Lab Units 01/21/22  1415   CRP mg/L 110 0*         Results from last 7 days   Lab Units 01/21/22  2209 01/21/22  1415   STREP PNEUMONIAE ANTIGEN, URINE  Negative  --    LEGIONELLA URINARY ANTIGEN  Negative  -- INFLUENZA A PCR   --  Negative   INFLUENZA B PCR   --  Negative   RSV PCR   --  Negative       Results from last 7 days   Lab Units 01/21/22  2342 01/21/22  1415   BLOOD CULTURE   --  Received in Microbiology Lab  Culture in Progress  Received in Microbiology Lab  Culture in Progress     GRAM STAIN RESULT  No Epithelial cells seen*  2+ Gram positive cocci in clusters*  1+ Gram negative rods*  No polys seen*  --      ED Treatment:   Medication Administration from 01/21/2022 1320 to 01/22/2022 1207       Date/Time Order Dose Route Action     01/21/2022 1435 dexamethasone (DECADRON) injection 8 mg 8 mg Intravenous Given     01/21/2022 1552 multi-electrolyte (ISOLYTE-S PH 7 4) bolus 1,000 mL 0 mL Intravenous Stopped     01/21/2022 1435 multi-electrolyte (ISOLYTE-S PH 7 4) bolus 1,000 mL 1,000 mL Intravenous New Bag     01/21/2022 1723 levofloxacin (LEVAQUIN) IVPB (premix in dextrose) 750 mg 150 mL 0 mg Intravenous Stopped     01/21/2022 1553 levofloxacin (LEVAQUIN) IVPB (premix in dextrose) 750 mg 150 mL 750 mg Intravenous New Bag     01/21/2022 1907 magnesium sulfate 2 g/50 mL IVPB (premix) 2 g 0 g Intravenous Stopped     01/21/2022 1707 magnesium sulfate 2 g/50 mL IVPB (premix) 2 g 2 g Intravenous New Bag     01/21/2022 2122 apixaban (ELIQUIS) tablet 5 mg 0 mg Oral Hold     01/22/2022 1121 aspirin (ECOTRIN LOW STRENGTH) EC tablet 81 mg 81 mg Oral Given     01/21/2022 2205 atorvastatin (LIPITOR) tablet 40 mg 40 mg Oral Given     01/22/2022 1121 FLUoxetine (PROzac) capsule 30 mg 30 mg Oral Given     01/22/2022 1003 gabapentin (NEURONTIN) capsule 300 mg 300 mg Oral Given     01/21/2022 2206 gabapentin (NEURONTIN) capsule 300 mg 300 mg Oral Given     01/22/2022 1003 pantoprazole (PROTONIX) EC tablet 20 mg 20 mg Oral Given     01/21/2022 2122 traZODone (DESYREL) tablet 25 mg 0 mg Oral Hold     01/22/2022 0205 ceftriaxone (ROCEPHIN) 1 g/50 mL in dextrose IVPB 0 mg Intravenous Stopped     01/22/2022 0135 ceftriaxone (ROCEPHIN) 1 g/50 mL in dextrose IVPB 1,000 mg Intravenous New Bag     01/21/2022 1656 potassium chloride (K-DUR,KLOR-CON) CR tablet 40 mEq 40 mEq Oral Given     01/22/2022 0525 methylPREDNISolone sodium succinate (Solu-MEDROL) injection 40 mg 40 mg Intravenous Given     01/21/2022 2206 methylPREDNISolone sodium succinate (Solu-MEDROL) injection 40 mg 40 mg Intravenous Given     01/21/2022 1855 furosemide (LASIX) injection 40 mg 40 mg Intravenous Given     01/21/2022 2146 iohexol (OMNIPAQUE) 350 MG/ML injection (SINGLE-DOSE) 85 mL 85 mL Intravenous Given     01/21/2022 2345 heparin (VTE/PE) high 0 mL Intravenous Hold     01/21/2022 2333 heparin (porcine) injection 7,200 Units 7,200 Units Intravenous Given     01/22/2022 0840 heparin (porcine) 25,000 units in 0 45% NaCl 250 mL infusion (premix) 12 Units/kg/hr Intravenous Restarted     01/22/2022 0722 heparin (porcine) 25,000 units in 0 45% NaCl 250 mL infusion (premix) 0 Units/kg/hr Intravenous Hold     01/22/2022 0205 heparin (porcine) 25,000 units in 0 45% NaCl 250 mL infusion (premix) 15 Units/kg/hr Intravenous Restarted     01/22/2022 0204 heparin (porcine) 25,000 units in 0 45% NaCl 250 mL infusion (premix) 15 Units/kg/hr Intravenous Rate/Dose Change     01/22/2022 0105 heparin (porcine) 25,000 units in 0 45% NaCl 250 mL infusion (premix) 0 Units/kg/hr Intravenous Hold     01/21/2022 2333 heparin (porcine) 25,000 units in 0 45% NaCl 250 mL infusion (premix) 18 Units/kg/hr Intravenous New Bag     01/22/2022 1003 dexamethasone (DECADRON) injection 9 08 mg 9 08 mg Intravenous Given     01/22/2022 0926 Baricitinib (OLUMIANT) (COVID EUA) tablet 4 mg 4 mg Oral Not Given     01/22/2022 0913 dornase madiha (PULMOZYME) inhalation solution 2 5 mg 2 5 mg Inhalation Given     01/22/2022 1003 Baricitinib (OLUMIANT) (COVID EUA) tablet 2 mg 2 mg Oral Given        Past Medical History:   Diagnosis Date    Acid reflux     Anxiety     Arthritis     Back pain     Carpal tunnel syndrome, bilateral     Depression     Endometrial [facility] ([facility] ) 1997    Hiatal hernia     History of DVT of lower extremity     Right leg-had IVc filter  removal today-6/6/2016    History of pulmonary embolism     History of uterine cancer     Was stage 3- uterine, ovaries-hysterectomy    Hypercholesteremia     Hypertension     Left leg pain     Lumbar herniated disc     Tumor cells, benign     In right atrium  Probably congenital     Varicose vein of leg     Wears glasses      Present on Admission:   Hx of pulmonary embolus   Benign essential hypertension   Depressive disorder   Prothrombin mutation ([facility] )   Obesity, morbid ([place] [address] )      Admitting Diagnosis: No admission diagnoses are documented for this encounter  Age/Sex: 77 y o  female     Admission Orders:  Scheduled Medications:  aspirin, 81 mg, Oral, Daily  atenolol, 25 mg, Oral, Daily  atorvastatin, 40 mg, Oral, QPM  Baricitinib, 2 mg, Oral, Q24H  dexamethasone, 0 1 mg/kg, Intravenous, Q12H  dornase madiha, 2 5 mg, Inhalation, BID  FLUoxetine, 30 mg, Oral, Daily  gabapentin, 300 mg, Oral, TID  pantoprazole, 20 mg, Oral, Daily  traZODone, 25 mg, Oral, HS    furosemide (LASIX) injection 40 mg  Dose: 40 mg  Freq: Once Route: IV  Start: 01/21/22 1800 End: 01/21/22 1855    Continuous IV Infusions:  heparin (porcine), 3-30 Units/kg/hr (Order-Specific), Intravenous, Titrated      PRN Meds:  acetaminophen, 650 mg, Oral, Q6H PRN  albuterol, 2 5 mg, Nebulization, Q4H PRN  benzonatate, 100 mg, Oral, TID PRN  heparin (porcine), 3,600 Units, Intravenous, Q1H PRN  heparin (porcine), 7,200 Units, Intravenous, Q1H PRN  ondansetron, 4 mg, Intravenous, Q6H PRN      Bld culture x2  IP CONSULT TO PULMONOLOGY    Network Utilization Review Department  ATTENTION: Please call with any questions or concerns to [phone] and carefully listen to the prompts so that you are directed to the right person   All voicemails are confidential   Lynette Kussmaul all requests for admission clinical reviews, approved or denied determinations and any other requests to dedicated fax number below belonging to the campus where the patient is receiving treatment   List of dedicated fax numbers for the Facilities:  [address] [place] [address] DENIALS (Administrative/Medical Necessity) [phone]   [address]  16[facility] (Maternity/NICU/Pediatrics) [phone]   [address] [place] [address]  [address] [facility] [place] [address] [id number] [address] [facility] [address] [facility] [place] [address] [phone]

## 2022-01-22 NOTE — CONSULTS
Pulmonary/Critical Care Consultation and Acceptance Note to Step Down  Jordan Edmonds 77 y o  female MRN: [id number]  Unit/Bed#: ED 11 Encounter: [id number]      Reason for consultation: hypoxic respiratory failure    Requesting physician: Dr [facility]    Impressions/Plans:    1  Acute hypoxic respiratory failure - multifactorial - now on HFNC 100%  · Continue supplemental oxygen for sats greater than 88%    2  Acute PE - Eliquis failure -  with history of previous PE/prothrombin gene mutation   · Continue heparin infusion  · Check echocardiogram with report of right heart strain on CTA  · At this time hemodynamically stable and would hold off on any thrombolytics or catheter directed therapies    3  Abnormal CT chest with Bilateral ground-glass opacities/reticular opacities secondary to probable COVID infection 3 weeks ago  The patient had symptoms, her entire family tested positive, she did not test   On admission she was negative, likely because she cleared the virus  · In the setting of high-flow oxygen requirements, and severe abnormalities on CT chest, will treat as severe COVID following treatment protocol  · Start Decadron 0 1 mg b i d   · Start baricitinib 4 mg p o  daily  · Start dornase nebulizer b i d  · With negative procalcitonin x2, will discontinue empiric antibiotics  · Check ABG  · No indication for remdesivir as her symptoms were 3 weeks ago  · Recommend self proning  · Out of bed to chair, incentive spirometer    4  Tobacco dependence, in remission since October 2021  No PFTs on record at this time  · Will add Xopenex nebs t i d  · Would recommend outpatient pulmonary follow-up with PFTs        History of Present Illness   HPI:  Jordan Edmonds is a 77 y o  female who presents after 2-3 weeks of not feeling well  She states that between Christmas and new year's she started to have severe fatigue, cough, increased shortness of breath    Around new year's her entire family tested positive for COVID, she assumed she had COVID so she did not take a test   She is vaccinated x2 against COVID, last in April 2021  She is scheduled for booster next week  Her symptoms continued to worsen over the past couple weeks, now presenting to the hospital with severe shortness of breath and found to be hypoxic  She is now on high-flow nasal cannula oxygen  She has cough intermittently productive of phlegm, most recent expectoration in the emergency room had some blood in it  She denies any fevers or chills  She denies any chest pain  She denies any wheezing  She denies any lower extremity edema or calf pain/tenderness  She has not had any nausea or vomiting  She does report some constipation  She states she has not been eating well she just has not had an appetite  She has a history of pulmonary embolism and IVC filter with retrieval several years ago  She was found to have prothrombin gene mutation and takes Eliquis daily  She states she has not missed any doses of Eliquis  She is a former smoker who quit in October of 2021  She does not endorse any known history of lung disease  Review of systems:  12 point review of systems was completed and was otherwise negative except as listed in HPI  Historical Information   Past Medical History:   Diagnosis Date    Acid reflux     Anxiety     Arthritis     Back pain     Carpal tunnel syndrome, bilateral     Depression     Endometrial cancer ([facility] [address] ) 1997    Hiatal hernia     History of DVT of lower extremity     Right leg-had IVc filter  removal today-6/6/2016    History of pulmonary embolism     History of uterine cancer     Was stage 3- uterine, ovaries-hysterectomy    Hypercholesteremia     Hypertension     Left leg pain     Lumbar herniated disc     Tumor cells, benign     In right atrium   Probably congenital     Varicose vein of leg     Wears glasses      Past Surgical History:   Procedure Laterality Date    COLONOSCOPY      DENTAL SURGERY      DILATION AND CURETTAGE OF UTERUS      ESOPHAGOGASTRODUODENOSCOPY      FOOT SURGERY      LAST ASSESSED: [id number]    HYSTERECTOMY  1997    uterine cancer-hx    IR STROKE ALERT  10/20/2021    JOINT REPLACEMENT      b/l   THR    OOPHORECTOMY Bilateral 1997    [place] COLONOSCOPY FLX DX W/COLLJ SPEC WHEN PFRMD N/A 6/14/2017    Procedure: EGD AND COLONOSCOPY;  Surgeon: Britany Palma MD;  Location: [facility]; Service: Gastroenterology    [place] TOTAL HIP ARTHROPLASTY Left 4/13/2016    Procedure: ARTHROPLASTY HIP TOTAL;  Surgeon: Vishnu Warner DO;  Location: [facility];  Service: Orthopedics    TONSILLECTOMY AND ADENOIDECTOMY      TOTAL HIP ARTHROPLASTY Right     LAST ASSESSED: [id number]     Family History   Problem Relation Age of Onset    Heart failure Father     Colon cancer Father 62    Arthritis Family        Occupational History:  Retired dispatcher    Social History:  Social History     Tobacco Use   Smoking Status Former Smoker    Packs/day: 0 50    Years: 50 00    Pack years: 25 00    Types: Cigarettes   Smokeless Tobacco Never Used     Social History     Substance and Sexual Activity   Alcohol Use Yes    Comment: 2 or 3 monthly     Social History     Substance and Sexual Activity   Drug Use No     Marital Status:        Meds/Allergies   Current Facility-Administered Medications   Medication Dose Route Frequency    acetaminophen (TYLENOL) tablet 650 mg  650 mg Oral Q6H PRN    albuterol inhalation solution 2 5 mg  2 5 mg Nebulization Q4H PRN    aspirin (ECOTRIN LOW STRENGTH) EC tablet 81 mg  81 mg Oral Daily    atenolol (TENORMIN) tablet 25 mg  25 mg Oral Daily    atorvastatin (LIPITOR) tablet 40 mg  40 mg Oral QPM    Baricitinib (OLUMIANT) (COVID EUA) tablet 4 mg  4 mg Oral Q24H    benzonatate (TESSALON PERLES) capsule 100 mg  100 mg Oral TID PRN    dexamethasone (DECADRON) injection 9 08 mg  0 1 mg/kg Intravenous Q12H    dornase madiha (PULMOZYME) inhalation solution 2 5 mg  2 5 mg Inhalation BID    FLUoxetine (PROzac) capsule 30 mg  30 mg Oral Daily    gabapentin (NEURONTIN) capsule 300 mg  300 mg Oral TID    heparin (porcine) 25,000 units in 0 45% NaCl 250 mL infusion (premix)  3-30 Units/kg/hr (Order-Specific) Intravenous Titrated    heparin (porcine) injection 3,600 Units  3,600 Units Intravenous Q1H PRN    heparin (porcine) injection 7,200 Units  7,200 Units Intravenous Q1H PRN    ondansetron (ZOFRAN) injection 4 mg  4 mg Intravenous Q6H PRN    pantoprazole (PROTONIX) EC tablet 20 mg  20 mg Oral Daily    traZODone (DESYREL) tablet 25 mg  25 mg Oral HS     (Not in a hospital admission)    Allergies   Allergen Reactions    Percocet [Oxycodone-Acetaminophen] Hives    Amlodipine     Percocet  [Oxycodone-Acetaminophen]        Vitals: Blood pressure 123/83, pulse 84, temperature 98 6 °F (37 °C), temperature source Tympanic, resp  rate 22, SpO2 90 %  , 100% high-flow nasal cannula 55 liters/minute, There is no height or weight on file to calculate BMI        Intake/Output Summary (Last 24 hours) at 1/22/2022 0803  Last data filed at 1/21/2022 1723  Gross per 24 hour   Intake 1150 ml   Output --   Net 1150 ml       Physical exam:    General Appearance:    Alert, cooperative, mild conversational dyspnea or accessory     muscle use       Head/eyes:    Normocephalic, without obvious abnormality, atraumatic,         PERRL, sclera non-injected, nonicteric    Nose:   Nares normal, mucosa normal, no drainage or sinus tenderness   Mouth:   Moist mucous membranes, no thrush, normal dentition   Neck:   Supple, trachea midline, no adenopathy; JVD not elevated   Lungs:     Diminished breath sounds bilateral with coarse inspiratory crackles bilateral, no wheezes appreciated   Chest Wall:    No tenderness or deformity    Heart:    Regular rate and rhythm, S1 and S2 normal, no murmur, rub   or gallop   Abdomen:     Soft, non-tender, bowel sounds active all four quadrants,     no masses, no organomegaly   Extremities:   Extremities normal, atraumatic, no cyanosis or edema   Skin:   Warm, dry, turgor normal, no rashes or lesions   Lymph nodes:   No Cervical or supraclavicular lymphadenopathy   Neurologic:   CNII-XII grossly intact, normal strength, non-focal         Labs:   CBC:   Lab Results   Component Value Date    WBC 9 09 01/22/2022    HGB 13 5 01/22/2022    HCT 40 0 01/22/2022    MCV 96 01/22/2022     (L) 01/22/2022    MCH 32 2 01/22/2022    MCHC 33 8 01/22/2022    RDW 15 0 01/22/2022    MPV 11 3 01/22/2022    NRBC 0 01/21/2022   , CMP:   Lab Results   Component Value Date    SODIUM 130 (L) 01/22/2022    K 4 6 01/22/2022    CL 94 (L) 01/22/2022    CO2 29 01/22/2022    BUN 29 (H) 01/22/2022    CREATININE 1 38 (H) 01/22/2022    CALCIUM 9 3 01/22/2022    AST 34 01/21/2022    ALT 13 01/21/2022    ALKPHOS 71 01/21/2022    EGFR 39 01/22/2022       Imaging and other studies: I have personally reviewed pertinent films in PACS  CT Chest 1/21/22 - Multiple PEs on the right, bilateral ground glass opacities    Pulmonary function testing: None available    Code Status: Level 1 - Full Code    Counseling / Coordination of Care  Total Critical Care time spent 45 minutes excluding procedures, teaching and family updates  Munira Blackman DO      Portions of the record may have been created with voice recognition software  Occasional wrong word or "sound a like" substitutions may have occurred due to the inherent limitations of voice recognition software  Read the chart carefully and recognize, using context, where substitutions have occurred

## 2022-01-23 LAB
ANION GAP SERPL CALCULATED.3IONS-SCNC: 7 MMOL/L (ref 4–13)
APTT PPP: 64 SECONDS (ref 23–37)
BASOPHILS # BLD AUTO: 0.03 THOUSANDS/ΜL (ref 0–0.1)
BASOPHILS NFR BLD AUTO: 0 % (ref 0–1)
BUN SERPL-MCNC: 29 MG/DL (ref 5–25)
CALCIUM SERPL-MCNC: 8.6 MG/DL (ref 8.3–10.1)
CHLORIDE SERPL-SCNC: 101 MMOL/L (ref 100–108)
CO2 SERPL-SCNC: 29 MMOL/L (ref 21–32)
CREAT SERPL-MCNC: 1.18 MG/DL (ref 0.6–1.3)
EOSINOPHIL # BLD AUTO: 0.01 THOUSAND/ΜL (ref 0–0.61)
EOSINOPHIL NFR BLD AUTO: 0 % (ref 0–6)
ERYTHROCYTE [DISTWIDTH] IN BLOOD BY AUTOMATED COUNT: 14.6 % (ref 11.6–15.1)
GFR SERPL CREATININE-BSD FRML MDRD: 48 ML/MIN/1.73SQ M
GLUCOSE SERPL-MCNC: 151 MG/DL (ref 65–140)
HCT VFR BLD AUTO: 37.1 % (ref 34.8–46.1)
HGB BLD-MCNC: 12.3 G/DL (ref 11.5–15.4)
IMM GRANULOCYTES # BLD AUTO: 0.13 THOUSAND/UL (ref 0–0.2)
IMM GRANULOCYTES NFR BLD AUTO: 1 % (ref 0–2)
LYMPHOCYTES # BLD AUTO: 0.87 THOUSANDS/ΜL (ref 0.6–4.47)
LYMPHOCYTES NFR BLD AUTO: 6 % (ref 14–44)
MCH RBC QN AUTO: 32.3 PG (ref 26.8–34.3)
MCHC RBC AUTO-ENTMCNC: 33.2 G/DL (ref 31.4–37.4)
MCV RBC AUTO: 97 FL (ref 82–98)
MONOCYTES # BLD AUTO: 0.76 THOUSAND/ΜL (ref 0.17–1.22)
MONOCYTES NFR BLD AUTO: 5 % (ref 4–12)
NEUTROPHILS # BLD AUTO: 12.16 THOUSANDS/ΜL (ref 1.85–7.62)
NEUTS SEG NFR BLD AUTO: 88 % (ref 43–75)
NRBC BLD AUTO-RTO: 0 /100 WBCS
PLATELET # BLD AUTO: 151 THOUSANDS/UL (ref 149–390)
PMV BLD AUTO: 11.6 FL (ref 8.9–12.7)
POTASSIUM SERPL-SCNC: 4.5 MMOL/L (ref 3.5–5.3)
RBC # BLD AUTO: 3.81 MILLION/UL (ref 3.81–5.12)
SODIUM SERPL-SCNC: 137 MMOL/L (ref 136–145)
WBC # BLD AUTO: 13.96 THOUSAND/UL (ref 4.31–10.16)

## 2022-01-23 PROCEDURE — 80048 BASIC METABOLIC PNL TOTAL CA: CPT | Performed by: EMERGENCY MEDICINE

## 2022-01-23 PROCEDURE — 99233 SBSQ HOSP IP/OBS HIGH 50: CPT | Performed by: INTERNAL MEDICINE

## 2022-01-23 PROCEDURE — 85025 COMPLETE CBC W/AUTO DIFF WBC: CPT | Performed by: EMERGENCY MEDICINE

## 2022-01-23 PROCEDURE — NC001 PR NO CHARGE: Performed by: INTERNAL MEDICINE

## 2022-01-23 PROCEDURE — 85730 THROMBOPLASTIN TIME PARTIAL: CPT | Performed by: INTERNAL MEDICINE

## 2022-01-23 PROCEDURE — 94640 AIRWAY INHALATION TREATMENT: CPT

## 2022-01-23 PROCEDURE — 94760 N-INVAS EAR/PLS OXIMETRY 1: CPT

## 2022-01-23 RX ORDER — LANOLIN ALCOHOL/MO/W.PET/CERES
6 CREAM (GRAM) TOPICAL
Status: DISCONTINUED | OUTPATIENT
Start: 2022-01-23 | End: 2022-01-31 | Stop reason: HOSPADM

## 2022-01-23 RX ADMIN — TRAZODONE HYDROCHLORIDE 25 MG: 50 TABLET ORAL at 20:50

## 2022-01-23 RX ADMIN — PANTOPRAZOLE SODIUM 20 MG: 20 TABLET, DELAYED RELEASE ORAL at 09:04

## 2022-01-23 RX ADMIN — ASPIRIN 81 MG: 81 TABLET, COATED ORAL at 09:04

## 2022-01-23 RX ADMIN — DEXAMETHASONE SODIUM PHOSPHATE 9.08 MG: 4 INJECTION INTRA-ARTICULAR; INTRALESIONAL; INTRAMUSCULAR; INTRAVENOUS; SOFT TISSUE at 19:26

## 2022-01-23 RX ADMIN — ATORVASTATIN CALCIUM 40 MG: 40 TABLET, FILM COATED ORAL at 17:02

## 2022-01-23 RX ADMIN — Medication 6 MG: at 22:47

## 2022-01-23 RX ADMIN — HEPARIN SODIUM 12 UNITS/KG/HR: 10000 INJECTION, SOLUTION INTRAVENOUS at 20:46

## 2022-01-23 RX ADMIN — DORNASE ALFA 2.5 MG: 1 SOLUTION RESPIRATORY (INHALATION) at 19:58

## 2022-01-23 RX ADMIN — GABAPENTIN 300 MG: 300 CAPSULE ORAL at 16:49

## 2022-01-23 RX ADMIN — DORNASE ALFA 2.5 MG: 1 SOLUTION RESPIRATORY (INHALATION) at 07:48

## 2022-01-23 RX ADMIN — GABAPENTIN 300 MG: 300 CAPSULE ORAL at 19:28

## 2022-01-23 RX ADMIN — BARICITINIB 2 MG: 2 TABLET, FILM COATED ORAL at 09:04

## 2022-01-23 RX ADMIN — GABAPENTIN 300 MG: 300 CAPSULE ORAL at 09:04

## 2022-01-23 RX ADMIN — DEXAMETHASONE SODIUM PHOSPHATE 9.08 MG: 4 INJECTION INTRA-ARTICULAR; INTRALESIONAL; INTRAMUSCULAR; INTRAVENOUS; SOFT TISSUE at 09:01

## 2022-01-23 RX ADMIN — FLUOXETINE HYDROCHLORIDE 30 MG: 10 CAPSULE ORAL at 09:09

## 2022-01-23 NOTE — CONSULTS
Oncology Consult Note  Roger Rosa 77 y o  female MRN: [id number]  [facility] Encounter: [id number]      Presenting Complaint:  Prothrombin gene mutation, history DVT, PE, now with hypoxemia, subsegmental pulmonary emboli in the background of possible COVID-19 infection    History of Presenting Illness: The history was obtained from reviewing my previous consultation, discussing with the ICU team and reviewing this current chart    70-year-old  female with history of DVT in 2010 involving the right lower extremity while she was on OCP and active smoking, her mother had a history of DVT    Thrombophilia profile showed heterozygous prothrombin gene mutation she was treated with Lovenox and then Coumadin for 3 months and then discontinue with    In December 2015 she underwent right total hip replacement complicated with small pulmonary embolus in the right lower lobe treated with warfarin    In 02/2016 she underwent elective left hip replacement with IVC filter and keep on Lovenox and later on warfarin for 3 months, IVC filter was removed in 06/2016    She opted to stay on aspirin 81 mg p o  Daily indefinitely    She had a history of hysterectomy, right atrial lipoma, degenerative arthritis, and depression, ulcerative colitis    She was admitted on 01/21/2022 with hypoxemia, dyspnea, COVID-19 test in the ER was negative chest x-ray showed left-sided pneumonia however CTA showed multiple bilateral pulmonary emboli with evidence of right heart strain, cardiomegaly, opacities in bilateral lungs could represent edema versus CHF versus COVID-19 pneumonitis    According to the records patient was taking aspirin 81 mg p o   Daily however apixaban was prescribed on 10/2021 5 mg p o  B i d  after she had a recent stroke on 10/20/2021 MRI of the brain showed acute infarction in the left temporal lobe, rui showed small PFO with right to left shunting    Currently on heparin drip        Review of Systems - As stated in the HPI otherwise the fourteen point review of systems was negative  Past Medical History:   Diagnosis Date    Acid reflux     Anxiety     Arthritis     Back pain     Carpal tunnel syndrome, bilateral     Depression     Endometrial cancer ([facility] [address] ) 1997    Hiatal hernia     History of DVT of lower extremity     Right leg-had IVc filter  removal today-6/6/2016    History of pulmonary embolism     History of uterine cancer     Was stage 3- uterine, ovaries-hysterectomy    Hypercholesteremia     Hypertension     Left leg pain     Lumbar herniated disc     Tumor cells, benign     In right atrium  Probably congenital     Varicose vein of leg     Wears glasses        Social History     Socioeconomic History    Marital status:       Spouse name: None    Number of children: 1    Years of education: None    Highest education level: None   Occupational History    Occupation: retired   Tobacco Use    Smoking status: Former Smoker     Packs/day: 0 50     Years: 50 00     Pack years: 25 00     Types: Cigarettes    Smokeless tobacco: Never Used    Tobacco comment: none   Vaping Use    Vaping Use: Never used   Substance and Sexual Activity    Alcohol use: Yes     Comment: 2 or 3 monthly    Drug use: No    Sexual activity: Not Currently     Partners: Male   Other Topics Concern    None   Social History Narrative    Coffee    Daily caffeinated cola consumption     Social Determinants of Health     Financial Resource Strain: Not on file   Food Insecurity: Not on file   Transportation Needs: Not on file   Physical Activity: Not on file   Stress: Not on file   Social Connections: Not on file   Intimate Partner Violence: Not on file   Housing Stability: Not on file       Family History   Problem Relation Age of Onset    Heart failure Father     Colon cancer Father 62    Arthritis Family        Allergies   Allergen Reactions    Percocet [Oxycodone-Acetaminophen] Hives    Amlodipine  Percocet  [Oxycodone-Acetaminophen]          Current Facility-Administered Medications:     acetaminophen (TYLENOL) tablet 650 mg, 650 mg, Oral, Q6H PRN, Calvin Mendoza MD    albuterol inhalation solution 2 5 mg, 2 5 mg, Nebulization, Q4H PRN, Calvin Mendoza MD    aspirin (ECOTRIN LOW STRENGTH) EC tablet 81 mg, 81 mg, Oral, Daily, Juliette Dover MD, 81 mg at 01/23/22 0904    atenolol (TENORMIN) tablet 25 mg, 25 mg, Oral, Daily, Juliette Dover MD, 25 mg at 01/22/22 1412    atorvastatin (LIPITOR) tablet 40 mg, 40 mg, Oral, QPM, Juliette Dover MD, 40 mg at 01/22/22 2117    Baricitinib (OLUMIANT) (COVID EUA) tablet 2 mg, 2 mg, Oral, Q24H, Linjamie Age, DO, 2 mg at 01/23/22 5187    benzonatate (TESSALON PERLES) capsule 100 mg, 100 mg, Oral, TID PRN, Calvin Mendoza MD    dexamethasone (DECADRON) injection 9 08 mg, 0 1 mg/kg, Intravenous, Q12H, Linjamie Age, DO, 9 08 mg at 01/23/22 0901    dornase madiha (PULMOZYME) inhalation solution 2 5 mg, 2 5 mg, Inhalation, BID, Linjamie Age, DO, 2 5 mg at 01/23/22 0748    FLUoxetine (PROzac) capsule 30 mg, 30 mg, Oral, Daily, Calvin Mendoza MD, 30 mg at 01/23/22 0909    gabapentin (NEURONTIN) capsule 300 mg, 300 mg, Oral, TID, Calvin Mendoza MD, 300 mg at 01/23/22 0904    heparin (porcine) 25,000 units in 0 45% NaCl 250 mL infusion (premix), 3-30 Units/kg/hr (Order-Specific), Intravenous, Titrated, Milta Halsted, DO, Last Rate: 10 8 mL/hr at 01/23/22 0609, 12 Units/kg/hr at 01/23/22 0609    heparin (porcine) injection 3,600 Units, 3,600 Units, Intravenous, Q1H PRN, Milta Halsted, DO    heparin (porcine) injection 7,200 Units, 7,200 Units, Intravenous, Q1H PRN, Milta Halsted, DO    ondansetron (ZOFRAN) injection 4 mg, 4 mg, Intravenous, Q6H PRN, Calvin Mendoza MD    pantoprazole (PROTONIX) EC tablet 20 mg, 20 mg, Oral, Daily, Juliette Dover MD, 20 mg at 01/23/22 0904    traZODone (DESYREL) tablet 25 mg, 25 mg, Oral, HS, Juliette Dover MD, 25 mg at 01/22/22 2117      BP 98/70 Pulse 60   Temp 98 4 °F (36 9 °C) (Oral)   Resp 18   Ht 5' 3" (1 6 m)   Wt 91 kg (200 lb 9 9 oz)   SpO2 96%   BMI 35 54 kg/m²       General Appearance:         Eyes:     Ears:     Nose:    Throat:    Neck:        Lungs:      Chest Wall:      Heart:         Abdomen:              Extremities:        Skin:    Lymph nodes:    Neurologic:        Recent Results (from the past 48 hour(s))   ECG 12 lead    Collection Time: 01/21/22  1:59 PM   Result Value Ref Range    Ventricular Rate 99 BPM    Atrial Rate 99 BPM    CO Interval 128 ms    QRSD Interval 88 ms    QT Interval 388 ms    QTC Interval 497 ms    P [place] 21 degrees    QRS Axis -32 degrees    T Wave Axis -69 degrees   CBC and differential    Collection Time: 01/21/22  2:15 PM   Result Value Ref Range    WBC 10 89 (H) 4 31 - 10 16 Thousand/uL    RBC 4 45 3 81 - 5 12 Million/uL    Hemoglobin 14 4 11 5 - 15 4 g/dL    Hematocrit 43 3 34 8 - 46 1 %    MCV 97 82 - 98 fL    MCH 32 4 26 8 - 34 3 pg    MCHC 33 3 31 4 - 37 4 g/dL    RDW 15 2 (H) 11 6 - 15 1 %    MPV 10 9 8 9 - 12 7 fL    Platelets 371 (L) 024 - 390 Thousands/uL    nRBC 0 /100 WBCs    Neutrophils Relative 75 43 - 75 %    Immat GRANS % 2 0 - 2 %    Lymphocytes Relative 10 (L) 14 - 44 %    Monocytes Relative 11 4 - 12 %    Eosinophils Relative 1 0 - 6 %    Basophils Relative 1 0 - 1 %    Neutrophils Absolute 8 28 (H) 1 85 - 7 62 Thousands/µL    Immature Grans Absolute 0 18 0 00 - 0 20 Thousand/uL    Lymphocytes Absolute 1 09 0 60 - 4 47 Thousands/µL    Monocytes Absolute 1 18 0 17 - 1 22 Thousand/µL    Eosinophils Absolute 0 10 0 00 - 0 61 Thousand/µL    Basophils Absolute 0 06 0 00 - 0 10 Thousands/µL   Basic metabolic panel    Collection Time: 01/21/22  2:15 PM   Result Value Ref Range    Sodium 131 (L) 136 - 145 mmol/L    Potassium 3 3 (L) 3 5 - 5 3 mmol/L    Chloride 94 (L) 100 - 108 mmol/L    CO2 28 21 - 32 mmol/L    ANION GAP 9 4 - 13 mmol/L    BUN 23 5 - 25 mg/dL    Creatinine 1 40 (H) 0 60 - 1 30 mg/dL    Glucose 96 65 - 140 mg/dL    Calcium 9 0 8 3 - 10 1 mg/dL    eGFR 39 ml/min/1 73sq m   HS Troponin 0hr (reflex protocol)    Collection Time: 01/21/22  2:15 PM   Result Value Ref Range    hs TnI 0hr 80 "Refer to ACS Flowchart"- see link ng/L   Magnesium    Collection Time: 01/21/22  2:15 PM   Result Value Ref Range    Magnesium 0 9 (LL) 1 6 - 2 6 mg/dL   Calcium, ionized    Collection Time: 01/21/22  2:15 PM   Result Value Ref Range    Calcium, Ionized 1 00 (L) 1 12 - 1 32 mmol/L   C-reactive protein    Collection Time: 01/21/22  2:15 PM   Result Value Ref Range     0 (H) <3 0 mg/L   Ferritin    Collection Time: 01/21/22  2:15 PM   Result Value Ref Range    Ferritin 121 8 - 388 ng/mL   NT-BNP PRO    Collection Time: 01/21/22  2:15 PM   Result Value Ref Range    NT-proBNP 9,960 (H) <125 pg/mL   Procalcitonin with AM Reflex    Collection Time: 01/21/22  2:15 PM   Result Value Ref Range    Procalcitonin 0 19 <=0 25 ng/ml   CK (with reflex to MB)    Collection Time: 01/21/22  2:15 PM   Result Value Ref Range    Total CK 55 26 - 192 U/L   COVID/FLU/RSV    Collection Time: 01/21/22  2:15 PM    Specimen: Nose; Nares   Result Value Ref Range    SARS-CoV-2 Negative Negative    INFLUENZA A PCR Negative Negative    INFLUENZA B PCR Negative Negative    RSV PCR Negative Negative   Lactic acid, plasma    Collection Time: 01/21/22  2:15 PM   Result Value Ref Range    LACTIC ACID 2 0 0 5 - 2 0 mmol/L   Blood culture #1    Collection Time: 01/21/22  2:15 PM    Specimen: Arm, Left; Blood   Result Value Ref Range    Blood Culture No Growth at 24 hrs  Blood culture #2    Collection Time: 01/21/22  2:15 PM    Specimen: Arm, Right; Blood   Result Value Ref Range    Blood Culture No Growth at 24 hrs      Hepatic function panel    Collection Time: 01/21/22  2:15 PM   Result Value Ref Range    Total Bilirubin 0 76 0 20 - 1 00 mg/dL    Bilirubin, Direct 0 27 (H) 0 00 - 0 20 mg/dL    Alkaline Phosphatase 71 46 - 116 U/L AST 34 5 - 45 U/L    ALT 13 12 - 78 U/L    Total Protein 7 6 6 4 - 8 2 g/dL    Albumin 2 6 (L) 3 5 - 5 0 g/dL   ECG 12 lead    Collection Time: 01/21/22  2:38 PM   Result Value Ref Range    Ventricular Rate 93 BPM    Atrial Rate 93 BPM    TN Interval 134 ms    QRSD Interval 88 ms    QT Interval 394 ms    QTC Interval 489 ms    P Axis 52 degrees    QRS Axis -26 degrees    T Wave Axis -67 degrees   Protime-INR    Collection Time: 01/21/22  3:52 PM   Result Value Ref Range    Protime 28 8 (H) 11 6 - 14 5 seconds    INR 2 89 (H) 0 84 - 1 19   D-dimer, quantitative    Collection Time: 01/21/22  3:52 PM   Result Value Ref Range    D-Dimer, Quant 3 45 (H) <0 50 ug/ml FEU   HS Troponin I 2hr    Collection Time: 01/21/22  3:52 PM   Result Value Ref Range    hs TnI 2hr 99 "Refer to ACS Flowchart"- see link ng/L    Delta 2hr hsTnI 19 ng/L   HS Troponin I 4hr    Collection Time: 01/21/22  7:44 PM   Result Value Ref Range    hs TnI 4hr 81 "Refer to ACS Flowchart"- see link ng/L    Delta 4hr hsTnI 1 ng/L   Strep Pneumoniae, Urine    Collection Time: 01/21/22 10:09 PM    Specimen: Urine, Other   Result Value Ref Range    Strep pneumoniae antigen, urine Negative Negative   Legionella antigen, Urine    Collection Time: 01/21/22 10:09 PM    Specimen: Urine, Other   Result Value Ref Range    Legionella Urinary Antigen Negative Negative   Sputum culture and Gram stain    Collection Time: 01/21/22 11:42 PM    Specimen: Expectorated Sputum   Result Value Ref Range    Gram Stain Result No Epithelial cells seen (A)     Gram Stain Result 2+ Gram positive cocci in clusters (A)     Gram Stain Result 1+ Gram negative rods (A)     Gram Stain Result No polys seen (A)    APTT    Collection Time: 01/21/22 11:45 PM   Result Value Ref Range    PTT >210 (HH) 23 - 37 seconds   Procalcitonin Reflex    Collection Time: 01/22/22  5:24 AM   Result Value Ref Range    Procalcitonin 0 21 <=0 25 ng/ml   APTT    Collection Time: 01/22/22  5:24 AM   Result Value Ref Range     (HH) 23 - 37 seconds   CBC    Collection Time: 01/22/22  5:24 AM   Result Value Ref Range    WBC 9 09 4 31 - 10 16 Thousand/uL    RBC 4 19 3 81 - 5 12 Million/uL    Hemoglobin 13 5 11 5 - 15 4 g/dL    Hematocrit 40 0 34 8 - 46 1 %    MCV 96 82 - 98 fL    MCH 32 2 26 8 - 34 3 pg    MCHC 33 8 31 4 - 37 4 g/dL    RDW 15 0 11 6 - 15 1 %    Platelets 378 (L) 061 - 390 Thousands/uL    MPV 11 3 8 9 - 12 7 fL   Basic metabolic panel    Collection Time: 01/22/22  5:24 AM   Result Value Ref Range    Sodium 130 (L) 136 - 145 mmol/L    Potassium 4 6 3 5 - 5 3 mmol/L    Chloride 94 (L) 100 - 108 mmol/L    CO2 29 21 - 32 mmol/L    ANION GAP 7 4 - 13 mmol/L    BUN 29 (H) 5 - 25 mg/dL    Creatinine 1 38 (H) 0 60 - 1 30 mg/dL    Glucose 136 65 - 140 mg/dL    Calcium 9 3 8 3 - 10 1 mg/dL    eGFR 39 ml/min/1 73sq m   Magnesium    Collection Time: 01/22/22  5:24 AM   Result Value Ref Range    Magnesium 1 7 1 6 - 2 6 mg/dL   Blood gas, arterial    Collection Time: 01/22/22  9:07 AM   Result Value Ref Range    pH, Arterial 7 408 7 350 - 7 450    pCO2, Arterial 42 8 36 0 - 44 0 mm Hg    pO2, Arterial 82 8 75 0 - 129 0 mm Hg    HCO3, Arterial 26 4 22 0 - 28 0 mmol/L    Base Excess, Arterial 1 5 mmol/L    O2 Content, Arterial 18 8 16 0 - 23 0 mL/dL    O2 HGB,Arterial  94 6 94 0 - 97 0 %    SOURCE Radial, Right     Non Vent Type- HFNC HFNC Flow     NV HFNC FIO2 100     HFNC FLOW LPM 55    Echo follow up/limited w/ contrast if indicated    Collection Time: 01/22/22  2:09 PM   Result Value Ref Range    A4C EF 59 %    LVIDd 4 10 5 04 - 7 51 cm    LVIDS 2 70 2 1 - 4 0 cm    IVSd 1 10 cm    LVPWd 1 00 cm    FS 34 28 - 44 %    RVID d 3 7 cm    Triscuspid Valve Regurgitation Peak Gradient 34 0 mmHg    Tricuspid valve peak regurgitation velocity 2 93 m/s    Left ventricular stroke volume (2D) 48 00 mL    LEFT VENTRICLE SYSTOLIC VOLUME (MOD BIPLANE) 2D 27 mL    LV DIASTOLIC VOLUME (MOD BIPLANE) 2D 75 mL    ZLVIDS -4 05 PASP 44 0 mmHg    LV EF 55    APTT    Collection Time: 01/22/22  2:17 PM   Result Value Ref Range    PTT 64 (H) 23 - 37 seconds   APTT    Collection Time: 01/22/22  9:32 PM   Result Value Ref Range    PTT 62 (H) 23 - 37 seconds   APTT    Collection Time: 01/23/22  4:44 AM   Result Value Ref Range    PTT 64 (H) 23 - 37 seconds   CBC and differential    Collection Time: 01/23/22  4:49 AM   Result Value Ref Range    WBC 13 96 (H) 4 31 - 10 16 Thousand/uL    RBC 3 81 3 81 - 5 12 Million/uL    Hemoglobin 12 3 11 5 - 15 4 g/dL    Hematocrit 37 1 34 8 - 46 1 %    MCV 97 82 - 98 fL    MCH 32 3 26 8 - 34 3 pg    MCHC 33 2 31 4 - 37 4 g/dL    RDW 14 6 11 6 - 15 1 %    MPV 11 6 8 9 - 12 7 fL    Platelets 667 500 - 502 Thousands/uL    nRBC 0 /100 WBCs    Neutrophils Relative 88 (H) 43 - 75 %    Immat GRANS % 1 0 - 2 %    Lymphocytes Relative 6 (L) 14 - 44 %    Monocytes Relative 5 4 - 12 %    Eosinophils Relative 0 0 - 6 %    Basophils Relative 0 0 - 1 %    Neutrophils Absolute 12 16 (H) 1 85 - 7 62 Thousands/µL    Immature Grans Absolute 0 13 0 00 - 0 20 Thousand/uL    Lymphocytes Absolute 0 87 0 60 - 4 47 Thousands/µL    Monocytes Absolute 0 76 0 17 - 1 22 Thousand/µL    Eosinophils Absolute 0 01 0 00 - 0 61 Thousand/µL    Basophils Absolute 0 03 0 00 - 0 10 Thousands/µL   Basic metabolic panel    Collection Time: 01/23/22  4:49 AM   Result Value Ref Range    Sodium 137 136 - 145 mmol/L    Potassium 4 5 3 5 - 5 3 mmol/L    Chloride 101 100 - 108 mmol/L    CO2 29 21 - 32 mmol/L    ANION GAP 7 4 - 13 mmol/L    BUN 29 (H) 5 - 25 mg/dL    Creatinine 1 18 0 60 - 1 30 mg/dL    Glucose 151 (H) 65 - 140 mg/dL    Calcium 8 6 8 3 - 10 1 mg/dL    eGFR 48 ml/min/1 73sq m         XR chest 1 view portable    Result Date: 1/21/2022  Narrative: CHEST INDICATION:   sob  Patient has suspected COVID-19  COMPARISON:  11/27/2019 EXAM PERFORMED/VIEWS:  XR CHEST PORTABLE FINDINGS: Cardiomediastinal silhouette appears unremarkable   Relatively diffuse left-sided infiltrate involving the lower three quarters of the lung field which is new from prior study and appears mixed interstitial and alveolar pattern  This is presumably pneumonia  There is very limited inspiration  There is atelectasis at the right base  No pleural fluid or pneumothorax appreciated  There is advanced degenerative arthritis of both glenohumeral joints  There is suggestion of old healed right rib fractures  Impression: Left-sided pneumonia  Recommend follow-up to ensure complete clearing after treatment  Workstation performed: [id number]     MRI lumbar spine wo contrast    Result Date: 12/24/2021  Narrative: MRI LUMBAR SPINE WITHOUT CONTRAST INDICATION: Lower back pain, urinary incontinence, evaluate for cauda equina  COMPARISON:  None  TECHNIQUE:  Sagittal T1, sagittal T2, sagittal inversion recovery, axial T1 and axial T2, coronal T2   IMAGE QUALITY:  Diagnostic FINDINGS: VERTEBRAL BODIES:  There are 5 lumbar type vertebral bodies  Normal alignment of the lumbar spine  No spondylolysis or spondylolisthesis  No scoliosis  No compression fracture  Normal marrow signal is identified within the visualized bony structures  No discrete marrow lesion  SACRUM:  Normal signal within the sacrum  No evidence of insufficiency or stress fracture  DISTAL CORD AND CONUS:  Normal size and signal within the distal cord and conus  PARASPINAL SOFT TISSUES:  Paraspinal soft tissues are unremarkable  LOWER THORACIC DISC SPACES: Multilevel disc desiccation with disc bulges identified at T9-T10 causing mild spinal canal stenosis  Partially visualized superior endplate compression with superior endplate edema of T9 vertebral body  LUMBAR DISC SPACES: L1-L2:  Normal  L2-L3:  Disc bulge with ligamentum flavum hypertrophy causing moderate spinal canal stenosis  Bilateral facet arthrosis  L3-L4:  Disc bulge with ligamentum flavum hypertrophy causing moderate to severe spinal canal stenosis  Bilateral facet arthrosis  L4-L5:  Disc bulge with ligamentum flavum hypertrophy causing moderate to severe spinal canal stenosis  Bilateral facet arthrosis and foraminal disc protrusions causing moderate left neural foraminal narrowing  L5-S1:  Normal      Impression: Multilevel degenerative changes including disc bulges as described above causing multilevel spinal canal stenosis as described above  Partially visualized superior endplate compression with superior endplate edema of T9 vertebral body  Workstation performed: [id number]     CTA ED chest PE study    Result Date: 1/21/2022  Narrative: CTA - CHEST WITH IV CONTRAST - PULMONARY ANGIOGRAM INDICATION:   antithrombin deficiency, elevated D-dimer  COMPARISON: CT chest dated 4/19/2017 of the neck  The TECHNIQUE: CTA examination of the chest was performed using angiographic technique according to a protocol specifically tailored to evaluate for pulmonary embolism  Axial, sagittal, and coronal 2D reformatted images were created from the source data and  submitted for interpretation  In addition, coronal 3D MIP postprocessing was performed on the acquisition scanner  Radiation dose length product (DLP) for this visit:  443 mGy-cm   This examination, like all CT scans performed in the [address] [place] [address], was performed utilizing techniques to minimize radiation dose exposure, including the use of iterative reconstruction and automated exposure control  IV Contrast:  85 mL of iohexol (OMNIPAQUE)  FINDINGS: PULMONARY ARTERIAL TREE:  Partial filling defect in the proximal right main pulmonary artery (axial image 68, series 2) with additional filling defects in several segmental and subsegmental branches in the right middle lobe, right upper lobe, and right lower lobe (axial image 100, 75, and 60, series 2, for example); consistent for multiple pulmonary emboli    Peripheral pulmonary emboli seen in several segmental and subsegmental branches in the left upper lobe as well as partially occlusive thrombus in the distal left main pulmonary artery extending into the left lower lobe segmental and subsegmental branches  LUNGS:  Moderate to severe multiple focal and confluent areas of groundglass and alveolar opacities seen in the bilateral lungs  Mild scattered pulmonary emphysematous changes PLEURA:  Unremarkable  HEART/GREAT VESSELS:  Mild coronary atherosclerosis, tortuous aorta, no aortic aneurysm  Cardiac lipoma, measures approximately 7 cm in maximal dimensions (axial image 160, series 2  Cardiomegaly  Prominence of the right heart chambers suggesting right  heart strain  Mild coronary atherosclerosis  MEDIASTINUM AND HARVINDER:  Shotty mediastinal and hilar lymph nodes, nonspecific, possibly reactive  CHEST WALL AND LOWER NECK:   Multilevel degenerative changes of the spine  Mild S-shaped curvature of the thoracolumbar spine  No acute fracture  VISUALIZED STRUCTURES IN THE UPPER ABDOMEN:  Grossly unremarkable OSSEOUS STRUCTURES:  No acute fracture or destructive osseous lesion  Impression: Multiple bilateral pulmonary emboli, as described with evidence of right heart strain  Cardiomegaly, moderate to severe multiple focal and confluent areas of groundglass and alveolar opacities seen in the bilateral lungs which could represent edema such as in the setting of fluid overload/CHF and/or infectious or inflammatory pneumonitis  Depending on the clinical setting  Correlation with the patient's symptoms, and laboratory values recommended  Mild scattered pulmonary emphysematous changes  Coronary atherosclerosis, shotty mediastinal and hilar lymph nodes, and other findings as above    I personally discussed this study with Dr Sherie Lowry on 1/21/2022 at 10:43 PM  Workstation performed: [id number]     CT abdomen pelvis with contrast    Result Date: 12/24/2021  Narrative: CT ABDOMEN AND PELVIS WITH IV CONTRAST INDICATION:   LLQ abdominal pain LLQ abdominal pain, left sided back pain  COMPARISON:  None  TECHNIQUE:  CT examination of the abdomen and pelvis was performed  Axial, sagittal, and coronal 2D reformatted images were created from the source data and submitted for interpretation  Radiation dose length product (DLP) for this visit:  742 23 mGy-cm   This examination, like all CT scans performed in the [facility], was performed utilizing techniques to minimize radiation dose exposure, including the use of iterative  reconstruction and automated exposure control  IV Contrast:  100 mL of iohexol (OMNIPAQUE) Enteric Contrast:  Enteric contrast was not administered  FINDINGS: ABDOMEN LOWER CHEST: Lipomatous 4 cm lesion seen posterior inferior to the right atrium  No clinically significant abnormality identified in the visualized lower chest  LIVER/BILIARY TREE:  Unremarkable  GALLBLADDER:  No calcified gallstones  No pericholecystic inflammatory change  SPLEEN:  Unremarkable  PANCREAS:  Unremarkable  ADRENAL GLANDS: There is a 1 9 x 1 3 cm left adrenal nodule  Although its imaging features are indeterminate, it does not have suspicious imaging features (heterogeneity, necrosis, irregular margins), therefore this is likely benign, and can be followed by non-contrast abdomen CT or MRI in 12 months  If patient has history of adrenal hyperfunction, consider biochemical evaluation  Adrenal recommendation based on institutional consensus and Journal of [employer] of Radiology 2017;14:[id number] KIDNEYS/URETERS:  Unremarkable  No hydronephrosis  STOMACH AND BOWEL:  There is colonic diverticulosis without evidence of acute diverticulitis  APPENDIX:  No findings to suggest appendicitis  ABDOMINOPELVIC CAVITY:  No ascites  No pneumoperitoneum  No lymphadenopathy  VESSELS:  Unremarkable for patient's age  PELVIS REPRODUCTIVE ORGANS:  Unremarkable for patient's age  URINARY BLADDER:  Unremarkable  ABDOMINAL WALL/INGUINAL REGIONS:  Unremarkable   OSSEOUS STRUCTURES: Streak artifacts from hip prosthesis  No acute fracture or destructive osseous lesion  Impression: No acute intra-abdominal finding  Left adrenal nodule measuring 1 9 x 1 3 cm to be followed up with noncontrast CT or MRI in 12 months  Workstation performed: [email or web address] bedside procedure    Result Date: 1/21/2022  Narrative: 1 [phone] [id number]  2 [id number] 1    Echo follow up/limited w/ contrast if indicated    Addendum Date: 1/22/2022 Addendum:   Rashad Lemme  Left Ventricle: Left ventricular cavity size is normal  The left ventricular ejection fraction is 55%  Systolic function is normal  Wall motion is normal  Diastolic function is normal  Wall thickness is mildly increased    Right Ventricle: Right ventricular cavity size is mildly dilated  Systolic function is mildly reduced    Tricuspid Valve: There is mild regurgitation  The right ventricular systolic pressure is mildly elevated    Pulmonary Artery: The estimated pulmonary artery systolic pressure is 04 2 mmHg  The pulmonary artery systolic pressure is mildly increased  Result Date: 1/22/2022  Narrative: Rashad German  Left Ventricle: Left ventricular cavity size is normal  Systolic function is normal  Wall motion is normal  Diastolic function is normal  Wall thickness is mildly increased    Right Ventricle: Right ventricular cavity size is mildly dilated  Systolic function is mildly reduced    Tricuspid Valve: There is mild regurgitation  The right ventricular systolic pressure is mildly elevated    Pulmonary Artery: The estimated pulmonary artery systolic pressure is 92 7 mmHg  The pulmonary artery systolic pressure is mildly increased       ECOG :  Undetermined      Assessment and plan:    51-year-old  female with history of prothrombin gene mutation, heterozygous diagnosed in 2010 after she had right lower extremity DVT, in 2015 she had pulmonary emboli after surgical procedures on the hip    She underwent successfully left hip replacement in 2016 after IVC filter and staying on Lovenox and subsequent warfarin for 3 months, she had been on aspirin 81 mg p o  Daily until 10/2021 when she was diagnosed with left MCA embolic stroke, echo Doppler showed PFO    Initiated on apixaban 5 mg p o  B i d  As well as aspirin and atorvastatin    She came with hypoxemia, and possible COVID-19 pneumonitis/pneumonia by CT scan, CTA showed bilateral pulmonary emboli with right heart strain, echo Doppler showed pulmonary hypertension about 47 mm Hg no evidence of pericardial effusion    Currently on heparin drip    This is a hypercoagulable status from COVID-19 infection combined with history of prothrombin gene mutation and PFO    I suggest venous Doppler of the lower extremities    Future cardiology consult for consideration of PFO closure    After she stable hemodynamically and able to take oral medication I suggest Pradaxa 150 mg p o  B i d      She has high risk of bleeding as well because of history of ulcerative colitis

## 2022-01-23 NOTE — ED NOTES
Report called to/care transferred to [facility]        Tiara Edwards RN  01/22/22 1928

## 2022-01-23 NOTE — PROGRESS NOTES
Progress Note - Critical Care   Broderick Lackey 77 y o  female MRN: [id number]  Unit/Bed#: [facility] 208-01 Encounter: [id number]    Assessment:  Acute hypoxic respiratory failure/COVID pneumonia/history of PE with prothrombin gene mutation/CVA/hypertension    Plan:          Neuro:  Continue patient's home dose of Neurontin t i d  And trazodone at bedtime and Prozac                 CV:  Continue on atorvastatin 40 daily patient's home atenolol 25 daily aspirin                 Lung:  Patient requiring 100% high-flow and non-rebreather still saturating in the low 90s  She does have an element of right heart strain probably from her acute PE on top for severe V/Q mismatch  From a risk to benefit standpoint I would hesitate to do tPA, watch closely for signs of decompensation  Most of patient's PEs are peripheral unlikely to benefit from IR treatment  Continue dornase twice a day for 5 days                 GI:  Continue Protonix daily and diet as tolerated                 FEN:  Patient's creatinine is improving, no need for further resuscitation                 :  Monitor BUN and creatinine                 ID:  Continue COVID algorithm with baricitinib day 2 high-dose Decadron day 2                 Heme:  Heparin drip in the setting of possible intervention, failure on Eliquis will ask Hematology to see patient                 Endo:  Blood sugars less than 180                            Msk/Skin:  Nursing interventions                 Disposition:  Continue step-down level 1    Chief Complaint:  Patient resting comfortably    HPI/24hr events:  Patient is sleeping but arousable able to answer questions she denies much shortness of breath and denies any pain      Physical Exam:  Neuro sleeping but arousable heart is regular lungs clear abdomen soft extremities show no edema neurologic exam is nonfocal    Vitals:    01/23/22 0342 01/23/22 0541 01/23/22 0700 01/23/22 0749   BP: 91/60      BP Location: Left arm      Pulse: 68      Resp: 18      Temp:   98 4 °F (36 9 °C)    TempSrc:   Oral    SpO2: 95%   97%   Weight:  91 kg (200 lb 9 9 oz)     Height:                 Temperature:   Temp (24hrs), Av 6 °F (37 °C), Min:98 4 °F (36 9 °C), Max:98 8 °F (37 1 °C)    Current: Temperature: 98 4 °F (36 9 °C)    Weights:        Body mass index is 35 54 kg/m²  Weight (last 2 days)     Date/Time Weight    22 0541 91 (200 62)    22 91 (200 62)    22 190 91 (200 62)    22 1357 90 7 (200)          Hemodynamic Monitoring:  N/A     Non-Invasive/Invasive Ventilation Settings:  Respiratory  Report   Lab Data (Last 4 hours)    None         O2/Vent Data (Last 4 hours)       0749          Non-Invasive Ventilation Mode HFNC (High flow)                 Lab Results   Component Value Date    PHART 7 408 2022    KIQ0DPQ 42 8 2022    PO2ART 82 8 2022    QOZ7BEA 26 4 2022    BEART 1 5 2022    SOURCE Radial, Right 2022     SpO2: SpO2: 97 %    Intake and Outputs:  I/O        0701   0700  0701   0700  0701   0700    I V  (mL/kg) 1000 125 1 (1 4)     IV Piggyback 150      Total Intake(mL/kg) 1150 125 1 (1 4)     Urine (mL/kg/hr)  500 (0 2)     Total Output  500     Net +1150 -374 9                Nutrition:        Diet Orders   (From admission, onward)             Start     Ordered    22  Diet Cardiovascular; Lo Cholesterol  Diet effective now        References:    Nutrtion Support Algorithm Enteral vs  Parenteral   Question Answer Comment   Diet Type Cardiovascular    Cardiac Lo Cholesterol    RD to adjust diet per protocol?  Yes        22                Labs:   Results from last 7 days   Lab Units 22  0449 22  0524 22  1415   WBC Thousand/uL 13 96* 9 09 10 89*   HEMOGLOBIN g/dL 12 3 13 5 14 4   HEMATOCRIT % 37 1 40 0 43 3   PLATELETS Thousands/uL 151 127* 137*   NEUTROS PCT % 88*  --  75   MONOS PCT % 5  --  11      Results from last 7 days   Lab Units 01/23/22  0449 01/22/22  0524 01/21/22  1415   SODIUM mmol/L 137 130* 131*   POTASSIUM mmol/L 4 5 4 6 3 3*   CHLORIDE mmol/L 101 94* 94*   CO2 mmol/L 29 29 28   BUN mg/dL 29* 29* 23   CREATININE mg/dL 1 18 1 38* 1 40*   CALCIUM mg/dL 8 6 9 3 9 0   ALK PHOS U/L  --   --  71   ALT U/L  --   --  13   AST U/L  --   --  34     Results from last 7 days   Lab Units 01/22/22  0524 01/21/22  1415   MAGNESIUM mg/dL 1 7 0 9*          Results from last 7 days   Lab Units 01/23/22  0444 01/22/22  2132 01/22/22  1417 01/21/22  2345 01/21/22  1552   INR   --   --   --   --  2 89*   PTT seconds 64* 62* 64*   < >  --     < > = values in this interval not displayed  Results from last 7 days   Lab Units 01/21/22  1415   LACTIC ACID mmol/L 2 0     0   Lab Value Date/Time    TROPONINI <0 02 10/20/2021 1852       Imaging: I have personally reviewed pertinent films in PACS    Micro:  Lab Results   Component Value Date    BLOODCX No Growth at 24 hrs  01/21/2022    BLOODCX No Growth at 24 hrs  01/21/2022    WOUNDCULT No growth 04/11/2017       Allergies:    Allergies   Allergen Reactions    Percocet [Oxycodone-Acetaminophen] Hives    Amlodipine     Percocet  [Oxycodone-Acetaminophen]        Medications:   Scheduled Meds:  Current Facility-Administered Medications   Medication Dose Route Frequency Provider Last Rate    acetaminophen  650 mg Oral Q6H PRN Anushka Parks MD      albuterol  2 5 mg Nebulization Q4H PRN Anushka Parks MD      aspirin  81 mg Oral Daily Anushka Parks MD      atenolol  25 mg Oral Daily Anushka Parks MD      atorvastatin  40 mg Oral QPM Anushka Parks MD      Baricitinib  2 mg Oral Q24H Pollard Dessert, DO      benzonatate  100 mg Oral TID PRN Anushka Parks MD      dexamethasone  0 1 mg/kg Intravenous Q12H Pollard Dessert, DO      dornase madiha  2 5 mg Inhalation BID Pollard Dessert, DO      FLUoxetine  30 mg Oral Daily Anushka Parks MD      gabapentin  300 mg Oral TID Anushka Parks MD  heparin (porcine)  3-30 Units/kg/hr (Order-Specific) Intravenous Titrated Corine Rajeev, DO 12 Units/kg/hr (01/23/22 4049)    heparin (porcine)  3,600 Units Intravenous Q1H PRN Corine Rajeev, DO      heparin (porcine)  7,200 Units Intravenous Q1H PRN Corine Rajeev, DO      ondansetron  4 mg Intravenous Q6H PRN Ricardo Brian MD      pantoprazole  20 mg Oral Daily Graciella Roc, MD      traZODone  25 mg Oral HS Juliette Dover MD       Continuous Infusions:heparin (porcine), 3-30 Units/kg/hr (Order-Specific), Last Rate: 12 Units/kg/hr (01/23/22 0609)      PRN Meds:  acetaminophen, 650 mg, Q6H PRN  albuterol, 2 5 mg, Q4H PRN  benzonatate, 100 mg, TID PRN  heparin (porcine), 3,600 Units, Q1H PRN  heparin (porcine), 7,200 Units, Q1H PRN  ondansetron, 4 mg, Q6H PRN        VTE Pharmacologic Prophylaxis: Heparin  VTE Mechanical Prophylaxis: sequential compression device    Invasive lines and devices: Invasive Devices  Report    Peripheral Intravenous Line            Peripheral IV 01/21/22 Left Antecubital 1 day    Peripheral IV 01/22/22 Right Antecubital 1 day                   Code Status: Level 1 - Full Code     Portions of the record may have been created with voice recognition software  Occasional wrong word or "sound a like" substitutions may have occurred due to the inherent limitations of voice recognition software  Read the chart carefully and recognize, using context, where substitutions have occurred       Chantal Knight DO

## 2022-01-23 NOTE — PLAN OF CARE
Problem: Potential for Falls  Goal: Patient will remain free of falls  Description: INTERVENTIONS:  - Educate patient/family on patient safety including physical limitations  - Instruct patient to call for assistance with activity   - Consult OT/PT to assist with strengthening/mobility   - Keep Call bell within reach  - Keep bed low and locked with side rails adjusted as appropriate  - Keep care items and personal belongings within reach  - Initiate and maintain comfort rounds  - Make Fall Risk Sign visible to staff  Outcome: Progressing

## 2022-01-24 ENCOUNTER — APPOINTMENT (INPATIENT)
Dept: NON INVASIVE DIAGNOSTICS | Facility: HOSPITAL | Age: 67
DRG: 177 | End: 2022-01-24
Payer: COMMERCIAL

## 2022-01-24 LAB
APTT PPP: 61 SECONDS (ref 23–37)
BACTERIA SPT RESP CULT: ABNORMAL
GRAM STN SPEC: ABNORMAL

## 2022-01-24 PROCEDURE — 85730 THROMBOPLASTIN TIME PARTIAL: CPT | Performed by: INTERNAL MEDICINE

## 2022-01-24 PROCEDURE — 94760 N-INVAS EAR/PLS OXIMETRY 1: CPT

## 2022-01-24 PROCEDURE — 94640 AIRWAY INHALATION TREATMENT: CPT

## 2022-01-24 PROCEDURE — 99233 SBSQ HOSP IP/OBS HIGH 50: CPT | Performed by: INTERNAL MEDICINE

## 2022-01-24 PROCEDURE — 93970 EXTREMITY STUDY: CPT | Performed by: SURGERY

## 2022-01-24 PROCEDURE — 93970 EXTREMITY STUDY: CPT

## 2022-01-24 RX ORDER — FLUTICASONE FUROATE AND VILANTEROL 100; 25 UG/1; UG/1
1 POWDER RESPIRATORY (INHALATION) DAILY
Status: DISCONTINUED | OUTPATIENT
Start: 2022-01-24 | End: 2022-01-31 | Stop reason: HOSPADM

## 2022-01-24 RX ORDER — FUROSEMIDE 10 MG/ML
20 INJECTION INTRAMUSCULAR; INTRAVENOUS
Status: DISCONTINUED | OUTPATIENT
Start: 2022-01-24 | End: 2022-01-27

## 2022-01-24 RX ORDER — FUROSEMIDE 10 MG/ML
40 INJECTION INTRAMUSCULAR; INTRAVENOUS
Status: DISCONTINUED | OUTPATIENT
Start: 2022-01-24 | End: 2022-01-24

## 2022-01-24 RX ADMIN — GABAPENTIN 300 MG: 300 CAPSULE ORAL at 21:16

## 2022-01-24 RX ADMIN — BARICITINIB 2 MG: 2 TABLET, FILM COATED ORAL at 08:02

## 2022-01-24 RX ADMIN — ASPIRIN 81 MG: 81 TABLET, COATED ORAL at 08:02

## 2022-01-24 RX ADMIN — DORNASE ALFA 2.5 MG: 1 SOLUTION RESPIRATORY (INHALATION) at 20:31

## 2022-01-24 RX ADMIN — DEXAMETHASONE SODIUM PHOSPHATE 9.08 MG: 4 INJECTION INTRA-ARTICULAR; INTRALESIONAL; INTRAMUSCULAR; INTRAVENOUS; SOFT TISSUE at 20:46

## 2022-01-24 RX ADMIN — DEXAMETHASONE SODIUM PHOSPHATE 9.08 MG: 4 INJECTION INTRA-ARTICULAR; INTRALESIONAL; INTRAMUSCULAR; INTRAVENOUS; SOFT TISSUE at 08:03

## 2022-01-24 RX ADMIN — ATORVASTATIN CALCIUM 40 MG: 40 TABLET, FILM COATED ORAL at 17:00

## 2022-01-24 RX ADMIN — GABAPENTIN 300 MG: 300 CAPSULE ORAL at 08:03

## 2022-01-24 RX ADMIN — Medication 6 MG: at 21:16

## 2022-01-24 RX ADMIN — DORNASE ALFA 2.5 MG: 1 SOLUTION RESPIRATORY (INHALATION) at 08:58

## 2022-01-24 RX ADMIN — GABAPENTIN 300 MG: 300 CAPSULE ORAL at 17:00

## 2022-01-24 RX ADMIN — FUROSEMIDE 20 MG: 10 INJECTION, SOLUTION INTRAMUSCULAR; INTRAVENOUS at 17:00

## 2022-01-24 RX ADMIN — HEPARIN SODIUM 12 UNITS/KG/HR: 10000 INJECTION, SOLUTION INTRAVENOUS at 23:44

## 2022-01-24 RX ADMIN — TRAZODONE HYDROCHLORIDE 25 MG: 50 TABLET ORAL at 21:16

## 2022-01-24 RX ADMIN — FLUOXETINE HYDROCHLORIDE 30 MG: 10 CAPSULE ORAL at 21:16

## 2022-01-24 RX ADMIN — ATENOLOL 25 MG: 25 TABLET ORAL at 08:30

## 2022-01-24 RX ADMIN — FLUTICASONE FUROATE AND VILANTEROL TRIFENATATE 1 PUFF: 100; 25 POWDER RESPIRATORY (INHALATION) at 11:41

## 2022-01-24 RX ADMIN — PANTOPRAZOLE SODIUM 20 MG: 20 TABLET, DELAYED RELEASE ORAL at 08:02

## 2022-01-24 NOTE — PROGRESS NOTES
Progress Note - Critical Care   Jordan Edmonds 77 y o  female MRN: [id number]  [facility] Encounter: [id number]    SUBJECTIVE :  Patient continues to have yellow productive coughs  No other complaints  HPI/24HR Event:  No acute overnight events  ROS  Denies chest pain, abdominal pain, SOB, palpitations, dizziness, lightheadedness, fevers, chills  Invasive lines and devices: Invasive Devices  Report    Peripheral Intravenous Line            Peripheral IV 22 Left Antecubital 2 days    Peripheral IV 22 Right Antecubital 2 days                   Physical exam  General: Alert and oriented x3  Resting comfortably in bed in no acute distress  Neuro: Alert and oriented x3  HENT: Normocephalic and atraumatic, PERRL  Heart: S1,S2, RRR, pulses intact  Lungs: Bilateral breath sounds present, crackles at bases b/l  Mild wheezes    Abdomen: NT/ND, Bowel sounds present, soft  Skin:  Warm, dry skin/Incision site clean dry and intact    Vitals  Temperature:   Temp (24hrs), Av 8 °F (36 6 °C), Min:97 5 °F (36 4 °C), Max:98 1 °F (36 7 °C)    Current: Temperature: 98 °F (36 7 °C)    Vitals:    22 0829 22 0841 22 0858 22 0903   BP: 122/77      BP Location:       Pulse: 72      Resp:       Temp: 98 °F (36 7 °C)      TempSrc:       SpO2: 95% 99% 95% 93%   Weight:       Height:                 Labs:   Results from last 7 days   Lab Units 22  04422  0524 22  1415   WBC Thousand/uL 13 96* 9 09 10 89*   HEMOGLOBIN g/dL 12 3 13 5 14 4   HEMATOCRIT % 37 1 40 0 43 3   PLATELETS Thousands/uL 151 127* 137*   NEUTROS PCT % 88*  --  75   MONOS PCT % 5  --  11      Results from last 7 days   Lab Units 22  04422  0524 22  1415   SODIUM mmol/L 137 130* 131*   POTASSIUM mmol/L 4 5 4 6 3 3*   CHLORIDE mmol/L 101 94* 94*   CO2 mmol/L 29 29 28   BUN mg/dL 29* 29* 23   CREATININE mg/dL 1 18 1 38* 1 40*   CALCIUM mg/dL 8 6 9 3 9 0   ALK PHOS U/L  -- --  71   ALT U/L  --   --  13   AST U/L  --   --  34     Results from last 7 days   Lab Units 01/22/22  0524 01/21/22  1415   MAGNESIUM mg/dL 1 7 0 9*          Results from last 7 days   Lab Units 01/24/22  0459 01/23/22  0444 01/22/22  2132 01/21/22  2345 01/21/22  1552   INR   --   --   --   --  2 89*   PTT seconds 61* 64* 62*   < >  --     < > = values in this interval not displayed  Results from last 7 days   Lab Units 01/21/22  1415   LACTIC ACID mmol/L 2 0       Other Labs    Intake and Outputs:  I/O       01/22 0701  01/23 0700 01/23 0701  01/24 0700 01/24 0701  01/25 0700    P  O   420     I V  (mL/kg) 125 1 (1 4)  274 1 (3)    IV Piggyback       Total Intake(mL/kg) 125 1 (1 4) 420 (4 6) 274 1 (3)    Urine (mL/kg/hr) 500 (0 2) 1900 (0 9) 150 (0 3)    Total Output 500 1900 150    Net -374 9 -1480 +124 1                 Imaging Studies      Assessment & Plan:   Patient is a 69F with hx of prothrombin gene mutation on Eliquis, hx of PE, hx of CVA, GERD, HTN, depressive disorder, dyslipidemia who is under critical care service for acute hypoxic respiratory failure, COVID pneumonia, bilateral pulmonary embolism with right ventricular strain (likely 2/2 COVID PNA), volume overload, CHAKA  -Neuro:   Dx:  Hx stroke (on eliquis)  - continue heparin drip   - hold eliquis  - No sedation  - Delirium ppx: CAM-ICU, sleep hygiene      CV:   - Dx: Volume overload, elevated BNP, possibly acute diastolic CHF EF 95%  - RV strain seen on CT but not on echocardiogram  - Hemodynamic support: none  - echo in 3-6 months  - MAP goal > 65   - will continue diuresis lasix 20 mg IV BID   - monitor I/O       Lung:   - Dx: Acute hypoxic respiratory failure, multifactorial 2/2 COVID PNA, bilateral PE with RV strain, possible diastolic CHF; suspected COPD   - on HFNC 100% 60L  - continue COVID severe pathway  - CT with R heart strain, not seen on echo  - Continue diuresis IV lasix   - Heparin drip for PE -> transition to pradaxa later for anticoagulation, appreciate hematology recommendations  - LE duplex :   - Breo and PRN albuterol inhalers   - plan to obtain outpatient PFT   - Continue Respiratory Protocol       GI:   - Dx: none  - on protonix      FEN:   - Fluids: no maintenance fluids  - Electrolytes: trend and replete as needed Mg>2, phos>3, K>4  - Nutrition: Continue cardiovascular diet      :   - Dx: CHAKA - resolving   - Cr 1 04 this AM  - continue diuresis lasix 20 mg IV BID  - I/O last 24 hours: In: 694 1 [P O :420; I V :274 1]  Out: 2050 [Urine:2050]  - no white    ID:   - Dx: COVID PNA  - WBC: 11 06 improved  - Temp: afebrile  - Abx: none  - Cultures:  Sputum culture: with 2+ mixed respiratory cecilia, gm stain sputum culture: with 2+ g positive cocci in clusters and 1+ Gram-negative rods, Covid/flu/RSV negative; blood cx: neg x2 72h  - Monitor WBC/temp curve    Heme:   - Dx: PE with Right heart strain afte COVID infection,  Prothrombin Gene mutation, history of stroke   - cn't heparin drip  - DVT ppx: heparin drip, SCDs   - later on consider apixaban 5 mg PO BID for stroke with BRAYDEN showing small PFO with R->L shunt, appreciate Heme/Onc recommendations      Endo:   - Dx: none  - at goal  - Goal -180    Msk/Skin:   - PT/OT  - Turning/repositioning    Disposition: SD1  Non-Invasive/Invasive Ventilation Settings:  Respiratory  Report   Lab Data (Last 4 hours)    None         O2/Vent Data (Last 4 hours)    None              No results found for: PHART, UMR0QYX, PO2ART, ZHW1TWG, R3POXNXU, BEART, SOURCE       Imaging:   CTA ED chest PE study   Final Result by Ronald Gilliam DO (01/21 3468)      Multiple bilateral pulmonary emboli, as described with evidence of right heart strain        Cardiomegaly, moderate to severe multiple focal and confluent areas of groundglass and alveolar opacities seen in the bilateral lungs which could represent edema such as in the setting of fluid overload/CHF and/or infectious or inflammatory pneumonitis  Depending on the clinical setting  Correlation with the patient's symptoms, and laboratory values recommended  Mild scattered pulmonary emphysematous changes  Coronary atherosclerosis, shotty mediastinal and hilar lymph nodes, and other findings as above  I personally discussed this study with Dr Estefania Petty on 1/21/2022 at 10:43 PM                Workstation performed: [id number]         XR chest 1 view portable   ED Interpretation by Lisa Ramon MD (01/21 0717)   LEFT lower lobe PNA  Final Result by Sam Edgar MD (01/21 9640)      Left-sided pneumonia  Recommend follow-up to ensure complete clearing after treatment  Workstation performed: [id number]         VAS lower limb venous duplex study, complete bilateral    (Results Pending)     I have personally reviewed pertinent reports  Micro:  Lab Results   Component Value Date    BLOODCX No Growth at 48 hrs  01/21/2022    BLOODCX No Growth at 48 hrs  01/21/2022    WOUNDCULT No growth 04/11/2017    SPUTUMCULTUR 2+ Growth of  01/21/2022         Allergies:    Allergies   Allergen Reactions    Percocet [Oxycodone-Acetaminophen] Hives    Amlodipine     Percocet  [Oxycodone-Acetaminophen]          Medications:   Scheduled Meds:  Current Facility-Administered Medications   Medication Dose Route Frequency Provider Last Rate    acetaminophen  650 mg Oral Q6H PRN Natalia Mcclendon MD      albuterol  2 5 mg Nebulization Q4H PRN Natalia Mcclendon MD      aspirin  81 mg Oral Daily Natalia Mcclendon MD      atenolol  25 mg Oral Daily Natalia Mcclendon MD      atorvastatin  40 mg Oral QPM Natalia Mcclendon MD      Baricitinib  2 mg Oral Q24H Estebanstciarra Yates, DO      benzonatate  100 mg Oral TID PRN Natalia Mcclendon MD      dexamethasone  0 1 mg/kg Intravenous Q12H Frosty , DO      dornase madiha  2 5 mg Inhalation BID Frostciarra Yates, DO      FLUoxetine  30 mg Oral Daily Natalia Mcclendon MD      fluticasone-vilanterol 1 puff Inhalation Daily Tiffanie Canales MD      furosemide  20 mg Intravenous BID (diuretic) Tiffanie Canales MD      gabapentin  300 mg Oral TID Neetu Aguilar MD      heparin (porcine)  3-30 Units/kg/hr (Order-Specific) Intravenous Titrated Zachariah Floresita, DO 12 Units/kg/hr (01/23/22 2046)    heparin (porcine)  3,600 Units Intravenous Q1H PRN Zachariah Floresita, DO      heparin (porcine)  7,200 Units Intravenous Q1H PRN Zachariah Floresita, DO      melatonin  6 mg Oral HS Ministerio Patel MD      ondansetron  4 mg Intravenous Q6H PRN Neetu Aguilar MD      pantoprazole  20 mg Oral Daily Neetu Aguilar MD      traZODone  25 mg Oral HS Neetu Aguilar MD       Continuous Infusions:heparin (porcine), 3-30 Units/kg/hr (Order-Specific), Last Rate: 12 Units/kg/hr (01/23/22 2046)      PRN Meds:  acetaminophen, 650 mg, Q6H PRN  albuterol, 2 5 mg, Q4H PRN  benzonatate, 100 mg, TID PRN  heparin (porcine), 3,600 Units, Q1H PRN  heparin (porcine), 7,200 Units, Q1H PRN  ondansetron, 4 mg, Q6H PRN        Counseling / Coordination of Care  Total Critical Care time spent 25 minutes excluding procedures, teaching and family updates  Code Status: Level 1 - Full Code     Portions of the record may have been created with voice recognition software  Occasional wrong word or "sound a like" substitutions may have occurred due to the inherent limitations of voice recognition software  Read the chart carefully and recognize, using context, where substitutions have occurred       Wilma Moyer MD  Internal Medicine Resident, PGY1  1:19 PM

## 2022-01-24 NOTE — PROGRESS NOTES
Progress Note - Critical Care   Mort Seats 77 y o  female MRN: [id number]  Unit/Bed#: [facility] 208-01 Encounter: [id number]    Assessment:   1  Acute hypoxic respiratory failure, multifactorial  2  COVID-19 pneumonia POA  3  Bilateral PEs secondary to COVID-19 pneumonia with RV strain  4  Mild pulmonary hypertension could be in the setting of acute PE but also to consider other factors  5  Prothrombin gene mutation, was on Eliquis prior to admission due to recent stroke  6  History of DVT/PE  7  Stroke while on aspirin, was treated with Eliquis prior to admission  8  Possible acute diastolic CHF with mild volume overload  9  Former heavy smoking history, suspect underlying COPD without acute exacerbation  10  Improved CHAKA    Plan:   · Continue oxygen titrate for pulse ox more than 88%  · Encourage out of bed as tolerated and continue incentive spirometry and self proning  · Continue management as per COVID-19 protocol with baricitinib and dexamethasone  · Diurese with IV Lasix  · Start inhalers with Breo and continue p r n  Albuterol, outpatient PFT in the future  · Continue heparin infusion, will transition to Pradaxa at some point for anticoagulation, appreciate Hematology input  · Monitor BMP while on Lasix  · Check lower extremity duplex as per Hematology  · Echocardiogram in 3-6 months    Critical Care Time: 0  Documented critical care time excludes any procedures documented elsewhere  It also excludes any family updates    _____________________________________________________________________    HPI/24hr events:   Patient feels slightly better, continues to have dyspnea on exertion, denies chest pain or pleurisy, minimal cough, afebrile      Medications:  Current Facility-Administered Medications   Medication Dose Route Frequency Provider Last Rate    acetaminophen  650 mg Oral Q6H PRN Radha Byers MD      albuterol  2 5 mg Nebulization Q4H PRN Radha Byers MD      aspirin  81 mg Oral Daily Radha Byers MD  atenolol  25 mg Oral Daily Juan R Ramirez MD      atorvastatin  40 mg Oral QPM Juan R Ramirez MD      Baricitinib  2 mg Oral Q24H Arianne Jay DO      benzonatate  100 mg Oral TID PRN Juan R Ramirez MD      dexamethasone  0 1 mg/kg Intravenous Q12H Arianne Jay,       dornase madiha  2 5 mg Inhalation BID Arianne Jay,       FLUoxetine  30 mg Oral Daily Juan R Ramirez MD      gabapentin  300 mg Oral TID Juan R Ramirez MD      heparin (porcine)  3-30 Units/kg/hr (Order-Specific) Intravenous Titrated Patti Hazel Green, DO 12 Units/kg/hr (01/23/22 2046)    heparin (porcine)  3,600 Units Intravenous Q1H PRN Patti Hazel Green, DO      heparin (porcine)  7,200 Units Intravenous Q1H PRN Patti Hazel Green, DO      melatonin  6 mg Oral HS Sera Swann MD      ondansetron  4 mg Intravenous Q6H PRN Juan R Ramirez MD      pantoprazole  20 mg Oral Daily Juan R Ramirez MD      traZODone  25 mg Oral HS Juan R Ramirez MD         heparin (porcine), 3-30 Units/kg/hr (Order-Specific), Last Rate: 12 Units/kg/hr (01/23/22 2046)          Physical exam:  Vitals:   Vitals:    01/23/22 2000 01/24/22 0000 01/24/22 0300 01/24/22 0500   BP:  94/60 109/71    BP Location:  Right arm Right arm    Pulse:  72 72    Resp:  16 18    Temp:  97 5 °F (36 4 °C) 98 1 °F (36 7 °C)    TempSrc:  Oral Oral    SpO2: 95% 93% 95%    Weight:    90 8 kg (200 lb 2 8 oz)   Height:         Temp  Min: 97 5 °F (36 4 °C)  Max: 98 8 °F (37 1 °C)       SpO2: 95 %  SpO2 Activity: At Rest  O2 Device: High flow nasal cannula      Intake/Output Summary (Last 24 hours) at 1/24/2022 0819  Last data filed at 1/24/2022 0745  Gross per 24 hour   Intake 420 ml   Output 2050 ml   Net -1630 ml       Invasive/non-invasive ventilation settings:   Respiratory  Report   Lab Data (Last 4 hours)    None         O2/Vent Data (Last 4 hours)    None              Invasive Devices  Report    Peripheral Intravenous Line            Peripheral IV 01/21/22 Left Antecubital 2 days    Peripheral IV 01/22/22 Right Antecubital 2 days                  Physical Exam:  General: alert, not in acute distress  HEENT: PERRL, no icteric sclera or cyanosis, no thrush  Neck:  Supple, no lymphadenopathy or thyromegaly, no JVD  Lungs:  Equal breath sounds, bibasilar crackles, by lateral mild wheezing anteriorly  Heart: S1S2 regular, no murmures or gallops  Abdomen: soft, non-tender, bowel sounds  present  Extrimities:  Trace edema, no clubbing or cyanosis  Neuro: Alert and oriented x 3, no focal neurodeficits   Skin: intact, no rashes      Diagnostic Data:  Lab: I have personally reviewed pertinent lab results  CBC:   Results from last 7 days   Lab Units 01/23/22  0449 01/22/22  0524 01/21/22  1415   WBC Thousand/uL 13 96* 9 09 10 89*   HEMOGLOBIN g/dL 12 3 13 5 14 4   HEMATOCRIT % 37 1 40 0 43 3   PLATELETS Thousands/uL 151 127* 137*       CMP:   Results from last 7 days   Lab Units 01/23/22  0449 01/22/22  0524 01/21/22  1415   POTASSIUM mmol/L 4 5 4 6 3 3*   CHLORIDE mmol/L 101 94* 94*   CO2 mmol/L 29 29 28   BUN mg/dL 29* 29* 23   CREATININE mg/dL 1 18 1 38* 1 40*   CALCIUM mg/dL 8 6 9 3 9 0   ALK PHOS U/L  --   --  71   ALT U/L  --   --  13   AST U/L  --   --  34     PT/INR:   No results found for: PT, INR,   Magnesium:   Results from last 7 days   Lab Units 01/22/22  0524 01/21/22  1415   MAGNESIUM mg/dL 1 7 0 9*     Phosphorous:       Microbiology:  Results from last 7 days   Lab Units 01/21/22  2342 01/21/22  2209 01/21/22  1415   BLOOD CULTURE   --   --  No Growth at 48 hrs  No Growth at 48 hrs  SPUTUM CULTURE  2+ Growth of   --   --    GRAM STAIN RESULT  No Epithelial cells seen*  2+ Gram positive cocci in clusters*  1+ Gram negative rods*  No polys seen*  --   --    LEGIONELLA URINARY ANTIGEN   --  Negative  --         Imaging:  Chest CT scan reviewed on PACs:  Multiple bilateral PEs, multiple areas of ground-glass opacification bilaterally with possible volume overload as well      Cardiac lab/EKG/telemetry/ECHO:   ECHOCARDIOGRAM:  LVEF 55%, MILDLY DILATED RV WITH MILDLY REDUCED SYSTOLIC FUNCTION, ESTIMATED PEAK PA PRESSURE 44    VTE Prophylaxis:  On heparin infusion    Code Status: Level 1 - Full Code    Salvador Ramos MD    Portions of the record may have been created with voice recognition software  Occasional wrong word or "sound a like" substitutions may have occurred due to the inherent limitations of voice recognition software  Read the chart carefully and recognize, using context, where substitutions have occurred

## 2022-01-25 LAB
ANION GAP SERPL CALCULATED.3IONS-SCNC: 4 MMOL/L (ref 4–13)
APTT PPP: 52 SECONDS (ref 23–37)
APTT PPP: 58 SECONDS (ref 23–37)
APTT PPP: 89 SECONDS (ref 23–37)
BASOPHILS # BLD AUTO: 0.01 THOUSANDS/ΜL (ref 0–0.1)
BASOPHILS NFR BLD AUTO: 0 % (ref 0–1)
BUN SERPL-MCNC: 30 MG/DL (ref 5–25)
CALCIUM SERPL-MCNC: 9 MG/DL (ref 8.3–10.1)
CHLORIDE SERPL-SCNC: 100 MMOL/L (ref 100–108)
CO2 SERPL-SCNC: 33 MMOL/L (ref 21–32)
CREAT SERPL-MCNC: 1.04 MG/DL (ref 0.6–1.3)
EOSINOPHIL # BLD AUTO: 0 THOUSAND/ΜL (ref 0–0.61)
EOSINOPHIL NFR BLD AUTO: 0 % (ref 0–6)
ERYTHROCYTE [DISTWIDTH] IN BLOOD BY AUTOMATED COUNT: 14.6 % (ref 11.6–15.1)
GFR SERPL CREATININE-BSD FRML MDRD: 56 ML/MIN/1.73SQ M
GLUCOSE SERPL-MCNC: 149 MG/DL (ref 65–140)
HCT VFR BLD AUTO: 42 % (ref 34.8–46.1)
HGB BLD-MCNC: 13.7 G/DL (ref 11.5–15.4)
IMM GRANULOCYTES # BLD AUTO: 0.07 THOUSAND/UL (ref 0–0.2)
IMM GRANULOCYTES NFR BLD AUTO: 1 % (ref 0–2)
LYMPHOCYTES # BLD AUTO: 0.52 THOUSANDS/ΜL (ref 0.6–4.47)
LYMPHOCYTES NFR BLD AUTO: 5 % (ref 14–44)
MAGNESIUM SERPL-MCNC: 1.5 MG/DL (ref 1.6–2.6)
MCH RBC QN AUTO: 32.2 PG (ref 26.8–34.3)
MCHC RBC AUTO-ENTMCNC: 32.6 G/DL (ref 31.4–37.4)
MCV RBC AUTO: 99 FL (ref 82–98)
MONOCYTES # BLD AUTO: 0.44 THOUSAND/ΜL (ref 0.17–1.22)
MONOCYTES NFR BLD AUTO: 4 % (ref 4–12)
NEUTROPHILS # BLD AUTO: 10.02 THOUSANDS/ΜL (ref 1.85–7.62)
NEUTS SEG NFR BLD AUTO: 90 % (ref 43–75)
NRBC BLD AUTO-RTO: 0 /100 WBCS
PHOSPHATE SERPL-MCNC: 3.4 MG/DL (ref 2.3–4.1)
PLATELET # BLD AUTO: 188 THOUSANDS/UL (ref 149–390)
PMV BLD AUTO: 11.1 FL (ref 8.9–12.7)
POTASSIUM SERPL-SCNC: 4.7 MMOL/L (ref 3.5–5.3)
RBC # BLD AUTO: 4.26 MILLION/UL (ref 3.81–5.12)
SODIUM SERPL-SCNC: 137 MMOL/L (ref 136–145)
WBC # BLD AUTO: 11.06 THOUSAND/UL (ref 4.31–10.16)

## 2022-01-25 PROCEDURE — 84100 ASSAY OF PHOSPHORUS: CPT

## 2022-01-25 PROCEDURE — 80048 BASIC METABOLIC PNL TOTAL CA: CPT | Performed by: INTERNAL MEDICINE

## 2022-01-25 PROCEDURE — 85730 THROMBOPLASTIN TIME PARTIAL: CPT | Performed by: INTERNAL MEDICINE

## 2022-01-25 PROCEDURE — 99233 SBSQ HOSP IP/OBS HIGH 50: CPT | Performed by: INTERNAL MEDICINE

## 2022-01-25 PROCEDURE — 85025 COMPLETE CBC W/AUTO DIFF WBC: CPT

## 2022-01-25 PROCEDURE — 94760 N-INVAS EAR/PLS OXIMETRY 1: CPT

## 2022-01-25 PROCEDURE — 94640 AIRWAY INHALATION TREATMENT: CPT

## 2022-01-25 PROCEDURE — 83735 ASSAY OF MAGNESIUM: CPT | Performed by: INTERNAL MEDICINE

## 2022-01-25 RX ADMIN — HEPARIN SODIUM 3600 UNITS: 1000 INJECTION INTRAVENOUS; SUBCUTANEOUS at 22:04

## 2022-01-25 RX ADMIN — GABAPENTIN 300 MG: 300 CAPSULE ORAL at 17:15

## 2022-01-25 RX ADMIN — ASPIRIN 81 MG: 81 TABLET, COATED ORAL at 08:34

## 2022-01-25 RX ADMIN — DORNASE ALFA 2.5 MG: 1 SOLUTION RESPIRATORY (INHALATION) at 19:32

## 2022-01-25 RX ADMIN — DEXAMETHASONE SODIUM PHOSPHATE 9.08 MG: 4 INJECTION INTRA-ARTICULAR; INTRALESIONAL; INTRAMUSCULAR; INTRAVENOUS; SOFT TISSUE at 08:34

## 2022-01-25 RX ADMIN — GABAPENTIN 300 MG: 300 CAPSULE ORAL at 08:34

## 2022-01-25 RX ADMIN — HEPARIN SODIUM 3600 UNITS: 1000 INJECTION INTRAVENOUS; SUBCUTANEOUS at 06:31

## 2022-01-25 RX ADMIN — DEXAMETHASONE SODIUM PHOSPHATE 9.08 MG: 4 INJECTION INTRA-ARTICULAR; INTRALESIONAL; INTRAMUSCULAR; INTRAVENOUS; SOFT TISSUE at 22:02

## 2022-01-25 RX ADMIN — FLUTICASONE FUROATE AND VILANTEROL TRIFENATATE 1 PUFF: 100; 25 POWDER RESPIRATORY (INHALATION) at 08:36

## 2022-01-25 RX ADMIN — Medication 6 MG: at 21:57

## 2022-01-25 RX ADMIN — DORNASE ALFA 2.5 MG: 1 SOLUTION RESPIRATORY (INHALATION) at 08:52

## 2022-01-25 RX ADMIN — TRAZODONE HYDROCHLORIDE 25 MG: 50 TABLET ORAL at 21:57

## 2022-01-25 RX ADMIN — GABAPENTIN 300 MG: 300 CAPSULE ORAL at 21:57

## 2022-01-25 RX ADMIN — FUROSEMIDE 20 MG: 10 INJECTION, SOLUTION INTRAMUSCULAR; INTRAVENOUS at 08:34

## 2022-01-25 RX ADMIN — ATORVASTATIN CALCIUM 40 MG: 40 TABLET, FILM COATED ORAL at 17:15

## 2022-01-25 RX ADMIN — BARICITINIB 2 MG: 2 TABLET, FILM COATED ORAL at 08:34

## 2022-01-25 RX ADMIN — PANTOPRAZOLE SODIUM 20 MG: 20 TABLET, DELAYED RELEASE ORAL at 08:34

## 2022-01-25 NOTE — PROGRESS NOTES
BP 86/61 after lasix given to patient  Atenolol was not given as it was not available  Scottie Lynn made aware  Patient oriented X 4  Just slight C/O feeling lightheadedness

## 2022-01-25 NOTE — CASE MANAGEMENT
Case Management Assessment & Discharge Planning Note    Patient name Jordan Edmonds  Location [facility] 925-50 MRN [id number]  : 1955 Date 2022       Current Admission Date: 2022  Current Admission Diagnosis:Community acquired pneumonia of left lower lobe of lung   Patient Active Problem List    Diagnosis Date Noted    [facility] 2022    Community acquired pneumonia of left lower lobe of lung 2022    Hypomagnesemia 2022    Hypokalemia 2022    Obesity, morbid ([facility] ) 2021    Hx of pulmonary embolus 10/21/2021    Stroke ([facility] ) 10/20/2021    Bronchitis 2019    Prothrombin mutation ([facility] ) 10/10/2018    Dyspepsia 2017    Irritable bowel syndrome 2017    Chronic insomnia 2017    Osteoarthritis of left hip 2016    Hypercoagulable state ([facility] ) 2015    Benign essential hypertension 2015    Dyslipidemia 2015    Gastroesophageal reflux disease without esophagitis 2015    Smoking 2015    Hip arthritis 2015    Spinal stenosis of lumbar region 2015    Vitamin D deficiency 2014    Depressive disorder 2012    Hyperlipidemia 2012      LOS (days): 4  Geometric Mean LOS (GMLOS) (days):   Days to GMLOS:     OBJECTIVE:    Risk of Unplanned Readmission Score: 20         Current admission status: Inpatient       Preferred Pharmacy:   [facility]/pharmacy [address] [place], [place] - [address]  Phone: [phone] Fax: [phone] for [facility]  [address] [place] [address]  Phone: [phone] Fax: [phone]    Primary Care Provider: Michael Councilman, MD    Primary Insurance: Marcos Smith [facility]  Secondary Insurance:     ASSESSMENT:  [address] , [address] [place] Representative - Daughter   Primary Phone: [phone] (Mobile)               Advance Directives  Does patient have a [address]?: Yes  Was patient offered paperwork?: No  Does patient currently have a Health Care decision maker?: Yes, please see Health Care Proxy section  Does patient have Advance Directives?: Yes  Advance Directives: Living will,Power of  for health care  Primary Contact: Rosangela Hamm (Daughter) [phone] (M)              Patient Information  Admitted from[de-identified] Home  Mental Status: Alert  During Assessment patient was accompanied by: Not accompanied during assessment  Assessment information provided by[de-identified] Patient  Primary Caregiver: Self  Support Systems: [facility] Residence: [address] [place] [address] do you live in?: [address] [place] [address] entry access options   Select all that apply : Stairs  Number of steps to enter home : 3  Do the steps have railings?: Yes  Type of Current Residence: [address] [place] [address] home  Upon entering residence, is there a bedroom on the main floor (no further steps)?: No  A bedroom is located on the following floor levels of residence (select all that apply):: 2nd Floor  Upon entering residence, is there a bathroom on the main floor (no further steps)?: No  Indicate which floors of current residence have a bathroom (select all the apply):: 2nd Floor  Number of steps to 2nd floor from main floor: One Flight  In the last 12 months, was there a time when you were not able to pay the mortgage or rent on time?: No  In the last 12 months, how many places have you lived?: 1  In the last 12 months, was there a time when you did not have a steady place to sleep or slept in a shelter (including now)?: No  Homeless/housing insecurity resource given?: No  Living Arrangements: Other (Comment),Lives w/ Son,Lives w/ Daughter  Is patient a ?: No    Activities of Daily Living Prior to Admission  Functional Status: Independent  Completes ADLs independently?: Yes  Ambulates independently?: Yes  Does patient use assisted devices?: No  Does patient currently own DME?: No  Does patient have a history of Outpatient Therapy (PT/OT)?: No  Does the patient have a history of Short-Term Rehab?: No  Does patient have a history of HHC?: Yes ([facility])  Does patient currently have Nayely Marsh?: No         Patient Information Continued  Income Source: Pension/jail  Does patient have prescription coverage?: Yes  Within the past 12 months, you worried that your food would run out before you got the money to buy more : Never true  Within the past 12 months, the food you bought just didnt last and you didnt have money to get more : Never true  Food insecurity resource given?: No  Does patient receive dialysis treatments?: No  Does patient have a history of substance abuse?: No  Does patient have a history of Mental Health Diagnosis?: No    PHQ 2/9 Screening   Reviewed PHQ 2/9 Depression Screening Score?: No    Means of Transportation  Means of Transport to Appts[de-identified] Drives Self  In the past 12 months, has lack of transportation kept you from medical appointments or from getting medications?: No  In the past 12 months, has lack of transportation kept you from meetings, work, or from getting things needed for daily living?: No  Was application for public transport provided?: No        DISCHARGE DETAILS:    Discharge planning discussed with[de-identified] Pt  Freedom of Choice: Yes  Comments - Freedom of Choice: FOC to be given for DC planning    CM contacted family/caregiver?: No- see comments  Were Treatment Team discharge recommendations reviewed with patient/caregiver?: No  Did patient/caregiver verbalize understanding of patient care needs?: No  Were patient/caregiver advised of the risks associated with not following Treatment Team discharge recommendations?: No- see comments    Contacts  Patient Contacts: Natalee  Relationship to Patient[de-identified] Family  Contact Method: Phone  Phone Number: [phone]  Reason/Outcome: Continuity of Care,Emergency [facility]         Is the patient interested in [facility] at discharge?: No    DME Referral Provided  Referral made for DME?: No    Other Referral/Resources/Interventions Provided:  Interventions: None Indicated    Would you like to participate in our [address] service program?  : No - Declined    Treatment Team Recommendation: Home  Discharge Destination Plan[de-identified] Home  Transport at Discharge : Auto with designated   Dispatcher Contacted: No              Transport Service Arrived: No  Transfer Mode: Other (Comment)  Accompanied by: Family member        CM reviewed d/c planning process including the following: identifying help at home, patient preference for d/c planning needs, Discharge Lounge, [facility] Meds to Bed program, availability of treatment team to discuss questions or concerns patient and/or family may have regarding understanding medications and recognizing signs and symptoms once discharged  CM also encouraged patient to follow up with all recommended appointments after discharge  Patient advised of importance for patient and family to participate in managing patients medical well being

## 2022-01-25 NOTE — UTILIZATION REVIEW
Continued Stay Review    Date:  1-                     Current Patient Class: inpatient  Current Level of Care: critical care     HPI:66 y o  female initially admitted on 1- 21-22 for community acquired pneumonia    Assessment/Plan:     Hematology consult for prothrombin gene mutation and history of DVT/PE  Now  with segmental pulmonary emboli in contest of possible covid 19 infection  Continue treatment of covid and hypercoagulable state, pulmonary emboli with heparin gtt, iv dexamethasone, baricitinib and iv lasix   Venous duplex completed without acute finding  Patient requires 50L O2 via high flow nasal cannula to maintain an O2 sat at least 91%  Consult hematology  323 [phone]  74-year-old  female with history of DVT in 2010 involving the right lower extremity while she was on OCP and active smoking, her mother had a history of DVT     Thrombophilia profile showed heterozygous prothrombin gene mutation she was treated with Lovenox and then Coumadin for 3 months and then discontinue with     In December 2015 she underwent right total hip replacement complicated with small pulmonary embolus in the right lower lobe treated with warfarin     In 02/2016 she underwent elective left hip replacement with IVC filter and keep on Lovenox and later on warfarin for 3 months, IVC filter was removed in 06/2016     She opted to stay on aspirin 81 mg p o   Daily indefinitely    She was admitted on 01/21/2022 with hypoxemia, dyspnea, COVID-19 test in the ER was negative chest x-ray showed left-sided pneumonia however CTA showed multiple bilateral pulmonary emboli with evidence of right heart strain, cardiomegaly, opacities in bilateral lungs could represent edema versus CHF versus COVID-19 pneumonitis  Currently on heparin drip     This is a hypercoagulable status from COVID-19 infection combined with history of prothrombin gene mutation and PFO     I suggest venous Doppler of the lower extremities     Future cardiology consult for consideration of PFO closure     After she stable hemodynamically and able to take oral medication I suggest Pradaxa 150 mg p o  B i d      She has high risk of bleeding as well because of history of ulcerative colitis        Vital Signs:     Date/  Time Temp Pulse Resp BP MAP SpO2 O2 Flow Rate (L/min) O2 Device O2 Interface Device   01/25/22 1300 -- 74 -- 86/56   Abnormal  75 -- -- -- --   01/25/22 1100 97 7 °F (36 5 °C) 74 -- 86/61   Abnormal  68 95 % -- -- --   01/25/22 1049 -- 74 -- 80/56   Abnormal  63 94 % -- -- --   01/25/22 0852 -- -- -- -- -- 93 % -- -- HFNC prongs   01/25/22 0400 97 9 °F (36 6 °C) 56 20 166/83 110 93 % -- High flow nasal cannula --   01/25/22 0306 -- -- -- -- -- 91 % -- -- HFNC prongs   01/25/22 0000 98 2 °F (36 8 °C) 64 12 132/73 92 92 % 50 L/min High flow nasal cannula --        01/24 0300 01/24 0400 01/24 0841 01/24 0903 01/24 1604 01/24 2032 01/25 0000 01/25 0306 01/25 0400 01/25 0852   Non-Invasive Ventilation Mode   HFNC (H  HFNC (H  HFNC (H  HFNC (H  HFNC (H  FiO2 (%)  100  85  75  70  70   Flow (lpm)  55  50  50  50  50   O2 Device High fl    High fl    High fl    High fl    High fl       O2 Interface Device  HFNC pr  HFNC pr  HFNC pr  HFNC pr  HFNC pr  HFNC pr  O2 Therapy    Oxygen     Oxygen     O2 Flow Rate (L/min) (L/min) 55   50 50  50                Pertinent Labs/Diagnostic Results:     VAS lower limb venous duplex study, complete bilateral   Final  (01/24 2051)   Impression:  RIGHT LOWER LIMB:  No evidence of acute or chronic deep vein thrombosis  No evidence of superficial thrombophlebitis noted  Doppler evaluation shows reflux in the posterior tibial arteries, this is  consistent with valvular reflux  Popliteal, posterior tibial and anterior tibial arterial Doppler waveforms are  triphasic  LEFT LOWER LIMB:  No evidence of acute or chronic deep vein thrombosis    No evidence of superficial thrombophlebitis noted  Doppler evaluation shows a normal response to augmentation maneuvers  Popliteal, posterior tibial and anterior tibial arterial Doppler waveforms are  triphasic  Results from last 7 days   Lab Units 01/21/22  1415   SARS-COV-2  Negative     Results from last 7 days   Lab Units 01/25/22  0518 01/23/22  0449 01/22/22  0524 01/21/22  1415 01/21/22  1415   WBC Thousand/uL 11 06* 13 96* 9 09   < > 10 89*   HEMOGLOBIN g/dL 13 7 12 3 13 5  --  14 4   HEMATOCRIT % 42 0 37 1 40 0  --  43 3   PLATELETS Thousands/uL 188 151 127*   < > 137*   NEUTROS ABS Thousands/µL 10 02* 12 16*  --   --  8 28*    < > = values in this interval not displayed           Results from last 7 days   Lab Units 01/25/22  0000 01/23/22  0449 01/22/22  0524 01/21/22  1415   SODIUM mmol/L 137 137 130* 131*   POTASSIUM mmol/L 4 7 4 5 4 6 3 3*   CHLORIDE mmol/L 100 101 94* 94*   CO2 mmol/L 33* 29 29 28   ANION GAP mmol/L 4 7 7 9   BUN mg/dL 30* 29* 29* 23   CREATININE mg/dL 1 04 1 18 1 38* 1 40*   EGFR ml/min/1 73sq m 56 48 39 39   CALCIUM mg/dL 9 0 8 6 9 3 9 0   CALCIUM, IONIZED mmol/L  --   --   --  1 00*   MAGNESIUM mg/dL 1 5*  --  1 7 0 9*   PHOSPHORUS mg/dL 3 4  --   --   --      Results from last 7 days   Lab Units 01/21/22  1415   AST U/L 34   ALT U/L 13   ALK PHOS U/L 71   TOTAL PROTEIN g/dL 7 6   ALBUMIN g/dL 2 6*   TOTAL BILIRUBIN mg/dL 0 76   BILIRUBIN DIRECT mg/dL 0 27*         Results from last 7 days   Lab Units 01/25/22  0000 01/23/22  0449 01/22/22  0524 01/21/22  1415   GLUCOSE RANDOM mg/dL 149* 151* 136 96       Results from last 7 days   Lab Units 01/22/22  0907   PH ART  7 408   PCO2 ART mm Hg 42 8   PO2 ART mm Hg 82 8   HCO3 ART mmol/L 26 4   BASE EXC ART mmol/L 1 5   O2 CONTENT ART mL/dL 18 8   O2 HGB, ARTERIAL % 94 6   ABG SOURCE  Radial, Right   NON VENT TYPE HFNC  HFNC Flow   HFNC FLOW LPM  55             Results from last 7 days   Lab Units 01/21/22  1415   CK TOTAL U/L 55 Results from last 7 days   Lab Units 01/21/22  1944 01/21/22  1552 01/21/22  1415   HS TNI 0HR ng/L  --   --  80   HS TNI 2HR ng/L  --  99  --    HSTNI D2 ng/L  --  19  --    HS TNI 4HR ng/L 81  --   --    HSTNI D4 ng/L 1  --   --      Results from last 7 days   Lab Units 01/21/22  1552   D-DIMER QUANTITATIVE ug/ml FEU 3 45*     Results from last 7 days   Lab Units 01/25/22  0549 01/24/22  0459 01/23/22  0444 01/21/22  2345 01/21/22  1552   PROTIME seconds  --   --   --   --  28 8*   INR   --   --   --   --  2 89*   PTT seconds 52* 61* 64*   < >  --     < > = values in this interval not displayed  Results from last 7 days   Lab Units 01/22/22  0524 01/21/22  1415   PROCALCITONIN ng/ml 0 21 0 19     Results from last 7 days   Lab Units 01/21/22  1415   LACTIC ACID mmol/L 2 0             Results from last 7 days   Lab Units 01/21/22  1415   NT-PRO BNP pg/mL 9,960*     Results from last 7 days   Lab Units 01/21/22  1415   FERRITIN ng/mL 121             Results from last 7 days   Lab Units 01/21/22  1415   CRP mg/L 110 0*       Results from last 7 days   Lab Units 01/21/22  2209 01/21/22  1415   STREP PNEUMONIAE ANTIGEN, URINE  Negative  --    LEGIONELLA URINARY ANTIGEN  Negative  --    INFLUENZA A PCR   --  Negative   INFLUENZA B PCR   --  Negative   RSV PCR   --  Negative         Results from last 7 days   Lab Units 01/21/22  2342 01/21/22  1415   BLOOD CULTURE   --  No Growth at 72 hrs  No Growth at 72 hrs     SPUTUM CULTURE  2+ Growth of   --    GRAM STAIN RESULT  No Epithelial cells seen*  2+ Gram positive cocci in clusters*  1+ Gram negative rods*  No polys seen*  --            Scheduled Medications:    aspirin, 81 mg, Oral, Daily  atenolol, 25 mg, Oral, Daily  atorvastatin, 40 mg, Oral, QPM  Baricitinib, 2 mg, Oral, Q24H  dexamethasone, 0 1 mg/kg, Intravenous, Q12H  dornase madiha, 2 5 mg, Inhalation, BID  FLUoxetine, 30 mg, Oral, Daily  fluticasone-vilanterol, 1 puff, Inhalation, Daily  furosemide, 20 mg, Intravenous, BID (diuretic)  gabapentin, 300 mg, Oral, TID  melatonin, 6 mg, Oral, HS  pantoprazole, 20 mg, Oral, Daily  traZODone, 25 mg, Oral, HS      Continuous IV Infusions:  heparin (porcine), 3-30 Units/kg/hr (Order-Specific), Intravenous, Titrated      PRN Meds:  acetaminophen, 650 mg, Oral, Q6H PRN  albuterol, 2 5 mg, Nebulization, Q4H PRN  benzonatate, 100 mg, Oral, TID PRN  heparin (porcine), 3,600 Units, Intravenous, Q1H PRN  heparin (porcine), 7,200 Units, Intravenous, Q1H PRN  ondansetron, 4 mg, Intravenous, Q6H PRN        Discharge Plan: to be determined     Network Utilization Review Department  ATTENTION: Please call with any questions or concerns to [phone] and carefully listen to the prompts so that you are directed to the right person  All voicemails are confidential   [facility] all requests for admission clinical reviews, approved or denied determinations and any other requests to dedicated fax number below belonging to the campus where the patient is receiving treatment   List of dedicated fax numbers for the Facilities:  [address] DENIALS (Administrative/Medical Necessity) [phone]   [address] (Maternity/NICU/Pediatrics) [phone]   [address]  [address] [facility] [place] [address] [id number] [place] [address] [address] [facility] [facility] [phone]

## 2022-01-25 NOTE — PLAN OF CARE
Problem: Potential for Falls  Goal: Patient will remain free of falls  Description: INTERVENTIONS:  - Educate patient/family on patient safety including physical limitations  - Instruct patient to call for assistance with activity   - Consult OT/PT to assist with strengthening/mobility   - Keep Call bell within reach  - Keep bed low and locked with side rails adjusted as appropriate  - Keep care items and personal belongings within reach  - Initiate and maintain comfort rounds  - Make Fall Risk Sign visible to staff  - Offer Toileting every 2 Hours, in advance of need  - Apply yellow socks and bracelet for high fall risk patients  - Consider moving patient to room near nurses station  Outcome: Progressing     Problem: PAIN - ADULT  Goal: Verbalizes/displays adequate comfort level or baseline comfort level  Description: Interventions:  - Encourage patient to monitor pain and request assistance  - Assess pain using appropriate pain scale  - Administer analgesics based on type and severity of pain and evaluate response  - Implement non-pharmacological measures as appropriate and evaluate response  - Consider cultural and social influences on pain and pain management  - Notify physician/advanced practitioner if interventions unsuccessful or patient reports new pain  Outcome: Progressing     Problem: RESPIRATORY - ADULT  Goal: Achieves optimal ventilation and oxygenation  Description: INTERVENTIONS:  - Assess for changes in respiratory status  - Assess for changes in mentation and behavior  - Position to facilitate oxygenation and minimize respiratory effort  - Oxygen administered by appropriate delivery if ordered  - Initiate smoking cessation education as indicated  - Encourage broncho-pulmonary hygiene including cough, deep breathe, Incentive Spirometry  - Assess the need for suctioning and aspirate as needed  - Assess and instruct to report SOB or any respiratory difficulty  - Respiratory Therapy support as indicated  Outcome: Progressing

## 2022-01-26 LAB
ANION GAP SERPL CALCULATED.3IONS-SCNC: 4 MMOL/L (ref 4–13)
APTT PPP: 135 SECONDS (ref 23–37)
BACTERIA BLD CULT: NORMAL
BACTERIA BLD CULT: NORMAL
BASOPHILS # BLD AUTO: 0.01 THOUSANDS/ΜL (ref 0–0.1)
BASOPHILS NFR BLD AUTO: 0 % (ref 0–1)
BUN SERPL-MCNC: 33 MG/DL (ref 5–25)
CALCIUM SERPL-MCNC: 9 MG/DL (ref 8.3–10.1)
CHLORIDE SERPL-SCNC: 100 MMOL/L (ref 100–108)
CO2 SERPL-SCNC: 32 MMOL/L (ref 21–32)
CREAT SERPL-MCNC: 1 MG/DL (ref 0.6–1.3)
EOSINOPHIL # BLD AUTO: 0 THOUSAND/ΜL (ref 0–0.61)
EOSINOPHIL NFR BLD AUTO: 0 % (ref 0–6)
ERYTHROCYTE [DISTWIDTH] IN BLOOD BY AUTOMATED COUNT: 14.7 % (ref 11.6–15.1)
GFR SERPL CREATININE-BSD FRML MDRD: 58 ML/MIN/1.73SQ M
GLUCOSE SERPL-MCNC: 169 MG/DL (ref 65–140)
HCT VFR BLD AUTO: 39.3 % (ref 34.8–46.1)
HGB BLD-MCNC: 13 G/DL (ref 11.5–15.4)
IMM GRANULOCYTES # BLD AUTO: 0.12 THOUSAND/UL (ref 0–0.2)
IMM GRANULOCYTES NFR BLD AUTO: 1 % (ref 0–2)
LYMPHOCYTES # BLD AUTO: 0.57 THOUSANDS/ΜL (ref 0.6–4.47)
LYMPHOCYTES NFR BLD AUTO: 5 % (ref 14–44)
MAGNESIUM SERPL-MCNC: 1.3 MG/DL (ref 1.6–2.6)
MCH RBC QN AUTO: 32 PG (ref 26.8–34.3)
MCHC RBC AUTO-ENTMCNC: 33.1 G/DL (ref 31.4–37.4)
MCV RBC AUTO: 97 FL (ref 82–98)
MONOCYTES # BLD AUTO: 0.6 THOUSAND/ΜL (ref 0.17–1.22)
MONOCYTES NFR BLD AUTO: 5 % (ref 4–12)
NEUTROPHILS # BLD AUTO: 11.1 THOUSANDS/ΜL (ref 1.85–7.62)
NEUTS SEG NFR BLD AUTO: 89 % (ref 43–75)
NRBC BLD AUTO-RTO: 0 /100 WBCS
PHOSPHATE SERPL-MCNC: 3.4 MG/DL (ref 2.3–4.1)
PLATELET # BLD AUTO: 195 THOUSANDS/UL (ref 149–390)
PMV BLD AUTO: 11.6 FL (ref 8.9–12.7)
POTASSIUM SERPL-SCNC: 4.8 MMOL/L (ref 3.5–5.3)
RBC # BLD AUTO: 4.06 MILLION/UL (ref 3.81–5.12)
SODIUM SERPL-SCNC: 136 MMOL/L (ref 136–145)
WBC # BLD AUTO: 12.4 THOUSAND/UL (ref 4.31–10.16)

## 2022-01-26 PROCEDURE — 85730 THROMBOPLASTIN TIME PARTIAL: CPT | Performed by: INTERNAL MEDICINE

## 2022-01-26 PROCEDURE — 94640 AIRWAY INHALATION TREATMENT: CPT

## 2022-01-26 PROCEDURE — 94760 N-INVAS EAR/PLS OXIMETRY 1: CPT

## 2022-01-26 PROCEDURE — 80048 BASIC METABOLIC PNL TOTAL CA: CPT

## 2022-01-26 PROCEDURE — 99233 SBSQ HOSP IP/OBS HIGH 50: CPT | Performed by: INTERNAL MEDICINE

## 2022-01-26 PROCEDURE — 84100 ASSAY OF PHOSPHORUS: CPT

## 2022-01-26 PROCEDURE — 85025 COMPLETE CBC W/AUTO DIFF WBC: CPT

## 2022-01-26 PROCEDURE — 83735 ASSAY OF MAGNESIUM: CPT

## 2022-01-26 RX ORDER — ALBUMIN, HUMAN INJ 5% 5 %
12.5 SOLUTION INTRAVENOUS ONCE
Status: COMPLETED | OUTPATIENT
Start: 2022-01-26 | End: 2022-01-26

## 2022-01-26 RX ORDER — DABIGATRAN ETEXILATE 150 MG/1
150 CAPSULE, COATED PELLETS ORAL EVERY 12 HOURS SCHEDULED
Status: DISCONTINUED | OUTPATIENT
Start: 2022-01-26 | End: 2022-01-26

## 2022-01-26 RX ORDER — DABIGATRAN ETEXILATE 150 MG/1
150 CAPSULE, COATED PELLETS ORAL EVERY 12 HOURS SCHEDULED
Status: DISCONTINUED | OUTPATIENT
Start: 2022-01-26 | End: 2022-01-31 | Stop reason: HOSPADM

## 2022-01-26 RX ORDER — MAGNESIUM SULFATE HEPTAHYDRATE 40 MG/ML
4 INJECTION, SOLUTION INTRAVENOUS ONCE
Status: COMPLETED | OUTPATIENT
Start: 2022-01-26 | End: 2022-01-26

## 2022-01-26 RX ORDER — ATENOLOL 25 MG/1
25 TABLET ORAL DAILY
Status: DISCONTINUED | OUTPATIENT
Start: 2022-01-27 | End: 2022-01-26

## 2022-01-26 RX ORDER — ATENOLOL 25 MG/1
25 TABLET ORAL DAILY
Status: DISCONTINUED | OUTPATIENT
Start: 2022-01-27 | End: 2022-01-27

## 2022-01-26 RX ADMIN — DABIGATRAN ETEXILATE MESYLATE 150 MG: 150 CAPSULE ORAL at 13:23

## 2022-01-26 RX ADMIN — GABAPENTIN 300 MG: 300 CAPSULE ORAL at 21:24

## 2022-01-26 RX ADMIN — FLUTICASONE FUROATE AND VILANTEROL TRIFENATATE 1 PUFF: 100; 25 POWDER RESPIRATORY (INHALATION) at 08:10

## 2022-01-26 RX ADMIN — BARICITINIB 2 MG: 2 TABLET, FILM COATED ORAL at 08:09

## 2022-01-26 RX ADMIN — DEXAMETHASONE SODIUM PHOSPHATE 9.08 MG: 4 INJECTION INTRA-ARTICULAR; INTRALESIONAL; INTRAMUSCULAR; INTRAVENOUS; SOFT TISSUE at 21:24

## 2022-01-26 RX ADMIN — DORNASE ALFA 2.5 MG: 1 SOLUTION RESPIRATORY (INHALATION) at 08:39

## 2022-01-26 RX ADMIN — PANTOPRAZOLE SODIUM 20 MG: 20 TABLET, DELAYED RELEASE ORAL at 08:09

## 2022-01-26 RX ADMIN — ALBUMIN (HUMAN) 12.5 G: 12.5 INJECTION, SOLUTION INTRAVENOUS at 15:09

## 2022-01-26 RX ADMIN — MAGNESIUM SULFATE HEPTAHYDRATE 4 G: 40 INJECTION, SOLUTION INTRAVENOUS at 10:07

## 2022-01-26 RX ADMIN — DEXAMETHASONE SODIUM PHOSPHATE 9.08 MG: 4 INJECTION INTRA-ARTICULAR; INTRALESIONAL; INTRAMUSCULAR; INTRAVENOUS; SOFT TISSUE at 08:09

## 2022-01-26 RX ADMIN — DORNASE ALFA 2.5 MG: 1 SOLUTION RESPIRATORY (INHALATION) at 20:55

## 2022-01-26 RX ADMIN — ASPIRIN 81 MG: 81 TABLET, COATED ORAL at 08:09

## 2022-01-26 RX ADMIN — FUROSEMIDE 20 MG: 10 INJECTION, SOLUTION INTRAMUSCULAR; INTRAVENOUS at 08:09

## 2022-01-26 RX ADMIN — HEPARIN SODIUM 16 UNITS/KG/HR: 10000 INJECTION, SOLUTION INTRAVENOUS at 00:00

## 2022-01-26 RX ADMIN — TRAZODONE HYDROCHLORIDE 25 MG: 50 TABLET ORAL at 21:25

## 2022-01-26 RX ADMIN — DABIGATRAN ETEXILATE MESYLATE 150 MG: 150 CAPSULE ORAL at 21:23

## 2022-01-26 RX ADMIN — GABAPENTIN 300 MG: 300 CAPSULE ORAL at 15:41

## 2022-01-26 RX ADMIN — ATORVASTATIN CALCIUM 40 MG: 40 TABLET, FILM COATED ORAL at 17:42

## 2022-01-26 RX ADMIN — GABAPENTIN 300 MG: 300 CAPSULE ORAL at 08:09

## 2022-01-26 RX ADMIN — ATENOLOL 25 MG: 25 TABLET ORAL at 08:13

## 2022-01-26 RX ADMIN — Medication 6 MG: at 21:25

## 2022-01-26 RX ADMIN — ALBUMIN (HUMAN) 12.5 G: 12.5 INJECTION, SOLUTION INTRAVENOUS at 21:27

## 2022-01-26 NOTE — PLAN OF CARE
Problem: Potential for Falls  Goal: Patient will remain free of falls  Description: INTERVENTIONS:  - Educate patient/family on patient safety including physical limitations  - Instruct patient to call for assistance with activity   - Consult OT/PT to assist with strengthening/mobility   - Keep Call bell within reach  - Keep bed low and locked with side rails adjusted as appropriate  - Keep care items and personal belongings within reach  - Initiate and maintain comfort rounds  - Make Fall Risk Sign visible to staff  - Consider moving patient to room near nurses station  Outcome: Progressing     Problem: PAIN - ADULT  Goal: Verbalizes/displays adequate comfort level or baseline comfort level  Description: Interventions:  - Encourage patient to monitor pain and request assistance  - Assess pain using appropriate pain scale  - Administer analgesics based on type and severity of pain and evaluate response  - Implement non-pharmacological measures as appropriate and evaluate response  - Consider cultural and social influences on pain and pain management  - Notify physician/advanced practitioner if interventions unsuccessful or patient reports new pain  Outcome: Progressing     Problem: SAFETY ADULT  Goal: Patient will remain free of falls  Description: INTERVENTIONS:  - Educate patient/family on patient safety including physical limitations  - Instruct patient to call for assistance with activity   - Consult OT/PT to assist with strengthening/mobility   - Keep Call bell within reach  - Keep bed low and locked with side rails adjusted as appropriate  - Keep care items and personal belongings within reach  - Initiate and maintain comfort rounds  - Apply yellow socks and bracelet for high fall risk patients  - Consider moving patient to room near nurses station  Outcome: Progressing     Problem: RESPIRATORY - ADULT  Goal: Achieves optimal ventilation and oxygenation  Description: INTERVENTIONS:  - Assess for changes in respiratory status  - Assess for changes in mentation and behavior  - Position to facilitate oxygenation and minimize respiratory effort  - Oxygen administered by appropriate delivery if ordered  - Initiate smoking cessation education as indicated  - Encourage broncho-pulmonary hygiene including cough, deep breathe, Incentive Spirometry  - Assess the need for suctioning and aspirate as needed  - Assess and instruct to report SOB or any respiratory difficulty  - Respiratory Therapy support as indicated  Outcome: Progressing

## 2022-01-26 NOTE — PROGRESS NOTES
Progress Note - Critical Care   Ion Venegas 77 y o  female MRN: [id number]  [facility]: [id number]    SUBJECTIVE :  Patient complains of discomfort where bruised on R forearm near peripheral IV site  No complaints otherwise  HPI/24HR Event:  No acute overnight events  ROS  Denies chest pain, abdominal pain, SOB, palpitations, dizziness, lightheadedness, fevers, chills  Invasive lines and devices: Invasive Devices  Report    Peripheral Intravenous Line            Peripheral IV 22 Right; Lower Arm 1 day    Peripheral IV 22 Left Antecubital <1 day                   Physical exam  General: Alert and oriented x3  Resting comfortably in bed in no acute distress  Neuro: Alert and oriented x3  HENT: Normocephalic and atraumatic, PERRL  Heart: S1,S2, RRR, pulses intact  Lungs: Bilateral breath sounds present, crackles at bases b/l  Mild wheezes  Abdomen: NT/ND, Bowel sounds present, soft  Skin:  R forearm with bruise, soft with some infiltration from peripheral IV   Warm, dry skin/Incision site clean dry and intact    Vitals  Temperature:   Temp (24hrs), Av °F (36 7 °C), Min:97 7 °F (36 5 °C), Max:98 1 °F (36 7 °C)    Current: Temperature: 98 1 °F (36 7 °C)    Vitals:    22 0325 22 0440 22 0441 22 0535   BP:  124/78     BP Location:  Left arm     Pulse:  84     Resp: 16 18     Temp:  98 1 °F (36 7 °C)     TempSrc:  Oral     SpO2: 95% (!) 86% 92%    Weight:    91 kg (200 lb 9 9 oz)   Height:                 Labs:   Results from last 7 days   Lab Units 22  0433 22  0518 22  0449   WBC Thousand/uL 12 40* 11 06* 13 96*   HEMOGLOBIN g/dL 13 0 13 7 12 3   HEMATOCRIT % 39 3 42 0 37 1   PLATELETS Thousands/uL 195 188 151   NEUTROS PCT % 89* 90* 88*   MONOS PCT % 5 4 5      Results from last 7 days   Lab Units 22  0433 22  0000 22  0449 22  0524 22  1415   SODIUM mmol/L 136 137 137   < > 131*   POTASSIUM mmol/L 4 8 4 7 4 5   < > 3 3*   CHLORIDE mmol/L 100 100 101   < > 94*   CO2 mmol/L 32 33* 29   < > 28   BUN mg/dL 33* 30* 29*   < > 23   CREATININE mg/dL 1 00 1 04 1 18   < > 1 40*   CALCIUM mg/dL 9 0 9 0 8 6   < > 9 0   ALK PHOS U/L  --   --   --   --  71   ALT U/L  --   --   --   --  13   AST U/L  --   --   --   --  34    < > = values in this interval not displayed  Results from last 7 days   Lab Units 01/26/22  0433 01/25/22  0000 01/22/22  0524   MAGNESIUM mg/dL 1 3* 1 5* 1 7     Results from last 7 days   Lab Units 01/26/22 0433 01/25/22  0000   PHOSPHORUS mg/dL 3 4 3 4      Results from last 7 days   Lab Units 01/26/22 0433 01/25/22 2005 01/25/22  1347 01/21/22  2345 01/21/22  1552   INR   --   --   --   --  2 89*   PTT seconds 135* 58* 89*   < >  --     < > = values in this interval not displayed  Results from last 7 days   Lab Units 01/21/22  1415   LACTIC ACID mmol/L 2 0       Other Labs    Intake and Outputs:  I/O       01/22 0701  01/23 0700 01/23 0701  01/24 0700 01/24 0701  01/25 0700    P  O   420     I V  (mL/kg) 125 1 (1 4)  274 1 (3)    IV Piggyback       Total Intake(mL/kg) 125 1 (1 4) 420 (4 6) 274 1 (3)    Urine (mL/kg/hr) 500 (0 2) 1900 (0 9) 150 (0 3)    Total Output 500 1900 150    Net -374 9 -1480 +124 1                 Imaging Studies      Assessment & Plan:   Patient is a 69F with hx of prothrombin gene mutation on Eliquis, hx of PE, hx of CVA, GERD, HTN, depressive disorder, dyslipidemia who is under critical care service for acute hypoxic respiratory failure, COVID pneumonia, bilateral pulmonary embolism with right ventricular strain (likely 2/2 COVID PNA), volume overload, CHAKA  -Neuro:   Dx:  Hx stroke (on eliquis), depression  - continue heparin drip   - on home fluoxetine and trazodone  - hold eliquis  - No sedation  - Delirium ppx: CAM-ICU, sleep hygiene      CV:   - Dx: Volume overload, elevated BNP, possibly acute diastolic CHF EF 40%  - RV strain seen on CT but not on echocardiogram  - Hemodynamic support: none  - echo in 3-6 months  - MAP goal > 65   - Echo today pending  - will continue diuresis lasix 20 mg IV BID   - monitor I/O       Lung:   - Dx: Acute hypoxic respiratory failure, multifactorial 2/2 COVID PNA, bilateral PE with RV strain, possible diastolic CHF; suspected COPD   - on HFNC 70%/50L, Keep O2 sat >88%  - continue COVID severe pathway, day 5 of baricitinib and decadron  - CT with R heart strain, not seen on echo  - Lasix held one dose yesterday 2/2 hypotension  - Continue diuresis IV lasix   - Heparin drip for PE -> transition to pradaxa later for anticoagulation, appreciate hematology recommendations  - Breo and PRN albuterol inhalers   - plan to obtain outpatient PFT   - Continue Respiratory Protocol       GI:   - Dx: none  - on protonix      FEN:   - Fluids: no maintenance fluids  - Electrolytes: trend and replete as needed Mg>2, phos>3, K>4  - Nutrition: Continue cardiovascular diet      :   - Dx: CHAKA - resolved   - Cr 1 00 this AM  - continue diuresis lasix 20 mg IV BID  - I/O last 24 hours: In: 528 6 [P O :220; I V :308 6]  Out: 1350 [Urine:1350]  - no white    ID:   - Dx: COVID PNA  - WBC: 12 4 increased from yesterday  - Temp: afebrile  - Abx: none  - Cultures:  Sputum culture: with 2+ mixed respiratory cecilia, gm stain sputum culture: with 2+ g positive cocci in clusters and 1+ Gram-negative rods, Covid/flu/RSV negative; blood cx: neg x2 72h  - Monitor WBC/temp curve    Heme:   - Dx: PE with Right heart strain afte COVID infection,  Prothrombin Gene mutation, history of stroke   - cn't heparin drip  - DVT ppx: heparin drip, SCDs   - later on consider apixaban 5 mg PO BID for stroke with BRAYDEN showing small PFO with R->L shunt, appreciate Heme/Onc recommendations      Endo:   - Dx: none  - at goal  - Goal -180    Msk/Skin:   - PT/OT  - Turning/repositioning  - home gabapentin     Disposition: SD1           Non-Invasive/Invasive Ventilation Settings:  Respiratory  Report   Lab Data (Last 4 hours)    None         O2/Vent Data (Last 4 hours)    None              No results found for: PHART, IGV8BMK, PO2ART, GPN2VWV, C6DBANRE, BEART, SOURCE       Imaging:   VAS lower limb venous duplex study, complete bilateral   Final Result by DO Britt (01/24 2051)      CTA ED chest PE study   Final Result by Claire Mathias DO (01/21 2337)      Multiple bilateral pulmonary emboli, as described with evidence of right heart strain  Cardiomegaly, moderate to severe multiple focal and confluent areas of groundglass and alveolar opacities seen in the bilateral lungs which could represent edema such as in the setting of fluid overload/CHF and/or infectious or inflammatory pneumonitis  Depending on the clinical setting  Correlation with the patient's symptoms, and laboratory values recommended  Mild scattered pulmonary emphysematous changes  Coronary atherosclerosis, shotty mediastinal and hilar lymph nodes, and other findings as above  I personally discussed this study with Dr Benna Lefort on 1/21/2022 at 10:43 PM                Workstation performed: [id number]         XR chest 1 view portable   ED Interpretation by Kyra Riojas MD (01/21 0256)   LEFT lower lobe PNA  Final Result by Jen Mir MD (01/21 1440)      Left-sided pneumonia  Recommend follow-up to ensure complete clearing after treatment  Workstation performed: [id number]           I have personally reviewed pertinent reports  Micro:  Lab Results   Component Value Date    BLOODCX No Growth After 4 Days  01/21/2022    BLOODCX No Growth After 4 Days  01/21/2022    WOUNDCULT No growth 04/11/2017    SPUTUMCULTUR 2+ Growth of  01/21/2022         Allergies:    Allergies   Allergen Reactions    Percocet [Oxycodone-Acetaminophen] Hives    Amlodipine     Percocet  [Oxycodone-Acetaminophen]          Medications:   Scheduled Meds:  Current Facility-Administered Medications   Medication Dose Route Frequency Provider Last Rate    acetaminophen  650 mg Oral Q6H PRN Layo Lin MD      albuterol  2 5 mg Nebulization Q4H PRN Layo Lin MD      aspirin  81 mg Oral Daily Layo Lin MD      atenolol  25 mg Oral Daily Layo Lin MD      atorvastatin  40 mg Oral QPM Layo Lin MD      Baricitinib  2 mg Oral Q24H Gerandrea Martinez, DO      benzonatate  100 mg Oral TID PRN Layo Lin MD      dexamethasone  0 1 mg/kg Intravenous Q12H Gerandrea Martinez, DO      dornase madiha  2 5 mg Inhalation BID Gerline Juan, DO      FLUoxetine  30 mg Oral Daily Layo Lin MD      fluticasone-vilanterol  1 puff Inhalation Daily Tala Summers MD      furosemide  20 mg Intravenous BID (diuretic) Tala Summers MD      gabapentin  300 mg Oral TID Layo Lin MD      heparin (porcine)  3-30 Units/kg/hr (Order-Specific) Intravenous Titrated Daylene Falling, DO Stopped (01/26/22 8667)    heparin (porcine)  3,600 Units Intravenous Q1H PRN Daylene Falling, DO      heparin (porcine)  7,200 Units Intravenous Q1H PRN Daylene Falling, DO      magnesium sulfate  4 g Intravenous Once Bertin Dirico, DO      melatonin  6 mg Oral HS Deepa Hatfield MD      ondansetron  4 mg Intravenous Q6H PRN Layo Lin MD      pantoprazole  20 mg Oral Daily Layo Lin MD      traZODone  25 mg Oral HS Layo Lin MD       Continuous Infusions:heparin (porcine), 3-30 Units/kg/hr (Order-Specific), Last Rate: Stopped (01/26/22 5877)      PRN Meds:  acetaminophen, 650 mg, Q6H PRN  albuterol, 2 5 mg, Q4H PRN  benzonatate, 100 mg, TID PRN  heparin (porcine), 3,600 Units, Q1H PRN  heparin (porcine), 7,200 Units, Q1H PRN  ondansetron, 4 mg, Q6H PRN        Counseling / Coordination of Care  Total Critical Care time spent 25 minutes excluding procedures, teaching and family updates         Code Status: Level 1 - Full Code     Portions of the record may have been created with voice recognition software  Occasional wrong word or "sound a like" substitutions may have occurred due to the inherent limitations of voice recognition software  Read the chart carefully and recognize, using context, where substitutions have occurred       Cassie Valentino MD  Internal Medicine Resident, PGY1  7:49 AM

## 2022-01-27 LAB
ANION GAP SERPL CALCULATED.3IONS-SCNC: 4 MMOL/L (ref 4–13)
BASE EXCESS BLDA CALC-SCNC: 4.1 MMOL/L
BASOPHILS # BLD AUTO: 0.01 THOUSANDS/ΜL (ref 0–0.1)
BASOPHILS NFR BLD AUTO: 0 % (ref 0–1)
BUN SERPL-MCNC: 38 MG/DL (ref 5–25)
CALCIUM SERPL-MCNC: 9.7 MG/DL (ref 8.3–10.1)
CHLORIDE SERPL-SCNC: 99 MMOL/L (ref 100–108)
CO2 SERPL-SCNC: 32 MMOL/L (ref 21–32)
CREAT SERPL-MCNC: 0.94 MG/DL (ref 0.6–1.3)
EOSINOPHIL # BLD AUTO: 0.01 THOUSAND/ΜL (ref 0–0.61)
EOSINOPHIL NFR BLD AUTO: 0 % (ref 0–6)
ERYTHROCYTE [DISTWIDTH] IN BLOOD BY AUTOMATED COUNT: 14.5 % (ref 11.6–15.1)
GFR SERPL CREATININE-BSD FRML MDRD: 63 ML/MIN/1.73SQ M
GLUCOSE SERPL-MCNC: 157 MG/DL (ref 65–140)
HCO3 BLDA-SCNC: 28.8 MMOL/L (ref 22–28)
HCT VFR BLD AUTO: 39.1 % (ref 34.8–46.1)
HFNC FLOW LPM: 50
HGB BLD-MCNC: 12.8 G/DL (ref 11.5–15.4)
IMM GRANULOCYTES # BLD AUTO: 0.12 THOUSAND/UL (ref 0–0.2)
IMM GRANULOCYTES NFR BLD AUTO: 1 % (ref 0–2)
LYMPHOCYTES # BLD AUTO: 0.46 THOUSANDS/ΜL (ref 0.6–4.47)
LYMPHOCYTES NFR BLD AUTO: 4 % (ref 14–44)
MAGNESIUM SERPL-MCNC: 2.2 MG/DL (ref 1.6–2.6)
MCH RBC QN AUTO: 32.6 PG (ref 26.8–34.3)
MCHC RBC AUTO-ENTMCNC: 32.7 G/DL (ref 31.4–37.4)
MCV RBC AUTO: 100 FL (ref 82–98)
MONOCYTES # BLD AUTO: 0.47 THOUSAND/ΜL (ref 0.17–1.22)
MONOCYTES NFR BLD AUTO: 4 % (ref 4–12)
NEUTROPHILS # BLD AUTO: 10.67 THOUSANDS/ΜL (ref 1.85–7.62)
NEUTS SEG NFR BLD AUTO: 91 % (ref 43–75)
NON VENT HFNC FIO2: 70
NON VENT TYPE HFNC: ABNORMAL
NRBC BLD AUTO-RTO: 0 /100 WBCS
O2 CT BLDA-SCNC: 17.8 ML/DL (ref 16–23)
OXYHGB MFR BLDA: 91.5 % (ref 94–97)
PCO2 BLDA: 43.7 MM HG (ref 36–44)
PH BLDA: 7.44 [PH] (ref 7.35–7.45)
PHOSPHATE SERPL-MCNC: 4.1 MG/DL (ref 2.3–4.1)
PLATELET # BLD AUTO: 221 THOUSANDS/UL (ref 149–390)
PMV BLD AUTO: 11.7 FL (ref 8.9–12.7)
PO2 BLDA: 67 MM HG (ref 75–129)
POTASSIUM SERPL-SCNC: 4.7 MMOL/L (ref 3.5–5.3)
RBC # BLD AUTO: 3.93 MILLION/UL (ref 3.81–5.12)
SODIUM SERPL-SCNC: 135 MMOL/L (ref 136–145)
SPECIMEN SOURCE: ABNORMAL
WBC # BLD AUTO: 11.74 THOUSAND/UL (ref 4.31–10.16)

## 2022-01-27 PROCEDURE — 94760 N-INVAS EAR/PLS OXIMETRY 1: CPT

## 2022-01-27 PROCEDURE — 85025 COMPLETE CBC W/AUTO DIFF WBC: CPT

## 2022-01-27 PROCEDURE — 84100 ASSAY OF PHOSPHORUS: CPT

## 2022-01-27 PROCEDURE — 94640 AIRWAY INHALATION TREATMENT: CPT

## 2022-01-27 PROCEDURE — 82805 BLOOD GASES W/O2 SATURATION: CPT | Performed by: STUDENT IN AN ORGANIZED HEALTH CARE EDUCATION/TRAINING PROGRAM

## 2022-01-27 PROCEDURE — 94760 N-INVAS EAR/PLS OXIMETRY 1: CPT | Performed by: SOCIAL WORKER

## 2022-01-27 PROCEDURE — 99232 SBSQ HOSP IP/OBS MODERATE 35: CPT | Performed by: INTERNAL MEDICINE

## 2022-01-27 PROCEDURE — 80048 BASIC METABOLIC PNL TOTAL CA: CPT

## 2022-01-27 PROCEDURE — 83735 ASSAY OF MAGNESIUM: CPT

## 2022-01-27 RX ORDER — MAGNESIUM SULFATE HEPTAHYDRATE 40 MG/ML
2 INJECTION, SOLUTION INTRAVENOUS ONCE
Status: DISCONTINUED | OUTPATIENT
Start: 2022-01-27 | End: 2022-01-27

## 2022-01-27 RX ORDER — MAGNESIUM SULFATE HEPTAHYDRATE 40 MG/ML
4 INJECTION, SOLUTION INTRAVENOUS ONCE
Status: DISCONTINUED | OUTPATIENT
Start: 2022-01-27 | End: 2022-01-27

## 2022-01-27 RX ADMIN — DORNASE ALFA 2.5 MG: 1 SOLUTION RESPIRATORY (INHALATION) at 20:10

## 2022-01-27 RX ADMIN — ATORVASTATIN CALCIUM 40 MG: 40 TABLET, FILM COATED ORAL at 17:43

## 2022-01-27 RX ADMIN — BARICITINIB 2 MG: 2 TABLET, FILM COATED ORAL at 08:54

## 2022-01-27 RX ADMIN — PANTOPRAZOLE SODIUM 20 MG: 20 TABLET, DELAYED RELEASE ORAL at 08:54

## 2022-01-27 RX ADMIN — DEXAMETHASONE SODIUM PHOSPHATE 9.08 MG: 4 INJECTION INTRA-ARTICULAR; INTRALESIONAL; INTRAMUSCULAR; INTRAVENOUS; SOFT TISSUE at 08:55

## 2022-01-27 RX ADMIN — FUROSEMIDE 20 MG: 10 INJECTION, SOLUTION INTRAMUSCULAR; INTRAVENOUS at 08:55

## 2022-01-27 RX ADMIN — GABAPENTIN 300 MG: 300 CAPSULE ORAL at 17:43

## 2022-01-27 RX ADMIN — GABAPENTIN 300 MG: 300 CAPSULE ORAL at 21:56

## 2022-01-27 RX ADMIN — FLUOXETINE HYDROCHLORIDE 30 MG: 10 CAPSULE ORAL at 01:14

## 2022-01-27 RX ADMIN — GABAPENTIN 300 MG: 300 CAPSULE ORAL at 08:54

## 2022-01-27 RX ADMIN — ASPIRIN 81 MG: 81 TABLET, COATED ORAL at 08:54

## 2022-01-27 RX ADMIN — DEXAMETHASONE SODIUM PHOSPHATE 9.08 MG: 4 INJECTION INTRA-ARTICULAR; INTRALESIONAL; INTRAMUSCULAR; INTRAVENOUS; SOFT TISSUE at 22:00

## 2022-01-27 RX ADMIN — DABIGATRAN ETEXILATE MESYLATE 150 MG: 150 CAPSULE ORAL at 21:54

## 2022-01-27 RX ADMIN — Medication 6 MG: at 21:54

## 2022-01-27 RX ADMIN — TRAZODONE HYDROCHLORIDE 25 MG: 50 TABLET ORAL at 21:55

## 2022-01-27 RX ADMIN — FLUOXETINE HYDROCHLORIDE 30 MG: 10 CAPSULE ORAL at 22:40

## 2022-01-27 RX ADMIN — DABIGATRAN ETEXILATE MESYLATE 150 MG: 150 CAPSULE ORAL at 09:01

## 2022-01-27 RX ADMIN — FLUTICASONE FUROATE AND VILANTEROL TRIFENATATE 1 PUFF: 100; 25 POWDER RESPIRATORY (INHALATION) at 09:01

## 2022-01-27 NOTE — RESPIRATORY THERAPY NOTE
resp care      01/27/22 1535   Respiratory Assessment   Resp Comments sat 94%  Flow decreased to 40lpm  Will decreased to 30 with next check then work on 02  Will try MF tomorrow is hfnc is at 40/40 or 40/30 with sat >88     Non-Invasive Information   O2 Interface Device HFNC prongs   Non-Invasive Ventilation Mode HFNC (High flow)   $ Pulse Oximetry Spot Check Charge Completed   Non-Invasive Settings   FiO2 (%) 60   Flow (lpm) 40   Temperature (Set) 31   Non-Invasive Readings   Heater Temperature (Obs) 31

## 2022-01-27 NOTE — PLAN OF CARE
Problem: Potential for Falls  Goal: Patient will remain free of falls  Description: INTERVENTIONS:  - Educate patient/family on patient safety including physical limitations  - Instruct patient to call for assistance with activity   - Consult OT/PT to assist with strengthening/mobility   - Keep Call bell within reach  - Keep bed low and locked with side rails adjusted as appropriate  - Keep care items and personal belongings within reach  - Initiate and maintain comfort rounds  - Make Fall Risk Sign visible to staff  - Offer Toileting in advance of need  - Initiate/Maintain alarm  - Obtain necessary fall risk management equipment  - Apply yellow socks and bracelet for high fall risk patients  - Consider moving patient to room near nurses station  Outcome: Progressing

## 2022-01-28 PROBLEM — U07.1 COVID-19 VIRUS INFECTION: Status: ACTIVE | Noted: 2022-01-28

## 2022-01-28 PROBLEM — N17.9 AKI (ACUTE KIDNEY INJURY) (HCC): Status: ACTIVE | Noted: 2022-01-28

## 2022-01-28 LAB
ANION GAP SERPL CALCULATED.3IONS-SCNC: 5 MMOL/L (ref 4–13)
ARTIFACT: PRESENT
BASOPHILS # BLD MANUAL: 0 THOUSAND/UL (ref 0–0.1)
BASOPHILS NFR MAR MANUAL: 0 % (ref 0–1)
BUN SERPL-MCNC: 39 MG/DL (ref 5–25)
CALCIUM SERPL-MCNC: 9.7 MG/DL (ref 8.3–10.1)
CHLORIDE SERPL-SCNC: 94 MMOL/L (ref 100–108)
CO2 SERPL-SCNC: 34 MMOL/L (ref 21–32)
CREAT SERPL-MCNC: 1.05 MG/DL (ref 0.6–1.3)
EOSINOPHIL # BLD MANUAL: 0 THOUSAND/UL (ref 0–0.4)
EOSINOPHIL NFR BLD MANUAL: 0 % (ref 0–6)
ERYTHROCYTE [DISTWIDTH] IN BLOOD BY AUTOMATED COUNT: 14.7 % (ref 11.6–15.1)
GFR SERPL CREATININE-BSD FRML MDRD: 55 ML/MIN/1.73SQ M
GLUCOSE SERPL-MCNC: 151 MG/DL (ref 65–140)
HCT VFR BLD AUTO: 41.7 % (ref 34.8–46.1)
HGB BLD-MCNC: 13.9 G/DL (ref 11.5–15.4)
LYMPHOCYTES # BLD AUTO: 0.12 THOUSAND/UL (ref 0.6–4.47)
LYMPHOCYTES # BLD AUTO: 1 % (ref 14–44)
MAGNESIUM SERPL-MCNC: 2 MG/DL (ref 1.6–2.6)
MCH RBC QN AUTO: 32.3 PG (ref 26.8–34.3)
MCHC RBC AUTO-ENTMCNC: 33.3 G/DL (ref 31.4–37.4)
MCV RBC AUTO: 97 FL (ref 82–98)
MONOCYTES # BLD AUTO: 0.25 THOUSAND/UL (ref 0–1.22)
MONOCYTES NFR BLD: 2 % (ref 4–12)
NEUTROPHILS # BLD MANUAL: 12.09 THOUSAND/UL (ref 1.85–7.62)
NEUTS SEG NFR BLD AUTO: 97 % (ref 43–75)
PHOSPHATE SERPL-MCNC: 4.4 MG/DL (ref 2.3–4.1)
PLATELET # BLD AUTO: 261 THOUSANDS/UL (ref 149–390)
PLATELET BLD QL SMEAR: ADEQUATE
PMV BLD AUTO: 11.5 FL (ref 8.9–12.7)
POTASSIUM SERPL-SCNC: 4.8 MMOL/L (ref 3.5–5.3)
RBC # BLD AUTO: 4.3 MILLION/UL (ref 3.81–5.12)
SODIUM SERPL-SCNC: 133 MMOL/L (ref 136–145)
WBC # BLD AUTO: 12.46 THOUSAND/UL (ref 4.31–10.16)

## 2022-01-28 PROCEDURE — 94640 AIRWAY INHALATION TREATMENT: CPT

## 2022-01-28 PROCEDURE — NC001 PR NO CHARGE: Performed by: INTERNAL MEDICINE

## 2022-01-28 PROCEDURE — 83735 ASSAY OF MAGNESIUM: CPT

## 2022-01-28 PROCEDURE — 94760 N-INVAS EAR/PLS OXIMETRY 1: CPT

## 2022-01-28 PROCEDURE — 85007 BL SMEAR W/DIFF WBC COUNT: CPT

## 2022-01-28 PROCEDURE — 84100 ASSAY OF PHOSPHORUS: CPT

## 2022-01-28 PROCEDURE — 80048 BASIC METABOLIC PNL TOTAL CA: CPT

## 2022-01-28 PROCEDURE — 85027 COMPLETE CBC AUTOMATED: CPT

## 2022-01-28 PROCEDURE — 99233 SBSQ HOSP IP/OBS HIGH 50: CPT | Performed by: INTERNAL MEDICINE

## 2022-01-28 RX ADMIN — TRAZODONE HYDROCHLORIDE 25 MG: 50 TABLET ORAL at 22:09

## 2022-01-28 RX ADMIN — ATORVASTATIN CALCIUM 40 MG: 40 TABLET, FILM COATED ORAL at 18:02

## 2022-01-28 RX ADMIN — ASPIRIN 81 MG: 81 TABLET, COATED ORAL at 10:08

## 2022-01-28 RX ADMIN — GABAPENTIN 300 MG: 300 CAPSULE ORAL at 16:32

## 2022-01-28 RX ADMIN — PANTOPRAZOLE SODIUM 20 MG: 20 TABLET, DELAYED RELEASE ORAL at 10:08

## 2022-01-28 RX ADMIN — GABAPENTIN 300 MG: 300 CAPSULE ORAL at 21:07

## 2022-01-28 RX ADMIN — DABIGATRAN ETEXILATE MESYLATE 150 MG: 150 CAPSULE ORAL at 21:07

## 2022-01-28 RX ADMIN — FLUOXETINE HYDROCHLORIDE 30 MG: 10 CAPSULE ORAL at 22:09

## 2022-01-28 RX ADMIN — DEXAMETHASONE SODIUM PHOSPHATE 9.08 MG: 4 INJECTION INTRA-ARTICULAR; INTRALESIONAL; INTRAMUSCULAR; INTRAVENOUS; SOFT TISSUE at 10:06

## 2022-01-28 RX ADMIN — DEXAMETHASONE SODIUM PHOSPHATE 9.08 MG: 4 INJECTION INTRA-ARTICULAR; INTRALESIONAL; INTRAMUSCULAR; INTRAVENOUS; SOFT TISSUE at 21:06

## 2022-01-28 RX ADMIN — FLUTICASONE FUROATE AND VILANTEROL TRIFENATATE 1 PUFF: 100; 25 POWDER RESPIRATORY (INHALATION) at 10:29

## 2022-01-28 RX ADMIN — DORNASE ALFA 2.5 MG: 1 SOLUTION RESPIRATORY (INHALATION) at 08:32

## 2022-01-28 RX ADMIN — DORNASE ALFA 2.5 MG: 1 SOLUTION RESPIRATORY (INHALATION) at 19:41

## 2022-01-28 RX ADMIN — DABIGATRAN ETEXILATE MESYLATE 150 MG: 150 CAPSULE ORAL at 10:08

## 2022-01-28 RX ADMIN — GABAPENTIN 300 MG: 300 CAPSULE ORAL at 10:08

## 2022-01-28 RX ADMIN — BARICITINIB 2 MG: 2 TABLET, FILM COATED ORAL at 10:08

## 2022-01-28 RX ADMIN — Medication 6 MG: at 22:09

## 2022-01-28 NOTE — ASSESSMENT & PLAN NOTE
Previously on Eliquis but failed  Patient has bilateral pulmonary embolism without right heart strain  Transition from heparin drip to Pradaxa  Hx stroke with BRAYDEN showing small PFO with R->L shunt, appreciate Heme/Onc recommendations      - continue Pradaxa  - heme onc was consulted  - plan to price check Pradaxa prior to discharge

## 2022-01-28 NOTE — ASSESSMENT & PLAN NOTE
Previously on Eliquis, with history of PE and CVA    Acute PE is admission   - discontinued Eliquis  - start Pradaxa

## 2022-01-28 NOTE — ASSESSMENT & PLAN NOTE
COVID test came back negative however patient likely had COVID pneumonia back in and the December when all her family members positive for COVID  Patient did not take any COVID test at the time       - continue COVID severe pathway, day 7 of baricitinib and decadron  - s/p dornase 5 days 1/26, P/F ratio 82 8 (1/22) -> 95 (1/27) improved  - will continue 5 more days of inh dornase starting 1/27-1/31 (Day 7)  - WBC: 12 4 increased from yesterday  - Temp: afebrile  - Abx: none  - Cultures:  Sputum culture: with 2+ mixed respiratory cecilia, gm stain sputum culture: with 2+ g positive cocci in clusters and 1+ Gram-negative rods, Covid/flu/RSV negative; blood cx: neg x2 72h  - Monitor WBC/temp curve

## 2022-01-28 NOTE — PROGRESS NOTES
Progress Note - Critical Care   Kali Flanagan 77 y o  female MRN: [id number]  Unit/Bed#: ICCU 208-01 Encounter: [id number]    SUBJECTIVE :  No active complaints at this time  Patient using incentive spirometry  HPI/24HR Event:  No acute overnight events  ROS  Denies chest pain, abdominal pain, SOB, palpitations, dizziness  Invasive lines and devices: Invasive Devices  Report    Peripheral Intravenous Line            Peripheral IV 22 Left Antecubital 2 days                   Physical exam  General: Alert and oriented x3  Resting comfortably in bed in no acute distress  Neuro: Alert and oriented x3  HENT: Normocephalic and atraumatic, PERRL  Heart: S1,S2, RRR, pulses intact  Lungs: Bilateral breath sounds present  No wheezes or rales appreciated  Abdomen: NT/ND, Bowel sounds present, soft  Skin: R arm bruise improved  Warm, dry skin/Incision site clean dry and intact    Vitals  Temperature:   Temp (24hrs), Av 1 °F (36 7 °C), Min:97 7 °F (36 5 °C), Max:98 6 °F (37 °C)    Current: Temperature: 98 6 °F (37 °C)    Vitals:    22 2218 22 2300 22 0118 22 0600   BP: 115/76 127/84 129/66    BP Location:  Left arm     Pulse: (!) 112 68 68    Resp: 20  20    Temp: 98 °F (36 7 °C) 98 6 °F (37 °C)     TempSrc: Oral Oral     SpO2: 92% 95% 96%    Weight:    95 kg (209 lb 7 oz)   Height:                 Labs:   Results from last 7 days   Lab Units 22  0539 22  0447 22  0433 22  0518 22  0518   WBC Thousand/uL 12 46* 11 74* 12 40*   < > 11 06*   HEMOGLOBIN g/dL 13 9 12 8 13 0   < > 13 7   HEMATOCRIT % 41 7 39 1 39 3   < > 42 0   PLATELETS Thousands/uL 261 221 195   < > 188   NEUTROS PCT %  --  91* 89*  --  90*   MONOS PCT %  --  4 5   < > 4   MONO PCT % 2*  --   --   --   --     < > = values in this interval not displayed        Results from last 7 days   Lab Units 22  0539 22  0447 22  0433 22  0524 22  1415   SODIUM mmol/L 133* 135* 136   < > 131*   POTASSIUM mmol/L 4 8 4 7 4 8   < > 3 3*   CHLORIDE mmol/L 94* 99* 100   < > 94*   CO2 mmol/L 34* 32 32   < > 28   BUN mg/dL 39* 38* 33*   < > 23   CREATININE mg/dL 1 05 0 94 1 00   < > 1 40*   CALCIUM mg/dL 9 7 9 7 9 0   < > 9 0   ALK PHOS U/L  --   --   --   --  71   ALT U/L  --   --   --   --  13   AST U/L  --   --   --   --  34    < > = values in this interval not displayed  Results from last 7 days   Lab Units 01/28/22  0539 01/27/22 0447 01/26/22  0433   MAGNESIUM mg/dL 2 0 2 2 1 3*     Results from last 7 days   Lab Units 01/28/22  0539 01/27/22 0447 01/26/22  0433   PHOSPHORUS mg/dL 4 4* 4 1 3 4      Results from last 7 days   Lab Units 01/26/22  0433 01/25/22 2005 01/25/22  1347 01/21/22  2345 01/21/22  1552   INR   --   --   --   --  2 89*   PTT seconds 135* 58* 89*   < >  --     < > = values in this interval not displayed  Results from last 7 days   Lab Units 01/21/22  1415   LACTIC ACID mmol/L 2 0       Other Labs    Intake and Outputs:  I/O       01/22 0701  01/23 0700 01/23 0701  01/24 0700 01/24 0701 01/25 0700    P  O   420     I V  (mL/kg) 125 1 (1 4)  274 1 (3)    IV Piggyback       Total Intake(mL/kg) 125 1 (1 4) 420 (4 6) 274 1 (3)    Urine (mL/kg/hr) 500 (0 2) 1900 (0 9) 150 (0 3)    Total Output 500 1900 150    Net -374 9 -1480 +124 1                 Imaging Studies      Assessment & Plan:   Patient is a 69F with hx of prothrombin gene mutation on Eliquis, hx of PE, hx of CVA, GERD, HTN, depressive disorder, dyslipidemia who is under critical care service for acute hypoxic respiratory failure, COVID pneumonia, bilateral pulmonary embolism with right ventricular strain (likely 2/2 COVID PNA), volume overload, CHAKA  -Neuro:   Dx: Hx stroke (was previously on eliquis), depression  - continue pradaxa, dc heparin drip and eliquis  - on home fluoxetine and trazodone  - No sedation  - Delirium ppx: CAM-ICU, sleep hygiene      CV:   - Dx:  Intermittent soft BP - resolved, Volume overload, elevated BNP, possibly acute diastolic CHF EF 78%, essential HTN  - will hold atenolol 25 mg since yesterday, continue to hold lasix  - s/p 20 mg IV lasix AM dose , then lasix held in PM 1/27  - total net neg of -5L  - RV strain seen on CT  - Echo 1/22: EF 11% normal systolic function, RV mildly dilated with RV systolic function reduced, PASP 44 mildly increased  - Hemodynamic support: none  - OP: Obtains echo in 3-6 months  - MAP goal > 65   - monitor I/O       Lung:   - Dx: Acute hypoxic respiratory failure, multifactorial 2/2 COVID PNA, bilateral PE with RV strain, possible diastolic CHF; suspected COPD   - continue incentive spirometry  - on HFNC 60%/40L, Keep O2 sat >88% - try to wean FiO2 and possibly transition to midflow  - continue COVID severe pathway, day 7 of baricitinib and decadron  - s/p dornase 5 days 1/26, P/F ratio 82 8 (1/22) -> 95 (1/27) improved  - will continue 5 more days of inh dornase starting 1/27-1/31 (Day 7)  - CT with R heart strain, not seen on echo  - Heparin drip for PE -> transition to pradaxa later for anticoagulation, appreciate hematology recommendations  - Breo and PRN albuterol inhalers   - plan to obtain outpatient PFT and outpatient Echo   - Continue Respiratory Protocol       GI:   - Dx: none  - on protonix      FEN:   - Fluids: no maintenance fluids  - Electrolytes: trend and replete as needed Mg>2, phos>3, K>4  - Nutrition: Continue cardiovascular diet      :   - Dx: CHAKA - resolved   - Cr 1 05  - Hold Lasix  - net total output 5L during admission  - I/O last 24 hours:   In: 80 [P O :780]  Out: 1000 [Urine:1000]  - no white    ID:   - Dx: COVID PNA  - WBC: 12 4 increased from yesterday  - Temp: afebrile  - Abx: none  - Cultures:  Sputum culture: with 2+ mixed respiratory cecilia, gm stain sputum culture: with 2+ g positive cocci in clusters and 1+ Gram-negative rods, Covid/flu/RSV negative; blood cx: neg x2 72h  - Monitor WBC/temp curve    Heme:   - Dx: PE with Right heart strain afte COVID infection,  Prothrombin Gene mutation, history of stroke   - DVT ppx: On Pradaxa for PE , heparin d/c'd  - hx stroke with BRAYDEN showing small PFO with R->L shunt, appreciate Heme/Onc recommendations      Endo:   - Dx: none  - at goal today  - Goal -180    Msk/Skin:   - PT/OT  - Turning/repositioning  - home gabapentin     Disposition: SD1  Non-Invasive/Invasive Ventilation Settings:  Respiratory  Report   Lab Data (Last 4 hours)    None         O2/Vent Data (Last 4 hours)    None              Lab Results   Component Value Date    PHART 7 437 01/27/2022    CJT0FLA 43 7 01/27/2022    PO2ART 67 0 (L) 01/27/2022    CEQ8IWX 28 8 (H) 01/27/2022    BEART 4 1 01/27/2022    SOURCE Radial, Right 01/27/2022          Imaging:   VAS lower limb venous duplex study, complete bilateral   Final Result by Diogo Curiel DO (01/24 2051)      CTA ED chest PE study   Final Result by Castro Palafox DO (01/21 3949)      Multiple bilateral pulmonary emboli, as described with evidence of right heart strain  Cardiomegaly, moderate to severe multiple focal and confluent areas of groundglass and alveolar opacities seen in the bilateral lungs which could represent edema such as in the setting of fluid overload/CHF and/or infectious or inflammatory pneumonitis  Depending on the clinical setting  Correlation with the patient's symptoms, and laboratory values recommended  Mild scattered pulmonary emphysematous changes  Coronary atherosclerosis, shotty mediastinal and hilar lymph nodes, and other findings as above  I personally discussed this study with Dr Luke Tinsley on 1/21/2022 at 10:43 PM                Workstation performed: [id number]         XR chest 1 view portable   ED Interpretation by Yuri Bonner MD (01/21 4052)   LEFT lower lobe PNA  Final Result by Dirk Mcmillan MD (01/21 4758)      Left-sided pneumonia    Recommend follow-up to ensure complete clearing after treatment  Workstation performed: [id number]           I have personally reviewed pertinent reports  Micro:  Lab Results   Component Value Date    BLOODCX No Growth After 5 Days  01/21/2022    BLOODCX No Growth After 5 Days  01/21/2022    WOUNDCULT No growth 04/11/2017    SPUTUMCULTUR 2+ Growth of  01/21/2022         Allergies: Allergies   Allergen Reactions    Percocet [Oxycodone-Acetaminophen] Hives    Amlodipine     Percocet  [Oxycodone-Acetaminophen]          Medications:   Scheduled Meds:  Current Facility-Administered Medications   Medication Dose Route Frequency Provider Last Rate    acetaminophen  650 mg Oral Q6H PRN Han Landers MD      albuterol  2 5 mg Nebulization Q4H PRN Han Landers MD      aspirin  81 mg Oral Daily Han Landers MD      atorvastatin  40 mg Oral QPM Han Landers MD      Baricitinib  2 mg Oral Q24H Janece Bookmarkus, DO      benzonatate  100 mg Oral TID PRN Han Landers MD      dabigatran etexilate  150 mg Oral Q12H [facility] Sylvia Oconnor MD      dexamethasone  0 1 mg/kg Intravenous Q12H Janece Elbabindjohan, DO      dornase madiha  2 5 mg Inhalation BID Star Salmeron MD      FLUoxetine  30 mg Oral Daily Han Landers MD      fluticasone-vilanterol  1 puff Inhalation Daily Leeanna Arias MD      gabapentin  300 mg Oral TID Han Landers MD      melatonin  6 mg Oral HS Mimi Mcfarland MD      ondansetron  4 mg Intravenous Q6H PRN Han Landers MD      pantoprazole  20 mg Oral Daily Han Landers MD      traZODone  25 mg Oral HS Juliette Dover MD       Continuous Infusions:   PRN Meds:  acetaminophen, 650 mg, Q6H PRN  albuterol, 2 5 mg, Q4H PRN  benzonatate, 100 mg, TID PRN  ondansetron, 4 mg, Q6H PRN        Counseling / Coordination of Care  Total Critical Care time spent 25 minutes excluding procedures, teaching and family updates         Code Status: Level 1 - Full Code     Portions of the record may have been created with voice recognition software  Occasional wrong word or "sound a like" substitutions may have occurred due to the inherent limitations of voice recognition software  Read the chart carefully and recognize, using context, where substitutions have occurred       Breanna Clinton MD  Internal Medicine Resident, PGY1  8:26 AM

## 2022-01-28 NOTE — UTILIZATION REVIEW
Continued Stay Review    Date: 01/27/2022                        Current Patient Class: Inpatient  Current Level of Care: Level 2 stepdown    HPI:66 y o  female initially admitted on 01/21/2022     Assessment/Plan: No acute overnight events  Currently on on HFNC 60%/40L, Keep O2 sat >88% - try to wean FiO2 and possibly transition to midflow  Cont COVID severe pathway: Bariticinib, decadron  Cont 5 more days of Dornase until 01/31  Cot pradaxa  Cont to hold atenolol and lasix d/t intermittent soft BP  Continue Breo and p r n  Albuterol  Continue p r n  Diuresis  Continue incentive spirometry and self proning    Vital Signs: /63 (BP Location: Right arm)   Pulse 84   Temp 98 7 °F (37 1 °C) (Oral)   Resp 16   Ht 5' 3" (1 6 m)   Wt 95 kg (209 lb 7 oz)   SpO2 97%   BMI 37 10 kg/m²       Pertinent Labs/Diagnostic Results:       Results from last 7 days   Lab Units 01/28/22  0539 01/27/22  0447 01/26/22  0433 01/25/22  0518 01/25/22  0518 01/23/22  0449 01/23/22  0449   WBC Thousand/uL 12 46* 11 74* 12 40*   < > 11 06*   < > 13 96*   HEMOGLOBIN g/dL 13 9 12 8 13 0  --  13 7  --  12 3   HEMATOCRIT % 41 7 39 1 39 3  --  42 0  --  37 1   PLATELETS Thousands/uL 261 221 195   < > 188   < > 151   NEUTROS ABS Thousands/µL  --  10 67* 11 10*  --  10 02*   < > 12 16*    < > = values in this interval not displayed           Results from last 7 days   Lab Units 01/28/22  0539 01/27/22  0447 01/26/22  0433 01/25/22  0000 01/23/22  0449 01/22/22  0524 01/22/22  0524   SODIUM mmol/L 133* 135* 136 137 137   < > 130*   POTASSIUM mmol/L 4 8 4 7 4 8 4 7 4 5   < > 4 6   CHLORIDE mmol/L 94* 99* 100 100 101   < > 94*   CO2 mmol/L 34* 32 32 33* 29   < > 29   ANION GAP mmol/L 5 4 4 4 7   < > 7   BUN mg/dL 39* 38* 33* 30* 29*   < > 29*   CREATININE mg/dL 1 05 0 94 1 00 1 04 1 18   < > 1 38*   EGFR ml/min/1 73sq m 55 63 58 56 48   < > 39   CALCIUM mg/dL 9 7 9 7 9 0 9 0 8 6   < > 9 3   MAGNESIUM mg/dL 2 0 2 2 1 3* 1 5*  --   --  1 7 PHOSPHORUS mg/dL 4 4* 4 1 3 4 3 4  --   --   --     < > = values in this interval not displayed               Results from last 7 days   Lab Units 01/28/22  0539 01/27/22  0447 01/26/22  0433 01/25/22  0000 01/23/22  0449 01/22/22  0524   GLUCOSE RANDOM mg/dL 151* 157* 169* 149* 151* 136     Results from last 7 days   Lab Units 01/27/22  0926 01/22/22  0907   PH ART  7 437 7 408   PCO2 ART mm Hg 43 7 42 8   PO2 ART mm Hg 67 0* 82 8   HCO3 ART mmol/L 28 8* 26 4   BASE EXC ART mmol/L 4 1 1 5   O2 CONTENT ART mL/dL 17 8 18 8   O2 HGB, ARTERIAL % 91 5* 94 6   ABG SOURCE  Radial, Right Radial, Right   NON VENT TYPE HFNC  HFNC Flow HFNC Flow   HFNC FLOW LPM  50 55                 Results from last 7 days   Lab Units 01/21/22  1944   HS TNI 4HR ng/L 81   HSTNI D4 ng/L 1         Results from last 7 days   Lab Units 01/26/22  0433 01/25/22 2005 01/25/22  1347   PTT seconds 135* 58* 89*         Results from last 7 days   Lab Units 01/22/22  0524   PROCALCITONIN ng/ml 0 21       Results from last 7 days   Lab Units 01/21/22  2209   STREP PNEUMONIAE ANTIGEN, URINE  Negative   LEGIONELLA URINARY ANTIGEN  Negative       Results from last 7 days   Lab Units 01/21/22  2342   SPUTUM CULTURE  2+ Growth of    GRAM STAIN RESULT  No Epithelial cells seen*  2+ Gram positive cocci in clusters*  1+ Gram negative rods*  No polys seen*                 Medications:   Scheduled Medications:  aspirin, 81 mg, Oral, Daily  atorvastatin, 40 mg, Oral, QPM  Baricitinib, 2 mg, Oral, Q24H  dabigatran etexilate, 150 mg, Oral, Q12H [facility]  dexamethasone, 0 1 mg/kg, Intravenous, Q12H  dornase madiha, 2 5 mg, Inhalation, BID  FLUoxetine, 30 mg, Oral, Daily  fluticasone-vilanterol, 1 puff, Inhalation, Daily  gabapentin, 300 mg, Oral, TID  melatonin, 6 mg, Oral, HS  pantoprazole, 20 mg, Oral, Daily  traZODone, 25 mg, Oral, HS      Continuous IV Infusions: none     PRN Meds:  acetaminophen, 650 mg, Oral, Q6H PRN  albuterol, 2 5 mg, Nebulization, Q4H PRN  benzonatate, 100 mg, Oral, TID PRN  ondansetron, 4 mg, Intravenous, Q6H PRN        Discharge Plan: TBD    Network Utilization Review Department  ATTENTION: Please call with any questions or concerns to [phone] and carefully listen to the prompts so that you are directed to the right person  All voicemails are confidential   Candia Siemens all requests for admission clinical reviews, approved or denied determinations and any other requests to dedicated fax number below belonging to the campus where the patient is receiving treatment   List of dedicated fax numbers for the Facilities:  [address] DENIALS (Administrative/Medical Necessity) [phone]   [address] (Maternity/NICU/Pediatrics) [phone]   [address]  [address] [address] [place] [id number] [place] [address] [address] [facility] [place] [address] [phone]

## 2022-01-28 NOTE — ASSESSMENT & PLAN NOTE
Patient midly hypotensive during ICU course  Patient diuresed during ICU course in setting of vol overload and elevated BNP with possible acute diastolic CHF  Atenolol home med was held and lasix discontinued       - will hold atenolol 25 mg since yesterday, continue to hold lasix  - s/p diuresis on lasix 20 mg BID, discontinued  - total net neg of -5L  - RV strain seen on CT  - Echo 1/22: EF 45% normal systolic function, RV mildly dilated with RV systolic function reduced, PASP 44 mildly increased  - Hemodynamic support: none  - OP: Obtains echo in 3-6 months  - MAP goal > 65   - monitor I/O

## 2022-01-28 NOTE — ASSESSMENT & PLAN NOTE
Acute hypoxic respiratory failure likely multifactorial 2/2 COVID PNA, bilateral PE with RV strain, possible diastolic CHF  Patient is a former smoker with suspected COPD       - continue incentive spirometry  - on HFNC 60%/40L, Keep O2 sat >88% - try to wean FiO2 and possibly transition to midflow  - continue COVID severe pathway, day 7 of baricitinib and decadron  - s/p dornase 5 days 1/26, P/F ratio 82 8 (1/22) -> 95 (1/27) improved  - will continue 5 more days of inh dornase starting 1/27-1/31 (Day 7)  - CT with R heart strain, not seen on echo  - Heparin drip for PE -> transition to pradaxa later for anticoagulation, appreciate hematology recommendations  - Breo and PRN albuterol inhalers   - plan to obtain outpatient PFT and outpatient Echo in 3-6 months  - Continue Respiratory Protocol

## 2022-01-28 NOTE — PROGRESS NOTES
Code Status: Level 1 - Full Code  POA:    POLST:      Reason for ICU admission:   Acute respiratory failure with hypoxia    Active problems:   Principal Problem:    Community acquired pneumonia of left lower lobe of lung  Active Problems:    Benign essential hypertension    Depressive disorder    Prothrombin mutation ([facility] )    Hx of pulmonary embolus    Obesity, morbid ([facility] )    Acute respiratory failure with hypoxia (HCC)    Hypomagnesemia    Hypokalemia  Resolved Problems:    * No resolved hospital problems  *    COVID-19 virus infection  Assessment & Plan  COVID test came back negative however patient likely had COVID pneumonia back in and the December when all her family members positive for COVID  Patient did not take any COVID test at the time  - continue COVID severe pathway, day 7 of baricitinib and decadron  - s/p dornase 5 days 1/26, P/F ratio 82 8 (1/22) -> 95 (1/27) improved  - will continue 5 more days of inh dornase starting 1/27-1/31 (Day 7)  - WBC: 12 4 increased from yesterday  - Temp: afebrile  - Abx: none  - Cultures:  Sputum culture: with 2+ mixed respiratory cecilia, gm stain sputum culture: with 2+ g positive cocci in clusters and 1+ Gram-negative rods, Covid/flu/RSV negative; blood cx: neg x2 72h  - Monitor WBC/temp curve    CHAKA (acute kidney injury) ([facility] )  Assessment & Plan  CHAKA with diuresis - resolved   - Cr 1 05  - Hold Lasix  - net total output 5L during admission  - monitor I/O and BMP    Acute respiratory failure with hypoxia (HCC)  Assessment & Plan  Acute hypoxic respiratory failure likely multifactorial 2/2 COVID PNA, bilateral PE with RV strain, possible diastolic CHF  Patient is a former smoker with suspected COPD       - continue incentive spirometry  - on HFNC 60%/40L, Keep O2 sat >88% - try to wean FiO2 and possibly transition to midflow  - continue COVID severe pathway, day 7 of baricitinib and decadron  - s/p dornase 5 days 1/26, P/F ratio 82 8 (1/22) -> 95 (1/27) improved  - will continue 5 more days of inh dornase starting 1/27-1/31 (Day 7)  - CT with R heart strain, not seen on echo  - Heparin drip for PE -> transition to pradaxa later for anticoagulation, appreciate hematology recommendations  - Breo and PRN albuterol inhalers   - plan to obtain outpatient PFT and outpatient Echo in 3-6 months  - Continue Respiratory Protocol     Hx of pulmonary embolus  Assessment & Plan  Previously on Eliquis but failed  Patient has bilateral pulmonary embolism without right heart strain  Transition from heparin drip to Pradaxa  Hx stroke with BRAYDEN showing small PFO with R->L shunt, appreciate Heme/Onc recommendations  - continue Pradaxa  - heme onc was consulted  - plan to price check Pradaxa prior to discharge    Prothrombin mutation [facility])  Assessment & Plan  Previously on Eliquis, with history of PE and CVA  Acute PE is admission   - discontinued Eliquis  - start Pradaxa    Depressive disorder  Assessment & Plan  - on home fluoxetine and trazodone    Benign essential hypertension  Assessment & Plan  Patient midly hypotensive during ICU course  Patient diuresed during ICU course in setting of vol overload and elevated BNP with possible acute diastolic CHF  Atenolol home med was held and lasix discontinued  - will hold atenolol 25 mg since yesterday, continue to hold lasix  - s/p diuresis on lasix 20 mg BID, discontinued  - total net neg of -5L  - RV strain seen on CT  - Echo 1/22: EF 10% normal systolic function, RV mildly dilated with RV systolic function reduced, PASP 44 mildly increased  - Hemodynamic support: none  - OP: Obtains echo in 3-6 months  - MAP goal > 65   - monitor I/O        Consultants:   pulmonology    History of Present Illness:   " Marco Antonio Licona is a 77 y o  female with history of PE, prothrombin gene mutation on Eliquis, CVA, smoker, GERD, HTN, depressive disorder, dyslipidemia who presents after 2-3 weeks of not feeling well    She states that between Avila and new year's she started to have severe fatigue, cough, increased shortness of breath  Around new year's her entire family tested positive for COVID, she assumed she had COVID so she did not take a test   She is vaccinated x2 against COVID, last in April 2021  She is scheduled for booster next week  Her symptoms continued to worsen over the past couple weeks, now presenting to the hospital with severe shortness of breath and found to be hypoxic  She is now on high-flow nasal cannula oxygen  She has cough intermittently productive of phlegm, most recent expectoration in the emergency room had some blood in it  She denies any fevers or chills  She denies any chest pain  She denies any wheezing  She denies any lower extremity edema or calf pain/tenderness  She has not had any nausea or vomiting  She does report some constipation  She states she has not been eating well she just has not had an appetite  She has a history of pulmonary embolism and IVC filter with retrieval several years ago  She was found to have prothrombin gene mutation and takes Eliquis daily  She states she has not missed any doses of Eliquis  She is a former smoker who quit in October of 2021  She does not endorse any known history of lung disease "    Patient was admitted under step down 1  Summary of clinical course:   Patient was found to have CTA on 1/21 with multiple bilateral pulmonary embolism with right heart strain  She was started on a heparin drip  Patient was started on severe COVID pathway on baricitinib, decadron, inhaled dornase alpha on maximum HFNC  On 1/22, echo showed mild dilated on RV with LVEF of 55% and PA of 44 mmHg  Patient was also diuresed with suspicion volume overload in setting possible CHF exacerbation in setting of elevated BNP  Patient was mildly hypotensive which resolved after atenolol Lasix was discontinued  On 1/27 patient was transitioned from heparin drip to Pradaxa    Patient was also weaned from max high-flow to 40 L on 60% high-flow nasal cannula  Patient was transferred from the Critical Care Service on SD1 to SD2  Recent or scheduled procedures:   None this admission  Plans for colonoscopy in the future as per judy  Outstanding/pending diagnostics:   nonr    Cultures:   none       Mobilization Plan:   PT OT    Nutrition Plan:   CV diet    Invasive Devices Review  Invasive Devices  Report    Peripheral Intravenous Line            Peripheral IV 01/25/22 Left Antecubital 3 days                Rationale for remaining devices: none    VTE Pharmacologic Prophylaxis: Dabigatran (Pradaxa)  VTE Mechanical Prophylaxis: sequential compression device    Discharge Plan:   Patient should be ready for discharge to home after transitioned off high oxygen requirement and severe covid pathway  Initial Physical Therapy Recommendations: none  Initial Occupational Therapy Recommendations: none  Initial /Plan: none    Home medications that are not reordered and reason why:   Eliquis, failed Eliquis now on Pradaxa  Atenolol, blood pressure controlled / mildly soft blood pressures in ICCU    Spoke with Dr Shital Mederos regarding transfer  Please contact critical care via [employer] with any questions or concerns  Portions of the record may have been created with voice recognition software  Occasional wrong word or "sound a like" substitutions may have occurred due to the inherent limitations of voice recognition software    Read the chart carefully and recognize, using context, where substitutions have occurred    Lucy Lehman MD  Internal Medicine Resident, PGY1  5:26 PM

## 2022-01-28 NOTE — ASSESSMENT & PLAN NOTE
CHAKA with diuresis - resolved   - Cr 1 05  - Hold Lasix  - net total output 5L during admission  - monitor I/O and BMP

## 2022-01-28 NOTE — QUICK NOTE
Patient's primary  was called today for update of clinical progress and plans of care  Phone call not picked up  VM left  Will try again at later time

## 2022-01-29 LAB
ANION GAP SERPL CALCULATED.3IONS-SCNC: 6 MMOL/L (ref 4–13)
BASOPHILS # BLD AUTO: 0.01 THOUSANDS/ΜL (ref 0–0.1)
BASOPHILS NFR BLD AUTO: 0 % (ref 0–1)
BUN SERPL-MCNC: 37 MG/DL (ref 5–25)
CALCIUM SERPL-MCNC: 9.8 MG/DL (ref 8.3–10.1)
CHLORIDE SERPL-SCNC: 98 MMOL/L (ref 100–108)
CO2 SERPL-SCNC: 30 MMOL/L (ref 21–32)
CREAT SERPL-MCNC: 0.94 MG/DL (ref 0.6–1.3)
EOSINOPHIL # BLD AUTO: 0 THOUSAND/ΜL (ref 0–0.61)
EOSINOPHIL NFR BLD AUTO: 0 % (ref 0–6)
ERYTHROCYTE [DISTWIDTH] IN BLOOD BY AUTOMATED COUNT: 14.6 % (ref 11.6–15.1)
GFR SERPL CREATININE-BSD FRML MDRD: 63 ML/MIN/1.73SQ M
GLUCOSE SERPL-MCNC: 145 MG/DL (ref 65–140)
HCT VFR BLD AUTO: 40.8 % (ref 34.8–46.1)
HGB BLD-MCNC: 13.3 G/DL (ref 11.5–15.4)
IMM GRANULOCYTES # BLD AUTO: 0.12 THOUSAND/UL (ref 0–0.2)
IMM GRANULOCYTES NFR BLD AUTO: 1 % (ref 0–2)
LYMPHOCYTES # BLD AUTO: 0.43 THOUSANDS/ΜL (ref 0.6–4.47)
LYMPHOCYTES NFR BLD AUTO: 4 % (ref 14–44)
MAGNESIUM SERPL-MCNC: 1.9 MG/DL (ref 1.6–2.6)
MCH RBC QN AUTO: 32 PG (ref 26.8–34.3)
MCHC RBC AUTO-ENTMCNC: 32.6 G/DL (ref 31.4–37.4)
MCV RBC AUTO: 98 FL (ref 82–98)
MONOCYTES # BLD AUTO: 0.81 THOUSAND/ΜL (ref 0.17–1.22)
MONOCYTES NFR BLD AUTO: 7 % (ref 4–12)
NEUTROPHILS # BLD AUTO: 10.76 THOUSANDS/ΜL (ref 1.85–7.62)
NEUTS SEG NFR BLD AUTO: 88 % (ref 43–75)
NRBC BLD AUTO-RTO: 0 /100 WBCS
PHOSPHATE SERPL-MCNC: 4.3 MG/DL (ref 2.3–4.1)
PLATELET # BLD AUTO: 285 THOUSANDS/UL (ref 149–390)
PMV BLD AUTO: 11.3 FL (ref 8.9–12.7)
POTASSIUM SERPL-SCNC: 5 MMOL/L (ref 3.5–5.3)
RBC # BLD AUTO: 4.15 MILLION/UL (ref 3.81–5.12)
SODIUM SERPL-SCNC: 134 MMOL/L (ref 136–145)
WBC # BLD AUTO: 12.13 THOUSAND/UL (ref 4.31–10.16)

## 2022-01-29 PROCEDURE — 97163 PT EVAL HIGH COMPLEX 45 MIN: CPT

## 2022-01-29 PROCEDURE — 97116 GAIT TRAINING THERAPY: CPT

## 2022-01-29 PROCEDURE — 94640 AIRWAY INHALATION TREATMENT: CPT

## 2022-01-29 PROCEDURE — 85025 COMPLETE CBC W/AUTO DIFF WBC: CPT

## 2022-01-29 PROCEDURE — 83735 ASSAY OF MAGNESIUM: CPT

## 2022-01-29 PROCEDURE — 84100 ASSAY OF PHOSPHORUS: CPT

## 2022-01-29 PROCEDURE — 80048 BASIC METABOLIC PNL TOTAL CA: CPT

## 2022-01-29 PROCEDURE — 99221 1ST HOSP IP/OBS SF/LOW 40: CPT | Performed by: INTERNAL MEDICINE

## 2022-01-29 PROCEDURE — 99232 SBSQ HOSP IP/OBS MODERATE 35: CPT | Performed by: FAMILY MEDICINE

## 2022-01-29 PROCEDURE — 94760 N-INVAS EAR/PLS OXIMETRY 1: CPT

## 2022-01-29 RX ADMIN — DEXAMETHASONE SODIUM PHOSPHATE 9.08 MG: 4 INJECTION INTRA-ARTICULAR; INTRALESIONAL; INTRAMUSCULAR; INTRAVENOUS; SOFT TISSUE at 09:28

## 2022-01-29 RX ADMIN — DEXAMETHASONE SODIUM PHOSPHATE 9.08 MG: 4 INJECTION INTRA-ARTICULAR; INTRALESIONAL; INTRAMUSCULAR; INTRAVENOUS; SOFT TISSUE at 21:26

## 2022-01-29 RX ADMIN — GABAPENTIN 300 MG: 300 CAPSULE ORAL at 15:04

## 2022-01-29 RX ADMIN — ATORVASTATIN CALCIUM 40 MG: 40 TABLET, FILM COATED ORAL at 17:49

## 2022-01-29 RX ADMIN — TRAZODONE HYDROCHLORIDE 25 MG: 50 TABLET ORAL at 21:26

## 2022-01-29 RX ADMIN — FLUTICASONE FUROATE AND VILANTEROL TRIFENATATE 1 PUFF: 100; 25 POWDER RESPIRATORY (INHALATION) at 17:49

## 2022-01-29 RX ADMIN — FLUOXETINE HYDROCHLORIDE 30 MG: 10 CAPSULE ORAL at 21:26

## 2022-01-29 RX ADMIN — BARICITINIB 2 MG: 2 TABLET, FILM COATED ORAL at 09:29

## 2022-01-29 RX ADMIN — DABIGATRAN ETEXILATE MESYLATE 150 MG: 150 CAPSULE ORAL at 21:27

## 2022-01-29 RX ADMIN — PANTOPRAZOLE SODIUM 20 MG: 20 TABLET, DELAYED RELEASE ORAL at 09:29

## 2022-01-29 RX ADMIN — GABAPENTIN 300 MG: 300 CAPSULE ORAL at 09:29

## 2022-01-29 RX ADMIN — ASPIRIN 81 MG: 81 TABLET, COATED ORAL at 09:29

## 2022-01-29 RX ADMIN — Medication 6 MG: at 21:26

## 2022-01-29 RX ADMIN — DORNASE ALFA 2.5 MG: 1 SOLUTION RESPIRATORY (INHALATION) at 21:30

## 2022-01-29 RX ADMIN — DABIGATRAN ETEXILATE MESYLATE 150 MG: 150 CAPSULE ORAL at 15:04

## 2022-01-29 RX ADMIN — GABAPENTIN 300 MG: 300 CAPSULE ORAL at 21:26

## 2022-01-29 NOTE — PHYSICAL THERAPY NOTE
Physical Therapy Evaluation    Patient's Name: Stanley Massey    Admitting Diagnosis  CHF (congestive heart failure) ([place] [address] ) [I50 9]  Pneumonia [J18 9]  SOB (shortness of breath) [R06 02]  Hypoxia [R09 02]    Problem List  Patient Active Problem List   Diagnosis    Osteoarthritis of left hip    Benign essential hypertension    Chronic insomnia    Depressive disorder    Dyslipidemia    Dyspepsia    Gastroesophageal reflux disease without esophagitis    Hip arthritis    Hyperlipidemia    Irritable bowel syndrome    Hypercoagulable state ([place] [address] )    Spinal stenosis of lumbar region    Smoking    Vitamin D deficiency    Prothrombin mutation ([place] [address] )    Bronchitis    Stroke ([place] [address] )    Hx of pulmonary embolus    Obesity, morbid ([place] [address] )    Acute respiratory failure with hypoxia ([place] [address] )    Community acquired pneumonia of left lower lobe of lung    Hypomagnesemia    Hypokalemia    CHAKA (acute kidney injury) ([place] [address] )    COVID-19 virus infection       Past Medical History  Past Medical History:   Diagnosis Date    Acid reflux     Anxiety     Arthritis     Back pain     Carpal tunnel syndrome, bilateral     Depression     Endometrial cancer ([place] [address] ) 1997    Hiatal hernia     History of DVT of lower extremity     Right leg-had IVc filter  removal today-6/6/2016    History of pulmonary embolism     History of uterine cancer     Was stage 3- uterine, ovaries-hysterectomy    Hypercholesteremia     Hypertension     Left leg pain     Lumbar herniated disc     Tumor cells, benign     In right atrium   Probably congenital     Varicose vein of leg     Wears glasses        Past Surgical History  Past Surgical History:   Procedure Laterality Date    COLONOSCOPY      DENTAL SURGERY      DILATION AND CURETTAGE OF UTERUS      ESOPHAGOGASTRODUODENOSCOPY      FOOT SURGERY      LAST ASSESSED: [id number]    HYSTERECTOMY  1997    uterine cancer-hx    IR STROKE ALERT  10/20/2021    JOINT REPLACEMENT      b/l   THR  OOPHORECTOMY Bilateral 1997    MS COLONOSCOPY FLX DX W/COLLJ SPEC WHEN PFRMD N/A 6/14/2017    Procedure: EGD AND COLONOSCOPY;  Surgeon: Fernando Mireles MD;  [facility]; Service: Gastroenterology    MS TOTAL HIP ARTHROPLASTY Left 4/13/2016    Procedure: ARTHROPLASTY HIP TOTAL;  Surgeon: Alfredo Sawyer DO;  [facility];  Service: Orthopedics    TONSILLECTOMY AND ADENOIDECTOMY      TOTAL HIP ARTHROPLASTY Right     LAST ASSESSED: [id number]      01/29/22 1144   PT Last Visit   PT Visit Date 01/29/22   Note Type   Note type Evaluation  (+ treatment)   Pain Assessment   Pain Assessment Tool 0-10   Pain Score No Pain   Restrictions/Precautions   Weight Bearing Precautions Per Order No   Other Precautions Multiple lines;O2;Fall Risk  (HFNC)   Home Living   Type of [place] [address] to live on main level with bedroom/bathroom;Stairs to enter with rails  (3 SRI)   Bathroom Shower/Tub Tub/shower unit  (reports plans to create into walkin shower)   Bathroom Toilet Standard   Bathroom Equipment Shower chair;Commode   Home Equipment Walker;Cane  (primarily uses SPC; has RW from previous ELICEO)   Prior Function   Level of [place] Independent with ADLs and functional mobility   Lives With Family;Daughter  (RADHA; 2 granddaughters; alone for short periods of time)   Receives Help From Family   ADL Assistance Independent  (prior to Dec when issues w/ breathing started)   IADLs Independent   Falls in the last 6 months 0   Comments PTA pt was Mary w/ functional mobility community distances w/ SPC, (+)   Pt reports recent need for assistance w/ bathing since she has been having issues w/ breathing (started Dec 2021); dtr washes pt's hair and helps her get in/out of shower  Prior to Dec 2021 pt I w/ ADL's     General   Family/Caregiver Present No   Cognition   Overall Cognitive Status WFL   Arousal/Participation Alert   Orientation Level Oriented X4   Memory Decreased recall of precautions Following Commands Follows all commands and directions without difficulty   Comments pleasant + cooperative, some safety deficits noted (unhooked HFNC to go to the bathroom)   Subjective   Subjective "When can I go home?"   RLE Assessment   RLE Assessment   (grossly 4/5)   LLE Assessment   LLE Assessment   (grossly 4/5)   Coordination   Movements are Fluid and Coordinated 1   Bed Mobility   Supine to Sit 6  Modified independent   Additional items Increased time required   Sit to Supine Unable to assess   Additional Comments Pt greeted in supine  Transfers   Sit to Stand 5  Supervision   Additional items Increased time required   Stand to Sit 5  Supervision   Additional items Increased time required   Stand pivot 5  Supervision   Additional items Increased time required   Toilet transfer 5  Supervision   Additional items Increased time required;Verbal cues;Standard toilet  (required heavy use of grab bar)   Additional Comments SPC / recommended pt use BSC over toilet at home due to difficulty getting up from low toilet   Ambulation/Elevation   Gait pattern Excessively slow; Short stride;Decreased foot clearance   Gait Assistance 5  Supervision   Additional items Verbal cues; Tactile cues   Assistive Device SPC   Distance 8'x2 to/from bathroom   Balance   Static Sitting Good   Dynamic Sitting Fair +   Static Standing Fair   Dynamic Standing Fair -   Ambulatory   (SPC)   Endurance Deficit   Endurance Deficit Yes   Endurance Deficit Description desaturation on HFNC to 84% while going to the bathroom, increased quickly back to 92%, deconditioning   Activity Tolerance   Activity Tolerance Patient limited by fatigue;Treatment limited secondary to medical complications (Comment)   Medical Staff Made Aware nsg   Nurse Made Aware RN cleared pt for PT   Assessment   Prognosis Good   Problem List Decreased strength;Decreased endurance; Impaired balance;Decreased mobility; Decreased safety awareness   Assessment Pt is 77 y o  female seen for a high complexity PT evaluation s/p admit to [facility] on 1/21/2022  Pt presenting w/ community acquired PNA of LLL of lung  Other active hospital problem list significant for: HTN, depressive disorder, prothrombin mutation, hx of PE, obesity, acute respiratory failure w/ hypoxia, hypomagnesemia, hypokalemia, CHAKA, recent hx COVID-19  PT now consulted to assess functional mobility and needs for safe d/c planning  Prior to admission, pt was Mary w/ functional mobility w/ SPC community distances, lives in a house w/ SIR w/ her family  Currently pt is Mary for bed skills; S for functional transfers; S for ambulation w/ SPC  Pt presents functioning below baseline and w/ overall mobility deficits 2* to: decreased LE strength; generalized weakness/deconditioning; decreased endurance; impaired balance; gait deviations; decreased safety awareness; SOB/SOLORZANO; fatigue; impaired safety and judgement; limited insight into current deficits; multiple lines  Pt will continue to benefit from skilled PT interventions to address stated impairments; to maximize functional potential; for ongoing pt/ family training; and DME needs  PT is currently recommending home w/ home PT  Also recommend pt receive a rollator due to endurance impairments  Barriers to Discharge Inaccessible home environment   Goals   Patient Goals go home   STG Expiration Date 02/12/22   Short Term Goal #1 In 14 days pt will complete: 1) Functional transfers with Mary  to facilitate safe return to previous living environment  2) Ambulation with AD (recommend rollator training) 300' w/ Mary without LOB for safe ambulation in home/community environment  3) Improve balance scores by 1 grade to decrease fall risk  4) Improve LE strength grades by 1 to increase independence w/ all functional mobility, transfers and gait   5) PT for ongoing pt and family education; DME needs and D/C planning to promote highest level of function in least restrictive environment  6) Stair training up/ down 3 steps with Mary for safe access to previous living environment and to increase community access  PT Treatment Day 0   Plan   Treatment/Interventions Functional transfer training;LE strengthening/ROM; Elevations; Therapeutic exercise; Endurance training;Patient/family training;Equipment eval/education;Gait training; Compensatory technique education;Spoke to nursing   PT Frequency 3-5x/wk   Recommendation   PT Discharge Recommendation Home with home health rehabilitation   [address]   Change/add to [employer]? No   AM-PAC Basic Mobility Inpatient   Turning in Bed Without Bedrails 4   Lying on Back to Sitting on Edge of Flat Bed 4   Moving Bed to Chair 3   Standing Up From Chair 3   Walk in Room 3   Climb 3-5 Stairs 3   Basic Mobility Inpatient Raw Score 20   Basic Mobility Standardized Score 43 99   Highest Level Of Mobility   -[facility] Goal 6: Walk 10 steps or more   Additional Treatment Session   Start Time 1120   End Time 1130   Treatment Assessment Gait training an additional 3' fwd/bwd w/ SPC x5 to increase activity tolerance; cues for pacing  Education provided re: PT POC, recommendation for home PT + rollator  End of Consult   Patient Position at End of Consult Bedside chair; All needs within reach  (all lines in tact)       Marcelino Stage, PT, DPT

## 2022-01-29 NOTE — PROGRESS NOTES
[facility]  Progress Note - Pat Salines 1955, 77 y o  female MRN: [id number]  Unit/Bed#: [facility] 928-01 Encounter: [id number]  Primary Care Provider: Sruthi Arechiga MD   Date and time admitted to hospital: 1/21/2022  1:43 PM    COVID-19 virus infection  Assessment & Plan  · Patient had positive exposure with multiple family members becoming positive  Patient was not tested in December  When she was tested she is negative but noted to have chest x-ray changes consistent with COVID  Currently on dexamethasone and baricitinib  · Follow the severe pathway since the patient required high-flow oxygen  · Pulmonology evaluation appreciated  · Finish the course of dexamethasone and baricitinib  · Continue dornase  · Monitor temperature    CHAKA (acute kidney injury) ([facility] [address] )  Assessment & Plan  · Monitor creatinine  · Creatinine today is 0 9    Acute respiratory failure with hypoxia (HCC)  Assessment & Plan  Likely multifactorial  Acute respiratory distress noted to be secondary to pneumonia versus COPD however also noted significantly elevated D-dimer at 3 4  Patient noted to have multiple pulmonary emboli on the CT-was on heparin drip and transition to Pradaxa  Patient was transferred to intensive care unit  Currently on high-flow oxygen  Pulmonology evaluation appreciated  Patient is currently on Pradaxa  Failed Eliquis since the patient had a PE on Eliquis  Pulmonology on board  Continue with the high-flow oxygen and wean off as tolerated    Obesity, morbid ([address] )  Assessment & Plan  Lifestyle modification    Hx of pulmonary embolus  Assessment & Plan  · Occurred post-operatively in 2015   · S/p IVC filter removal in 2016   Patient noted to have multifocal pulmonary emboli on the CT scan  Currently on Pradaxa    Failed Eliquis     Prothrombin mutation [facility])  Assessment & Plan  Continue Pradaxa    Depressive disorder  Assessment & Plan  Resume fluoxetine and trazodone    Benign essential hypertension  Assessment & Plan  Resume atenolol    * Community acquired pneumonia of left lower lobe of lung  Assessment & Plan  Negative COVID/FLU/RSV  CXR reveals Left sided pneumonia  Initially patient was on ceftriaxone but antibiotics discontinued  Abnormality seen in the chest x-ray is felt to be secondary to COVID since the patient had positive exposure      VTE Pharmacologic Prophylaxis:   Pharmacologic: Dabigatran (Pradaxa)  Mechanical VTE Prophylaxis in Place: Yes    Patient Centered Rounds: I have performed bedside rounds with nursing staff today  Discussions with Specialists or Other Care Team Provider:    Education and Discussions with Family / Patient:  daughter    Time Spent for Care: 30 minutes  More than 50% of total time spent on counseling and coordination of care as described above  Current Length of Stay: 8 day(s)    Current Patient Status: Inpatient   Certification Statement: The patient will continue to require additional inpatient hospital stay due to hypoxia    Discharge Plan:     Code Status: Level 1 - Full Code      Subjective:   Patient seen and examined  Objective:     Vitals:   Temp (24hrs), Av 9 °F (36 6 °C), Min:97 2 °F (36 2 °C), Max:98 8 °F (37 1 °C)    Temp:  [97 2 °F (36 2 °C)-98 8 °F (37 1 °C)] 97 5 °F (36 4 °C)  HR:  [75-92] 84  Resp:  [16-25] 18  BP: ()/(61-74) 94/61  SpO2:  [79 %-93 %] 92 %  Body mass index is 37 1 kg/m²  Input and Output Summary (last 24 hours): Intake/Output Summary (Last 24 hours) at 2022 1826  Last data filed at 2022 1300  Gross per 24 hour   Intake 360 ml   Output 0 ml   Net 360 ml       Physical Exam:     Physical Exam  Constitutional:       General: She is not in acute distress  HENT:      Head: Normocephalic and atraumatic  Nose: Nose normal    Eyes:      General: No scleral icterus  Cardiovascular:      Rate and Rhythm: Normal rate and regular rhythm  Pulses: Normal pulses     Pulmonary: Comments: decreased breath sounds bilateral   Abdominal:      General: Abdomen is flat  There is no distension  Palpations: There is no mass  Musculoskeletal:         General: Normal range of motion  Cervical back: Normal range of motion  No rigidity  Skin:     General: Skin is warm  Neurological:      General: No focal deficit present  Mental Status: She is alert  Additional Data:     Labs:    Results from last 7 days   Lab Units 01/29/22  1047   WBC Thousand/uL 12 13*   HEMOGLOBIN g/dL 13 3   HEMATOCRIT % 40 8   PLATELETS Thousands/uL 285   NEUTROS PCT % 88*   LYMPHS PCT % 4*   MONOS PCT % 7   EOS PCT % 0     Results from last 7 days   Lab Units 01/29/22  0610   POTASSIUM mmol/L 5 0   CHLORIDE mmol/L 98*   CO2 mmol/L 30   BUN mg/dL 37*   CREATININE mg/dL 0 94   CALCIUM mg/dL 9 8           * I Have Reviewed All Lab Data Listed Above  * Additional Pertinent Lab Tests Reviewed:  [facility] Admission Reviewed    Imaging:    Imaging Reports Reviewed Today Include:   Imaging Personally Reviewed by Myself Includes:      Recent Cultures (last 7 days):           Last 24 Hours Medication List:   Current Facility-Administered Medications   Medication Dose Route Frequency Provider Last Rate    acetaminophen  650 mg Oral Q6H PRN Uma Sims MD      albuterol  2 5 mg Nebulization Q4H PRN Uma Sims MD      aspirin  81 mg Oral Daily Uma Sims MD      atorvastatin  40 mg Oral QPM Uma Sims MD      Baricitinib  2 mg Oral Q24H Uma Sims MD      benzonatate  100 mg Oral TID PRN Uma Sims MD      dabigatran etexilate  150 mg Oral Q12H Maikel Haynes MD      dexamethasone  0 1 mg/kg Intravenous Q12H Uma Sims MD      dornase madiha  2 5 mg Inhalation BID Uam Sims MD      FLUoxetine  30 mg Oral Daily Uma Sims MD      fluticasone-vilanterol  1 puff Inhalation Daily Uma Sims MD  gabapentin  300 mg Oral TID Mendy Brown MD      melatonin  6 mg Oral HS Mendy Brown MD      ondansetron  4 mg Intravenous Q6H PRN Mendy Brown MD      pantoprazole  20 mg Oral Daily Mendy Brown MD      traZODone  25 mg Oral HS Mendy Brown MD          Today, Patient Was Seen By: Zoie Samaniego MD    ** Please Note: Dictation voice to text software may have been used in the creation of this document   **

## 2022-01-29 NOTE — CONSULTS
Consultation - Pulmonary Medicine   Luis Campos 77 y o  female MRN: [id number]  Unit/Bed#: [facility] 928-01 Encounter: [id number]      Assessment:  Acute hypoxic respiratory failure  COVID pneumonia  Bilateral PE with right heart strain  Pulmonary hypertension likely secondary to PE  History of prothrombin gene mutation  History of DVT PE  Acute diastolic heart failure    Plan:   Continue to wean oxygen trial on mid flow 1 respiratory able will likely need oxygen upon discharge  With regards to COVID pneumonia continue baricitinib day 8 of 14, high-dose Decadron day 8 of 10, after 10 days would taper by 50% every 3 days  Continue Pulmozyme until patient is off high-flow for 48 hours  With regards anticoagulation will defer to Hematology recommendations at this time  Discussed with patient dangers of leaving against medical advice she is agreeable to stay until she is weaned to a nasal cannula  History of Present Illness   Physician Requesting Consult: Saba Knapp MD  Reason for Consult / Principal Problem:  COVID    HPI: Luis Campos is a 77y o  year old female who presents with hospitalizations since January 21st for COVID pneumonia bilateral PE  She has was in the ICU and recently downgraded to med surge floor  She was diagnosed with bilateral PE despite being on Eliquis and aspirin home  Seen by Hematology advised to transition to Pradaxa on this admission  She has a history of bilateral PE DVT and stroke presumably due to PFO      Inpatient consult to Pulmonology  Consult performed by: Maria G Gilmore DO  Consult ordered by: Saba Knapp MD          Review of Systems    Historical Information   Past Medical History:   Diagnosis Date    Acid reflux     Anxiety     Arthritis     Back pain     Carpal tunnel syndrome, bilateral     Depression     Endometrial cancer ([facility] [address] ) 1997    Hiatal hernia     History of DVT of lower extremity     Right leg-had IVc filter  removal today-6/6/2016    History of pulmonary embolism     History of uterine cancer     Was stage 3- uterine, ovaries-hysterectomy    Hypercholesteremia     Hypertension     Left leg pain     Lumbar herniated disc     Tumor cells, benign     In right atrium  Probably congenital     Varicose vein of leg     Wears glasses      Past Surgical History:   Procedure Laterality Date    COLONOSCOPY      DENTAL SURGERY      DILATION AND CURETTAGE OF UTERUS      ESOPHAGOGASTRODUODENOSCOPY      FOOT SURGERY      LAST ASSESSED: [id number]    HYSTERECTOMY  1997    uterine cancer-hx    IR STROKE ALERT  10/20/2021    JOINT REPLACEMENT      b/l   THR    OOPHORECTOMY Bilateral 1997    DC COLONOSCOPY FLX DX W/COLLJ SPEC WHEN PFRMD N/A 6/14/2017    Procedure: EGD AND COLONOSCOPY;  Surgeon: Harrold Buerger, MD;  [facility];   Service: Gastroenterology    DC TOTAL HIP ARTHROPLASTY Left 4/13/2016    Procedure: ARTHROPLASTY HIP TOTAL;  Surgeon: Socrates Raphael DO;  [facility];  Service: Orthopedics    TONSILLECTOMY AND ADENOIDECTOMY      TOTAL HIP ARTHROPLASTY Right     LAST ASSESSED: [id number]     Social History   Social History     Substance and Sexual Activity   Alcohol Use Yes    Comment: 2 or 3 monthly     Social History     Substance and Sexual Activity   Drug Use No     E-Cigarette/Vaping    E-Cigarette Use Never User      E-Cigarette/Vaping Substances     Social History     Tobacco Use   Smoking Status Former Smoker    Packs/day: 0 50    Years: 50 00    Pack years: 25 00    Types: Cigarettes   Smokeless Tobacco Never Used   Tobacco Comment    none     Occupational History:  Not applicable    Family History: non-contributory    Meds/Allergies   all current active meds have been reviewed    Allergies   Allergen Reactions    Percocet [Oxycodone-Acetaminophen] Hives    Amlodipine     Percocet  [Oxycodone-Acetaminophen]        Objective   Vitals: Blood pressure 106/74, pulse 86, temperature (!) 97 2 °F (36 2 °C), resp  rate 18, height 5' 3" (1 6 m), weight 95 kg (209 lb 7 oz), SpO2 (!) 88 %  ,Body mass index is 37 1 kg/m²  No intake or output data in the 24 hours ending 01/29/22 1236  Invasive Devices  Report    Peripheral Intravenous Line            Peripheral IV 01/29/22 Dorsal (posterior); Left Forearm <1 day                Physical Exam    Lab Results:   ABG: No results found for: PHART, QMJ6URF, PO2ART, HKF9FWV, F5JYGIUO, BEART, SOURCE, BNP: No results found for: BNP, CBC:   Lab Results   Component Value Date    WBC 12 13 (H) 01/29/2022    HGB 13 3 01/29/2022    HCT 40 8 01/29/2022    MCV 98 01/29/2022     01/29/2022    MCH 32 0 01/29/2022    MCHC 32 6 01/29/2022    RDW 14 6 01/29/2022    MPV 11 3 01/29/2022    NRBC 0 01/29/2022   , CMP:   Lab Results   Component Value Date    SODIUM 134 (L) 01/29/2022    K 5 0 01/29/2022    CL 98 (L) 01/29/2022    CO2 30 01/29/2022    BUN 37 (H) 01/29/2022    CREATININE 0 94 01/29/2022    CALCIUM 9 8 01/29/2022    EGFR 63 01/29/2022   , PT/INR: No results found for: PT, INR, Troponin: No results found for: TROPONINI  Imaging Studies: I have personally reviewed pertinent films in PACS  EKG, Pathology, and Other Studies: I have personally reviewed pertinent films in PACS  VTE Prophylaxis: Reason for no pharmacologic prophylaxis On Pradaxa    Code Status: Level 1 - Full Code  Advance Directive and Living Will:      Power of :    POLST:      None

## 2022-01-29 NOTE — PLAN OF CARE
Problem: Potential for Falls  Goal: Patient will remain free of falls  Description: INTERVENTIONS:  - Educate patient/family on patient safety including physical limitations  - Instruct patient to call for assistance with activity   - Consult OT/PT to assist with strengthening/mobility   - Keep Call bell within reach  - Keep bed low and locked with side rails adjusted as appropriate  - Keep care items and personal belongings within reach  - Initiate and maintain comfort rounds  - Make Fall Risk Sign visible to staff  - Apply yellow socks and bracelet for high fall risk patients  - Consider moving patient to room near nurses station  Outcome: Progressing     Problem: PAIN - ADULT  Goal: Verbalizes/displays adequate comfort level or baseline comfort level  Description: Interventions:  - Encourage patient to monitor pain and request assistance  - Assess pain using appropriate pain scale  - Administer analgesics based on type and severity of pain and evaluate response  - Implement non-pharmacological measures as appropriate and evaluate response  - Consider cultural and social influences on pain and pain management  - Notify physician/advanced practitioner if interventions unsuccessful or patient reports new pain  Outcome: Progressing     Problem: SAFETY ADULT  Goal: Patient will remain free of falls  Description: INTERVENTIONS:  - Educate patient/family on patient safety including physical limitations  - Instruct patient to call for assistance with activity   - Consult OT/PT to assist with strengthening/mobility   - Keep Call bell within reach  - Keep bed low and locked with side rails adjusted as appropriate  - Keep care items and personal belongings within reach  - Initiate and maintain comfort rounds  - Make Fall Risk Sign visible to staff  - Apply yellow socks and bracelet for high fall risk patients  - Consider moving patient to room near nurses station  Outcome: Progressing     Problem: RESPIRATORY - ADULT  Goal: Achieves optimal ventilation and oxygenation  Description: INTERVENTIONS:  - Assess for changes in respiratory status  - Assess for changes in mentation and behavior  - Position to facilitate oxygenation and minimize respiratory effort  - Oxygen administered by appropriate delivery if ordered  - Initiate smoking cessation education as indicated  - Encourage broncho-pulmonary hygiene including cough, deep breathe, Incentive Spirometry  - Assess the need for suctioning and aspirate as needed  - Assess and instruct to report SOB or any respiratory difficulty  - Respiratory Therapy support as indicated  Outcome: Progressing

## 2022-01-29 NOTE — PLAN OF CARE
Problem: PHYSICAL THERAPY ADULT  Goal: Performs mobility at highest level of function for planned discharge setting  See evaluation for individualized goals  Description: Treatment/Interventions: Functional transfer training,LE strengthening/ROM,Elevations,Therapeutic exercise,Endurance training,Patient/family training,Equipment eval/education,Gait training,Compensatory technique education,Spoke to nursing  Equipment Recommended: Jameel Raphael       See flowsheet documentation for full assessment, interventions and recommendations  Note: Prognosis: Good  Problem List: Decreased strength,Decreased endurance,Impaired balance,Decreased mobility,Decreased safety awareness  Assessment: Pt is 77 y o  female seen for a high complexity PT evaluation s/p admit to [facility] on 1/21/2022  Pt presenting w/ community acquired PNA of LLL of lung  Other active hospital problem list significant for: HTN, depressive disorder, prothrombin mutation, hx of PE, obesity, acute respiratory failure w/ hypoxia, hypomagnesemia, hypokalemia, CHAKA, recent hx COVID-19  PT now consulted to assess functional mobility and needs for safe d/c planning  Prior to admission, pt was Mary w/ functional mobility w/ SPC community distances, lives in a house w/ SRI w/ her family  Currently pt is Mary for bed skills; S for functional transfers; S for ambulation w/ SPC  Pt presents functioning below baseline and w/ overall mobility deficits 2* to: decreased LE strength; generalized weakness/deconditioning; decreased endurance; impaired balance; gait deviations; decreased safety awareness; SOB/SOLORZANO; fatigue; impaired safety and judgement; limited insight into current deficits; multiple lines  Pt will continue to benefit from skilled PT interventions to address stated impairments; to maximize functional potential; for ongoing pt/ family training; and DME needs  PT is currently recommending home w/ home PT   Also recommend pt receive a rollator due to endurance impairments  Barriers to Discharge: Inaccessible home environment        PT Discharge Recommendation: Home with home health rehabilitation          See flowsheet documentation for full assessment

## 2022-01-29 NOTE — ASSESSMENT & PLAN NOTE
Negative COVID/FLU/RSV  CXR reveals Left sided pneumonia  Initially patient was on ceftriaxone but antibiotics discontinued    Abnormality seen in the chest x-ray is felt to be secondary to COVID since the patient had positive exposure

## 2022-01-29 NOTE — ASSESSMENT & PLAN NOTE
· Occurred post-operatively in 2015   · S/p IVC filter removal in 2016   Patient noted to have multifocal pulmonary emboli on the CT scan  Currently on Pradaxa    Failed Eliquis

## 2022-01-29 NOTE — ASSESSMENT & PLAN NOTE
· Patient had positive exposure with multiple family members becoming positive  Patient was not tested in December  When she was tested she is negative but noted to have chest x-ray changes consistent with COVID  Currently on dexamethasone and baricitinib  · Follow the severe pathway since the patient required high-flow oxygen  · Pulmonology evaluation appreciated  · Finish the course of dexamethasone and baricitinib    · Continue dornase  · Monitor temperature

## 2022-01-30 PROBLEM — E83.42 HYPOMAGNESEMIA: Status: RESOLVED | Noted: 2022-01-21 | Resolved: 2022-01-30

## 2022-01-30 PROBLEM — E87.6 HYPOKALEMIA: Status: RESOLVED | Noted: 2022-01-21 | Resolved: 2022-01-30

## 2022-01-30 LAB
ANION GAP SERPL CALCULATED.3IONS-SCNC: 3 MMOL/L (ref 4–13)
BUN SERPL-MCNC: 43 MG/DL (ref 5–25)
CALCIUM SERPL-MCNC: 9.1 MG/DL (ref 8.3–10.1)
CHLORIDE SERPL-SCNC: 99 MMOL/L (ref 100–108)
CO2 SERPL-SCNC: 30 MMOL/L (ref 21–32)
CREAT SERPL-MCNC: 0.98 MG/DL (ref 0.6–1.3)
ERYTHROCYTE [DISTWIDTH] IN BLOOD BY AUTOMATED COUNT: 14.6 % (ref 11.6–15.1)
GFR SERPL CREATININE-BSD FRML MDRD: 60 ML/MIN/1.73SQ M
GLUCOSE SERPL-MCNC: 158 MG/DL (ref 65–140)
HCT VFR BLD AUTO: 39.5 % (ref 34.8–46.1)
HGB BLD-MCNC: 12.8 G/DL (ref 11.5–15.4)
MCH RBC QN AUTO: 32.2 PG (ref 26.8–34.3)
MCHC RBC AUTO-ENTMCNC: 32.4 G/DL (ref 31.4–37.4)
MCV RBC AUTO: 100 FL (ref 82–98)
PLATELET # BLD AUTO: 281 THOUSANDS/UL (ref 149–390)
PMV BLD AUTO: 11.8 FL (ref 8.9–12.7)
POTASSIUM SERPL-SCNC: 5.2 MMOL/L (ref 3.5–5.3)
RBC # BLD AUTO: 3.97 MILLION/UL (ref 3.81–5.12)
SODIUM SERPL-SCNC: 132 MMOL/L (ref 136–145)
WBC # BLD AUTO: 10.55 THOUSAND/UL (ref 4.31–10.16)

## 2022-01-30 PROCEDURE — 85027 COMPLETE CBC AUTOMATED: CPT | Performed by: FAMILY MEDICINE

## 2022-01-30 PROCEDURE — 99232 SBSQ HOSP IP/OBS MODERATE 35: CPT | Performed by: FAMILY MEDICINE

## 2022-01-30 PROCEDURE — 94760 N-INVAS EAR/PLS OXIMETRY 1: CPT

## 2022-01-30 PROCEDURE — 99232 SBSQ HOSP IP/OBS MODERATE 35: CPT | Performed by: INTERNAL MEDICINE

## 2022-01-30 PROCEDURE — 97166 OT EVAL MOD COMPLEX 45 MIN: CPT

## 2022-01-30 PROCEDURE — 94640 AIRWAY INHALATION TREATMENT: CPT

## 2022-01-30 PROCEDURE — 80048 BASIC METABOLIC PNL TOTAL CA: CPT | Performed by: FAMILY MEDICINE

## 2022-01-30 RX ORDER — DEXAMETHASONE SODIUM PHOSPHATE 4 MG/ML
9 INJECTION, SOLUTION INTRA-ARTICULAR; INTRALESIONAL; INTRAMUSCULAR; INTRAVENOUS; SOFT TISSUE ONCE
Status: COMPLETED | OUTPATIENT
Start: 2022-01-30 | End: 2022-01-30

## 2022-01-30 RX ORDER — DEXAMETHASONE SODIUM PHOSPHATE 4 MG/ML
5 INJECTION, SOLUTION INTRA-ARTICULAR; INTRALESIONAL; INTRAMUSCULAR; INTRAVENOUS; SOFT TISSUE EVERY 12 HOURS SCHEDULED
Status: DISCONTINUED | OUTPATIENT
Start: 2022-01-31 | End: 2022-01-31 | Stop reason: HOSPADM

## 2022-01-30 RX ADMIN — GABAPENTIN 300 MG: 300 CAPSULE ORAL at 20:40

## 2022-01-30 RX ADMIN — PANTOPRAZOLE SODIUM 20 MG: 20 TABLET, DELAYED RELEASE ORAL at 09:36

## 2022-01-30 RX ADMIN — DORNASE ALFA 2.5 MG: 1 SOLUTION RESPIRATORY (INHALATION) at 07:50

## 2022-01-30 RX ADMIN — BENZONATATE 100 MG: 100 CAPSULE ORAL at 19:48

## 2022-01-30 RX ADMIN — DABIGATRAN ETEXILATE MESYLATE 150 MG: 150 CAPSULE ORAL at 20:41

## 2022-01-30 RX ADMIN — TRAZODONE HYDROCHLORIDE 25 MG: 50 TABLET ORAL at 21:38

## 2022-01-30 RX ADMIN — ATORVASTATIN CALCIUM 40 MG: 40 TABLET, FILM COATED ORAL at 17:50

## 2022-01-30 RX ADMIN — GABAPENTIN 300 MG: 300 CAPSULE ORAL at 09:36

## 2022-01-30 RX ADMIN — DORNASE ALFA 2.5 MG: 1 SOLUTION RESPIRATORY (INHALATION) at 20:16

## 2022-01-30 RX ADMIN — DABIGATRAN ETEXILATE MESYLATE 150 MG: 150 CAPSULE ORAL at 09:36

## 2022-01-30 RX ADMIN — FLUOXETINE HYDROCHLORIDE 30 MG: 10 CAPSULE ORAL at 20:40

## 2022-01-30 RX ADMIN — GABAPENTIN 300 MG: 300 CAPSULE ORAL at 15:29

## 2022-01-30 RX ADMIN — ASPIRIN 81 MG: 81 TABLET, COATED ORAL at 09:36

## 2022-01-30 RX ADMIN — DEXAMETHASONE SODIUM PHOSPHATE 9 MG: 4 INJECTION INTRA-ARTICULAR; INTRALESIONAL; INTRAMUSCULAR; INTRAVENOUS; SOFT TISSUE at 15:29

## 2022-01-30 RX ADMIN — BARICITINIB 2 MG: 2 TABLET, FILM COATED ORAL at 09:36

## 2022-01-30 RX ADMIN — FLUTICASONE FUROATE AND VILANTEROL TRIFENATATE 1 PUFF: 100; 25 POWDER RESPIRATORY (INHALATION) at 09:37

## 2022-01-30 RX ADMIN — Medication 6 MG: at 21:38

## 2022-01-30 NOTE — ASSESSMENT & PLAN NOTE
· Patient had positive exposure with multiple family members becoming positive  Patient was not tested in December  When she was tested she is negative but noted to have chest x-ray changes consistent with COVID  Currently on dexamethasone and baricitinib  · Follow the severe pathway since the patient required high-flow oxygen  · Pulmonology evaluation appreciated  · Finish the course of dexamethasone and baricitinib  -dexamethasone is weaning down  Will give another dose of dexamethasone later today and start 5 mg b i d   Tomorrow  · Continue dornase  · Monitor temperature

## 2022-01-30 NOTE — ASSESSMENT & PLAN NOTE
Likely multifactorial  Acute respiratory distress noted to be secondary to pneumonia versus COPD however also noted significantly elevated D-dimer at 3 4  Patient noted to have multiple pulmonary emboli on the CT-was on heparin drip and transition to Pradaxa  Patient was transferred to intensive care unit  Currently on high-flow oxygen  Pulmonology evaluation appreciated  Patient is currently on Pradaxa   Failed Eliquis since the patient had a PE on Eliquis  Pulmonology on board  Continue with the high-flow oxygen and wean off as tolerated-currently on 6 L nasal cannula

## 2022-01-30 NOTE — PROGRESS NOTES
[facility]  Progress Note - Neema Edmonsd 1955, 77 y o  female MRN: [id number]  Unit/Bed#: [facility] 928-01 Encounter: [id number]  Primary Care Provider: Reji Mcgraw MD   Date and time admitted to hospital: 1/21/2022  1:43 PM    COVID-19 virus infection  Assessment & Plan  · Patient had positive exposure with multiple family members becoming positive  Patient was not tested in December  When she was tested she is negative but noted to have chest x-ray changes consistent with COVID  Currently on dexamethasone and baricitinib  · Follow the severe pathway since the patient required high-flow oxygen  · Pulmonology evaluation appreciated  · Finish the course of dexamethasone and baricitinib  -dexamethasone is weaning down  Will give another dose of dexamethasone later today and start 5 mg b i d  Tomorrow  · Continue dornase  · Monitor temperature    CHAKA (acute kidney injury) ([facility] )  Assessment & Plan  · Monitor creatinine  · Creatinine today is 0 9  · Resolved    Acute respiratory failure with hypoxia (HCC)  Assessment & Plan  Likely multifactorial  Acute respiratory distress noted to be secondary to pneumonia versus COPD however also noted significantly elevated D-dimer at 3 4  Patient noted to have multiple pulmonary emboli on the CT-was on heparin drip and transition to Pradaxa  Patient was transferred to intensive care unit  Currently on high-flow oxygen  Pulmonology evaluation appreciated  Patient is currently on Pradaxa  Failed Eliquis since the patient had a PE on Eliquis  Pulmonology on board  Continue with the high-flow oxygen and wean off as tolerated-currently on 6 L nasal cannula    Obesity, morbid (HCC)  Assessment & Plan  Lifestyle modification    Hx of pulmonary embolus  Assessment & Plan  · Occurred post-operatively in 2015   · S/p IVC filter removal in 2016   Patient noted to have multifocal pulmonary emboli on the CT scan  Currently on Pradaxa    Failed Eliquis     Prothrombin mutation (HCC)  Assessment & Plan  Continue Pradaxa    Depressive disorder  Assessment & Plan  Resumed fluoxetine and trazodone    Benign essential hypertension  Assessment & Plan  Resumed atenolol    * Community acquired pneumonia of left lower lobe of lung  Assessment & Plan  Negative COVID/FLU/RSV  CXR reveals Left sided pneumonia  Initially patient was on ceftriaxone but antibiotics discontinued  Abnormality seen in the chest x-ray is felt to be secondary to COVID since the patient had positive exposure      VTE Pharmacologic Prophylaxis:   Pharmacologic: Dabigatran (Pradaxa)  Mechanical VTE Prophylaxis in Place: Yes    Patient Centered Rounds: I have performed bedside rounds with nursing staff today  Discussions with Specialists or Other Care Team Provider:     Education and Discussions with Family / Patient:  Daughter updated in the room    Time Spent for Care: 30 minutes  More than 50% of total time spent on counseling and coordination of care as described above  Current Length of Stay: 9 day(s)    Current Patient Status: Inpatient   Certification Statement: The patient will continue to require additional inpatient hospital stay due to Acute hypoxic respiratory failure    Discharge Plan:     Code Status: Level 1 - Full Code      Subjective:   Patient seen and examined  No events overnight  Patient was able to be weaned down to 6  L nasal can    Objective:     Vitals:   Temp (24hrs), Av 4 °F (36 3 °C), Min:97 3 °F (36 3 °C), Max:97 7 °F (36 5 °C)    Temp:  [97 3 °F (36 3 °C)-97 7 °F (36 5 °C)] 97 6 °F (36 4 °C)  HR:  [] 95  Resp:  [16-20] 17  BP: (100-125)/(67-77) 100/68  SpO2:  [91 %-94 %] 92 %  Body mass index is 33 9 kg/m²  Input and Output Summary (last 24 hours):        Intake/Output Summary (Last 24 hours) at 2022 1855  Last data filed at 2022 1700  Gross per 24 hour   Intake 480 ml   Output --   Net 480 ml       Physical Exam:     Physical Exam  Constitutional:       General: She is not in acute distress  HENT:      Head: Normocephalic  Nose: Nose normal    Eyes:      General: No scleral icterus  Cardiovascular:      Rate and Rhythm: Normal rate and regular rhythm  Pulses: Normal pulses  Pulmonary:      Effort: Pulmonary effort is normal       Breath sounds: Rales present  Comments: Decreased breath sounds bilateral  Bilateral coarse breath sounds  Abdominal:      General: Abdomen is flat  Musculoskeletal:         General: Normal range of motion  Cervical back: Normal range of motion and neck supple  Skin:     General: Skin is warm  Neurological:      General: No focal deficit present  Mental Status: She is alert  Additional Data:     Labs:    Results from last 7 days   Lab Units 01/30/22  0534 01/29/22  1047 01/29/22  1047   WBC Thousand/uL 10 55*   < > 12 13*   HEMOGLOBIN g/dL 12 8   < > 13 3   HEMATOCRIT % 39 5   < > 40 8   PLATELETS Thousands/uL 281   < > 285   NEUTROS PCT %  --   --  88*   LYMPHS PCT %  --   --  4*   MONOS PCT %  --   --  7   EOS PCT %  --   --  0    < > = values in this interval not displayed  Results from last 7 days   Lab Units 01/30/22  0534   POTASSIUM mmol/L 5 2   CHLORIDE mmol/L 99*   CO2 mmol/L 30   BUN mg/dL 43*   CREATININE mg/dL 0 98   CALCIUM mg/dL 9 1           * I Have Reviewed All Lab Data Listed Above  * Additional Pertinent Lab Tests Reviewed:  A[facility] Admission Reviewed    Imaging:    Imaging Reports Reviewed Today Include:   Imaging Personally Reviewed by Myself Includes:     Recent Cultures (last 7 days):           Last 24 Hours Medication List:   Current Facility-Administered Medications   Medication Dose Route Frequency Provider Last Rate    acetaminophen  650 mg Oral Q6H PRN Katarina Crenshaw MD      albuterol  2 5 mg Nebulization Q4H PRN Katarina Crenshaw MD      aspirin  81 mg Oral Daily Katarina Crenshaw MD      atorvastatin  40 mg Oral QPM Katarina Crenshaw MD      Baricitinib  2 mg Oral Q24H Katarina Crenshaw MD      benzonatate  100 mg Oral TID PRN Katarina Crenshaw MD      dabigatran etexilate  150 mg Oral Q12H Shira Cui MD      [START ON 1/31/2022] dexamethasone  5 mg Intravenous Q12H [facility] Chantal DO Eugene      dornase madiha  2 5 mg Inhalation BID Katarina Crenshaw MD      FLUoxetine  30 mg Oral Daily Katarina Crenshaw MD      fluticasone-vilanterol  1 puff Inhalation Daily Katarina Crenshaw MD      gabapentin  300 mg Oral TID Katarina Crenshaw MD      melatonin  6 mg Oral HS Katarina Crenshaw MD      ondansetron  4 mg Intravenous Q6H PRN Katarina Crenshaw MD      pantoprazole  20 mg Oral Daily Katarina Crenshaw MD      traZODone  25 mg Oral HS Katarina Crenshaw MD          Today, Patient Was Seen By: Brice Anne MD    ** Please Note: Dictation voice to text software may have been used in the creation of this document   **

## 2022-01-30 NOTE — OCCUPATIONAL THERAPY NOTE
Occupational Therapy Evaluation     Patient Name: Arlys Cogan OFPUM'V Date: 1/30/2022  Problem List  Principal Problem:    Community acquired pneumonia of left lower lobe of lung  Active Problems:    Benign essential hypertension    Depressive disorder    Prothrombin mutation ([facility] )    Hx of pulmonary embolus    Obesity, morbid ([facility] )    Acute respiratory failure with hypoxia (HCC)    Hypomagnesemia    Hypokalemia    CHAKA (acute kidney injury) ([facility] )    COVID-19 virus infection    Past Medical History  Past Medical History:   Diagnosis Date    Acid reflux     Anxiety     Arthritis     Back pain     Carpal tunnel syndrome, bilateral     Depression     Endomet[facility] cancer ([facility] ) 1997    Hiatal hernia     History of DVT of lower extremity     Right leg-had IVc filter  removal today-6/6/2016    History of pulmonary embolism     History of uterine cancer     Was stage 3- uterine, ovaries-hysterectomy    Hypercholesteremia     Hypertension     Left leg pain     Lumbar herniated disc     Tumor cells, benign     In right atrium  Probably congenital     Varicose vein of leg     Wears glasses      Past Surgical History  Past Surgical History:   Procedure Laterality Date    COLONOSCOPY      DENTAL SURGERY      DILATION AND CURETTAGE OF UTERUS      ESOPHAGOGASTRODUODENOSCOPY      FOOT SURGERY      LAST ASSESSED: [id number]    HYSTERECTOMY  1997    uterine cancer-hx    IR STROKE ALERT  10/20/2021    JOINT REPLACEMENT      b/l   THR    OOPHORECTOMY Bilateral 1997    VA COLONOSCOPY FLX DX W/COLLJ SPEC WHEN PFRMD N/A 6/14/2017    Procedure: EGD AND COLONOSCOPY;  Surgeon: Kristie Merrill MD;  Location: [facility];   Service: Gastroenterology    VA TOTAL HIP ARTHROPLASTY Left 4/13/2016    Procedure: ARTHROPLASTY HIP TOTAL;  Surgeon: Oziel Jean DO;  Location: [facility];  Service: Orthopedics    TONSILLECTOMY AND ADENOIDECTOMY      TOTAL HIP ARTHROPLASTY Right     LAST ASSESSED: [id number] 01/30/22 1425   OT Last Visit   OT Visit Date 01/30/22   Note Type   Note type Evaluation   Restrictions/Precautions   Weight Bearing Precautions Per Order No   Other Precautions Multiple lines;O2  (6L Midflow)   Pain Assessment   Pain Assessment Tool 0-10   Pain Score No Pain   Home Living   Type of Home House   Home Layout Two level;Bed/bath upstairs; Able to live on main level with bedroom/bathroom  (3 SRI)   Bathroom Shower/Tub Tub/shower unit  (plans to convert to walk in )   [address] [place] [address]  chair;Commode  (unused PTA)   Home Equipment Walker;Cane   Additional Comments Pt reports living in 2 SH, 3 SRI, reports staying on 1st floor w/ full bath and bedroom  Prior Function   Level of [place] Independent with ADLs and functional mobility; Needs assistance with IADLs   Lives With Son;Daughter; Other (Comment)  (grandkids)   Receives Help From Family   ADL Assistance Independent   IADLs Needs assistance   Falls in the last 6 months 0   Vocational Retired   Comments pt reports since Christmas her dght in law has been assisting w/ washing her hair and getting in/out of tub (pt reports she was unable to breathe and needed assist w/ ADLS during this time)  Prior to December, before pt was sick, she was I w/ ADLS  Pt does light IADLS but family does laundry in the basement (pt helps cook/clean & drives)  Has RW and cane but only uses SPC for transfers and functional mobility PTA  Lifestyle   Autonomy I w/ ADLS (assist for past month since being ill), assist w/ IADLS (assist w/ laundry, helps w/ cook/cleaning), (+)  and Mod I w/ SPC for transfers and functional mobility PTA   Reciprocal Relationships Pt lives w/ son in law, dght and grandkids (pts dght is home during the day)     Service to Others Retired    [facility] Enjoys TV and being w/ family   Psychosocial   Psychosocial ([address]) [address] [place]  Independent   Grooming Assistance 7  Independent   UB Bathing Assistance 5  Supervision/Setup   LB Bathing Assistance 5  Supervision/Setup   UB Dressing Assistance 5  Supervision/Setup   LB Dressing Assistance 5  Supervision/Setup   Toileting Assistance  5  Supervision/Setup   Functional Assistance 5  Supervision/Setup   Bed Mobility   Supine to Sit Unable to assess   Sit to Supine Unable to assess   Additional Comments pt seated up OOB in chair prior to and end of session   Transfers   Sit to Stand 5  Supervision   Additional items Verbal cues   Stand to Sit 5  Supervision   Additional items Verbal cues   Additional Comments use of SPC   Functional Mobility   Functional Mobility 5  Supervision   Additional Comments pt ambulated short household distance w/ S, use of SPC   Additional items SPC   Balance   Static Sitting Good   Dynamic Sitting Fair +   Static Standing Fair   Dynamic Standing Fair   Ambulatory Fair   Activity Tolerance   Activity Tolerance Patient tolerated treatment well   Nurse Made Aware yes   RUE Assessment   RUE Assessment WFL   LUE Assessment   LUE Assessment WFL   Hand Function   Gross Motor Coordination Functional   Fine Motor Coordination Functional   Cognition   Overall Cognitive Status WFL   Arousal/Participation Responsive; Cooperative   Attention Within functional limits   Orientation Level Oriented X4   Memory Within functional limits   Following Commands Follows all commands and directions without difficulty   Comments Pt is pleasant and cooperative   Assessment   Limitation Decreased endurance;Decreased high-level ADLs   Prognosis Fair   Assessment Pt is a 76 y/o female seen for OT eval s/p adm to [facility] w/ progressive worsening shortness of breath  Pt is dx'd w/ community acquired pneumonia of L lower lobe   Pt  has a past medical history of Acid reflux, Anxiety, Arthritis, Back pain, Carpal tunnel syndrome, bilateral, Depression, Endometrial cancer ([facility] [address] ) (1997), Hiatal hernia, History of DVT of lower extremity, History of pulmonary embolism, History of uterine cancer, Hypercholesteremia, Hypertension, Left leg pain, Lumbar herniated disc, Tumor cells, benign, Varicose vein of leg, and Wears glasses  Pt with active OT orders and up with assistance  orders  Pt lives with her son in law, dght and grandkids in 2 SH, 3 SRI, 1st floor setup w/ full bath and bedroom  Pt was I w/ ADLS and has assist for IADLS, drove, & required use of DME PTA including SPC  Pt is currently functioning at a S level for all ADL tasks w/ use of SPC for functional mobility  Pt reports no concerns about D/C home w/ family support once medically stable  Pt will have dght home to provide assistance/supervision as needed  Pt educated on energy conservation, safe activity engagement and frequent rest breaks 2/2 new increased O2 demands and decreased endurance  Pt verbalized understanding of education and reports no further concerns regarding D/C home from OT standpoint  Recommend home with family support upon D/C  Pt will no longer benefit from immediate acute skilled OT services  Pt w/ no further questions or concerns regarding OT services  Will D/C OT at this time  Please re-consult if pt's medical status changes  Goals   Patient Goals to go home   Recommendation   OT Discharge Recommendation No rehabilitation needs   OT - OK to Discharge Yes  (when medically stable)   Additional Comments  The patient's raw score on the AM-PAC Daily Activity inpatient short form is 24, standardized score is 57 54, greater than 39 4  Patients at this level are likely to benefit from discharge to home  Please refer to the recommendation of the Occupational Therapist for safe discharge planning     AM-PAC Daily Activity Inpatient   Lower Body Dressing 4   Bathing 4   Toileting 4   Upper Body Dressing 4   Grooming 4   Eating 4   Daily Activity Raw Score 24   Daily Activity Standardized Score (Calc for Raw Score >=11) 57 54   AM-PAC Applied Cognition Inpatient   Following a Speech/Presentation 4   Understanding Ordinary Conversation 4   Taking Medications 4   Remembering Where Things Are Placed or Put Away 4   Remembering List of 4-5 Errands 4   Taking Care of Complicated Tasks 4   Applied Cognition Raw Score 24   Applied Cognition Standardized Score 62 21        Lauri Ayala MS, OTR/L

## 2022-01-30 NOTE — PROGRESS NOTES
Progress Note - Pulmonary   Tulalip Adrián 77 y o  female MRN: [id number]  Unit/Bed#: [facility] 928-01 Encounter: [id number]    Assessment/Plan:  Acute hypoxic respiratory failure:  Patient is currently transition to mid flow at 10 L saturating 92% continue attempts to wean oxygen    COVID pneumonia continue high-dose Decadron day 9 of 10 will cut in half tomorrow and taper by half every 3 days, continue Pulmozyme for 24 more hours, continue baricitinib day 9 of 14    Chief Complaint:   I want to go home    Subjective:   Patient is feeling much better shortness of breath is improved cough is still productive of clear mucus  Objective:     Vitals: Blood pressure 125/76, pulse 71, temperature (!) 97 4 °F (36 3 °C), resp  rate 17, height 5' 3" (1 6 m), weight 86 8 kg (191 lb 5 8 oz), SpO2 92 %  ,Body mass index is 33 9 kg/m²  Intake/Output Summary (Last 24 hours) at 1/30/2022 1004  Last data filed at 1/29/2022 1853  Gross per 24 hour   Intake 300 ml   Output 0 ml   Net 300 ml       Invasive Devices  Report    Peripheral Intravenous Line            Peripheral IV 01/29/22 Dorsal (posterior); Left Forearm 1 day            Review of system:  Patient short of breath with exertion remainder of review of systems is    Physical Exam: /76   Pulse 71   Temp (!) 97 4 °F (36 3 °C)   Resp 17   Ht 5' 3" (1 6 m)   Wt 86 8 kg (191 lb 5 8 oz)   SpO2 92%   BMI 33 90 kg/m²   General appearance: alert and oriented, in no acute distress  Neck: no adenopathy, no carotid bruit, no JVD, supple, symmetrical, trachea midline and thyroid not enlarged, symmetric, no tenderness/mass/nodules  Lungs: crackles  Heart: regular rate and rhythm, S1, S2 normal, no murmur, click, rub or gallop  Abdomen: soft, non-tender; bowel sounds normal; no masses,  no organomegaly  Lymph nodes: Cervical, supraclavicular, and axillary nodes normal   Neurologic: Grossly normal     Labs:   CBC:   Lab Results   Component Value Date    WBC 10 55 (H) 01/30/2022    HGB 12 8 01/30/2022    HCT 39 5 01/30/2022     (H) 01/30/2022     01/30/2022    MCH 32 2 01/30/2022    MCHC 32 4 01/30/2022    RDW 14 6 01/30/2022    MPV 11 8 01/30/2022    NRBC 0 01/29/2022   , CMP:   Lab Results   Component Value Date    SODIUM 132 (L) 01/30/2022    K 5 2 01/30/2022    CL 99 (L) 01/30/2022    CO2 30 01/30/2022    BUN 43 (H) 01/30/2022    CREATININE 0 98 01/30/2022    CALCIUM 9 1 01/30/2022    EGFR 60 01/30/2022     Imaging and other studies: I have personally reviewed pertinent films in PACS

## 2022-01-31 VITALS
HEIGHT: 63 IN | RESPIRATION RATE: 18 BRPM | SYSTOLIC BLOOD PRESSURE: 126 MMHG | BODY MASS INDEX: 34.14 KG/M2 | DIASTOLIC BLOOD PRESSURE: 91 MMHG | HEART RATE: 86 BPM | OXYGEN SATURATION: 94 % | TEMPERATURE: 97.8 F | WEIGHT: 192.68 LBS

## 2022-01-31 PROBLEM — N17.9 AKI (ACUTE KIDNEY INJURY) (HCC): Status: RESOLVED | Noted: 2022-01-28 | Resolved: 2022-01-31

## 2022-01-31 LAB
ANION GAP SERPL CALCULATED.3IONS-SCNC: 4 MMOL/L (ref 4–13)
BUN SERPL-MCNC: 45 MG/DL (ref 5–25)
CALCIUM SERPL-MCNC: 9 MG/DL (ref 8.3–10.1)
CHLORIDE SERPL-SCNC: 100 MMOL/L (ref 100–108)
CO2 SERPL-SCNC: 30 MMOL/L (ref 21–32)
CREAT SERPL-MCNC: 1.06 MG/DL (ref 0.6–1.3)
DME PARACHUTE DELIVERY DATE REQUESTED: NORMAL
DME PARACHUTE ITEM DESCRIPTION: NORMAL
DME PARACHUTE ORDER STATUS: NORMAL
DME PARACHUTE SUPPLIER NAME: NORMAL
DME PARACHUTE SUPPLIER PHONE: NORMAL
ERYTHROCYTE [DISTWIDTH] IN BLOOD BY AUTOMATED COUNT: 14.6 % (ref 11.6–15.1)
GFR SERPL CREATININE-BSD FRML MDRD: 54 ML/MIN/1.73SQ M
GLUCOSE SERPL-MCNC: 141 MG/DL (ref 65–140)
HCT VFR BLD AUTO: 39.6 % (ref 34.8–46.1)
HGB BLD-MCNC: 13.2 G/DL (ref 11.5–15.4)
MAGNESIUM SERPL-MCNC: 1.9 MG/DL (ref 1.6–2.6)
MCH RBC QN AUTO: 32.3 PG (ref 26.8–34.3)
MCHC RBC AUTO-ENTMCNC: 33.3 G/DL (ref 31.4–37.4)
MCV RBC AUTO: 97 FL (ref 82–98)
PLATELET # BLD AUTO: 260 THOUSANDS/UL (ref 149–390)
PMV BLD AUTO: 11.3 FL (ref 8.9–12.7)
POTASSIUM SERPL-SCNC: 5 MMOL/L (ref 3.5–5.3)
RBC # BLD AUTO: 4.09 MILLION/UL (ref 3.81–5.12)
SODIUM SERPL-SCNC: 134 MMOL/L (ref 136–145)
WBC # BLD AUTO: 9.89 THOUSAND/UL (ref 4.31–10.16)

## 2022-01-31 PROCEDURE — 94760 N-INVAS EAR/PLS OXIMETRY 1: CPT

## 2022-01-31 PROCEDURE — 97530 THERAPEUTIC ACTIVITIES: CPT

## 2022-01-31 PROCEDURE — 99239 HOSP IP/OBS DSCHRG MGMT >30: CPT | Performed by: FAMILY MEDICINE

## 2022-01-31 PROCEDURE — 80048 BASIC METABOLIC PNL TOTAL CA: CPT | Performed by: FAMILY MEDICINE

## 2022-01-31 PROCEDURE — 83735 ASSAY OF MAGNESIUM: CPT | Performed by: FAMILY MEDICINE

## 2022-01-31 PROCEDURE — 99232 SBSQ HOSP IP/OBS MODERATE 35: CPT | Performed by: INTERNAL MEDICINE

## 2022-01-31 PROCEDURE — 85027 COMPLETE CBC AUTOMATED: CPT | Performed by: FAMILY MEDICINE

## 2022-01-31 PROCEDURE — 97116 GAIT TRAINING THERAPY: CPT

## 2022-01-31 RX ORDER — ACETAMINOPHEN 325 MG/1
650 TABLET ORAL EVERY 6 HOURS PRN
Refills: 0
Start: 2022-01-31

## 2022-01-31 RX ORDER — FLUTICASONE FUROATE AND VILANTEROL 100; 25 UG/1; UG/1
1 POWDER RESPIRATORY (INHALATION) DAILY
Qty: 60 BLISTER | Refills: 0 | Status: SHIPPED | OUTPATIENT
Start: 2022-02-01 | End: 2022-04-27

## 2022-01-31 RX ORDER — FUROSEMIDE 10 MG/ML
40 INJECTION INTRAMUSCULAR; INTRAVENOUS ONCE
Status: COMPLETED | OUTPATIENT
Start: 2022-01-31 | End: 2022-01-31

## 2022-01-31 RX ORDER — DABIGATRAN ETEXILATE 150 MG/1
150 CAPSULE, COATED PELLETS ORAL EVERY 12 HOURS SCHEDULED
Qty: 60 CAPSULE | Refills: 0 | Status: SHIPPED | OUTPATIENT
Start: 2022-01-31 | End: 2022-02-24 | Stop reason: SDUPTHER

## 2022-01-31 RX ORDER — BENZONATATE 100 MG/1
100 CAPSULE ORAL 3 TIMES DAILY PRN
Qty: 20 CAPSULE | Refills: 0 | Status: SHIPPED | OUTPATIENT
Start: 2022-01-31 | End: 2022-06-10 | Stop reason: ALTCHOICE

## 2022-01-31 RX ORDER — PREDNISONE 10 MG/1
TABLET ORAL
Qty: 42 TABLET | Refills: 0 | Status: SHIPPED | OUTPATIENT
Start: 2022-01-31 | End: 2022-02-24 | Stop reason: ALTCHOICE

## 2022-01-31 RX ADMIN — PANTOPRAZOLE SODIUM 20 MG: 20 TABLET, DELAYED RELEASE ORAL at 09:29

## 2022-01-31 RX ADMIN — FUROSEMIDE 40 MG: 10 INJECTION, SOLUTION INTRAVENOUS at 12:13

## 2022-01-31 RX ADMIN — ATORVASTATIN CALCIUM 40 MG: 40 TABLET, FILM COATED ORAL at 16:35

## 2022-01-31 RX ADMIN — GABAPENTIN 300 MG: 300 CAPSULE ORAL at 16:35

## 2022-01-31 RX ADMIN — DORNASE ALFA 2.5 MG: 1 SOLUTION RESPIRATORY (INHALATION) at 07:25

## 2022-01-31 RX ADMIN — FLUTICASONE FUROATE AND VILANTEROL TRIFENATATE 1 PUFF: 100; 25 POWDER RESPIRATORY (INHALATION) at 09:30

## 2022-01-31 RX ADMIN — DABIGATRAN ETEXILATE MESYLATE 150 MG: 150 CAPSULE ORAL at 09:29

## 2022-01-31 RX ADMIN — GABAPENTIN 300 MG: 300 CAPSULE ORAL at 09:30

## 2022-01-31 RX ADMIN — BARICITINIB 2 MG: 2 TABLET, FILM COATED ORAL at 09:29

## 2022-01-31 RX ADMIN — ASPIRIN 81 MG: 81 TABLET, COATED ORAL at 09:30

## 2022-01-31 RX ADMIN — DEXAMETHASONE SODIUM PHOSPHATE 5 MG: 4 INJECTION INTRA-ARTICULAR; INTRALESIONAL; INTRAMUSCULAR; INTRAVENOUS; SOFT TISSUE at 09:31

## 2022-01-31 NOTE — CASE MANAGEMENT
Case Management Discharge Planning Note    Patient name Baltazar Man  [facility] MRN [id number]  : 1955 Date 2022       Current Admission Date: 2022  Current Admission Diagnosis:Community acquired pneumonia of left lower lobe of lung   Patient Active Problem List    Diagnosis Date Noted    CHAKA (acute kidney injury) ([facility] ) 2022    COVID-19 virus infection 2022    Acute respiratory failure with hypoxia ([facility] ) 2022    Community acquired pneumonia of left lower lobe of lung 2022    Obesity, morbid ([facility] ) 2021    Hx of pulmonary embolus 10/21/2021    Stroke ([facility] ) 10/20/2021    Bronchitis 2019    Prothrombin mutation ([facility] ) 10/10/2018    Dyspepsia 2017    Irritable bowel syndrome 2017    Chronic insomnia 2017    Osteoarthritis of left hip 2016    Hypercoagulable state ([facility] ) 2015    Benign essential hypertension 2015    Dyslipidemia 2015    Gastroesophageal reflux disease without esophagitis 2015    Smoking 2015    Hip arthritis 2015    Spinal stenosis of lumbar region 2015    Vitamin D deficiency 2014    Depressive disorder 2012    Hyperlipidemia 2012      LOS (days): 10  Geometric Mean LOS (GMLOS) (days): 5 40  Days to GMLOS:-4 5     OBJECTIVE:  Risk of Unplanned Readmission Score: 19         Current admission status: Inpatient   Preferred Pharmacy:   [id number] [place] [id number] [address]  Phone: [phone] Fax: [phone] for [address] [place] [address] Brittany Balderrama [place] [address]  Phone: [phone] Fax: [phone]    Primary Care Provider: Steven Pritchett MD    Primary Insurance: [facility]  Secondary Insurance:     DISCHARGE DETAILS:      DME Referral Provided  Referral made for DME?: Yes  DME referral completed for the following items[de-identified] Walker,Portable Oxygen tanks,Home Oxygen concentrator  DME Supplier Name[de-identified] [employer]       Additional Comments: CM was notified that pt was requesting to d/c home today  CM ordered pt's home O2 and RW via [employer]  CM called GS at home requesting an update as to the status of the referral  CM is awaiting a return call   CM will follow

## 2022-01-31 NOTE — PROGRESS NOTES
01/31/22 1400   Clinical Encounter Type   Visited With Patient   Routine Visit Follow-up   Sacramental Encounters   Sacrament of Sick-Anointing Anointed   Sacrament Other Other (Comment)  ([place] by Fr Mary Phillips)

## 2022-01-31 NOTE — NURSING NOTE
Patient walked 120 steps on room air and oxygen level dropped below 88%  Patient currently at 4L resting right now  Lucinda Berkowitz w/ RT aware

## 2022-01-31 NOTE — PHYSICAL THERAPY NOTE
Physical Therapy Progress Note     01/31/22 1520   PT Last Visit   PT Visit Date 01/31/22   Note Type   Note Type Treatment   Pain Assessment   Pain Assessment Tool 0-10   Pain Score No Pain   Restrictions/Precautions   Other Precautions O2   Subjective   Subjective The pt  states that she is feeling a lot better, and that she is ready to go home  Bed Mobility   Supine to Sit 6  Modified independent   Transfers   Sit to Stand 6  Modified independent   Stand to Sit 6  Modified independent   Ambulation/Elevation   Gait pattern Excessively slow; Short stride;Decreased foot clearance   Gait Assistance 5  Supervision   Additional items Verbal cues   Assistive Device SPC   Distance 100 feet x 2, 60 feet x 2, 100 feet  Balance   Static Sitting Normal   Dynamic Sitting Good   Static Standing Fair +   Ambulatory Fair   Activity Tolerance   Activity Tolerance Patient tolerated treatment well;Patient limited by fatigue   Nurse Joyce Wilson RN  Assessment   Prognosis Good   Problem List Decreased endurance; Impaired balance;Decreased mobility   Assessment The pt  is ambulating beyond household distances with increased time and rests  She had no loss of balance, and she demonstrated appropriate awareness of her deficits  She was educated in the importance of energy conservation as well as continued mobility at home  She was expressive about being away from home for so long, and she had no concerns about returning home  She will benefit from continued therapy to facilitate her safe return to independence  Barriers to Discharge None   Goals   Patient Goals To go home today  STG Expiration Date 02/12/22   PT Treatment Day 1   Plan   Treatment/Interventions Functional transfer training;LE strengthening/ROM; Elevations; Therapeutic exercise; Endurance training;Patient/family training;Gait training;Bed mobility   Progress Progressing toward goals   PT Frequency 3-5x/wk   Recommendation   PT Discharge Recommendation Home with home health rehabilitation   [facility] in Bed Without Bedrails 4   Lying on Back to Sitting on Edge of Flat Bed 4   Moving Bed to Chair 3   Standing Up From Chair 3   Walk in Room 3   Climb 3-5 Stairs 3   Basic Mobility Inpatient Raw Score 20   Basic Mobility Standardized Score 43 99   Highest Level Of Mobility   JH-HLM Goal 6: Walk 10 steps or more   JH-HLM Highest Level of Mobility 7: Walk 25 feet or more   JH-HLM Goal Achieved Yes     An AM-PAC Basic Mobility standardized score less than 40 78 suggests the patient may benefit from discharge to post-acute rehab services      Dian Smiley, PTA

## 2022-01-31 NOTE — ASSESSMENT & PLAN NOTE
Remained hypotensive  Atenolol on hold    Follow-up with the primary care physician about restarting the atenolol

## 2022-01-31 NOTE — PROGRESS NOTES
Pastoral Care Progress Note    2022  Patient: Vania Toure : 1955  Admission Date & Time: 2022 1343  MRN: [id number] CSN: [id number]        Stopped in to visit with Amando Rivera as  felt 'waved in' when visiting nearby patient  eLann's daughter Frankie Massey and son-in-law Neel Ochoa present  Amando Rivera expressed a strong eula  Hospitality and spiritual support offered  [place] given to Amando Rivera and family  Hernanar Rivera expressed minor guilt for not practicing her [address] recently/acceptance offered  Follow-up as needed/requested                Chaplaincy Interventions Utilized:   Empowerment: Encouraged self-care    Exploration: Explored spiritual needs & resources    Relationship Building: Cultivated a relationship of care and support and Listened empathically    Ritual: Provided prayer    Chaplaincy Outcomes Achieved:  Expressed gratitude      Spiritual Coping Strategies Utilized:   Positive spiritual reframing       22 1200   Clinical Encounter Type   Visited With Patient and family together   Routine Visit Introduction   Taoism Encounters   Taoism Needs Prayer   Family Spiritual Encounters   Family Coping Accepting

## 2022-01-31 NOTE — PLAN OF CARE
Problem: PHYSICAL THERAPY ADULT  Goal: Performs mobility at highest level of function for planned discharge setting  See evaluation for individualized goals  Description: Treatment/Interventions: Functional transfer training,LE strengthening/ROM,Elevations,Therapeutic exercise,Endurance training,Patient/family training,Equipment eval/education,Gait training,Compensatory technique education,Spoke to nursing  Equipment Recommended: Angela Samson       See flowsheet documentation for full assessment, interventions and recommendations  Outcome: Progressing  Note: Prognosis: Good  Problem List: Decreased endurance,Impaired balance,Decreased mobility  Assessment: The pt  is ambulating beyond household distances with increased time and rests  She had no loss of balance, and she demonstrated appropriate awareness of her deficits  She was educated in the importance of energy conservation as well as continued mobility at home  She was expressive about being away from home for so long, and she had no concerns about returning home  She will benefit from continued therapy to facilitate her safe return to independence  Barriers to Discharge: None        PT Discharge Recommendation: Home with home health rehabilitation          See flowsheet documentation for full assessment

## 2022-01-31 NOTE — ASSESSMENT & PLAN NOTE
Patient was requiring high-flow and needed to be transferred to intensive care unit  Acute respiratory distress noted to be secondary to pneumonia versus COPD however also noted significantly elevated D-dimer at 3 4  Patient noted to have multiple pulmonary emboli on the CT-was on heparin drip and transitioned to Pradaxa  Patient was transferred out of the intensive care unit once the oxygen requirement are improving  Pulmonology evaluation appreciated  Patient is currently on Pradaxa  Failed Eliquis since the patient had a PE on Eliquis  Today patient is on 4-5 L of nasal cannula and she is insisting to go home    Patient will be discharged home with home oxygen with outpatient follow-up with the primary carePhysician and pulmonology   Patient need outpatient PFT and repeat CT in 2-3 months

## 2022-01-31 NOTE — PROGRESS NOTES
Progress Note - Pulmonary   Wanda Riley 77 y o  female MRN: [id number]  Unit/Bed#: [facility] 928-01 Encounter: [id number]      Assessment:  1  Acute hypoxic respiratory failure  2  COVID19 pneumonia  3  Acute Bilateral PE with RV strain  Hx of prothrombin gene mutation  4  DVT/PE  5  Acute diastolic dysfunction  6  Tobacco dependence in remission since 10/21    Plan:  1  Continue to wean oxygen as tolerated  2  I would check ambulatory pulse oximeter to ensure oxygenation is adequate  3  Today is day 10/10 decadron  Would reduce to 5mg Daily today  4    If patient is stable from oxygenation need with walk and insists on discharge, would start slow prednisone taper starting at 40mg, taper every 4-5 days  5    Continue Pradaxa for PE  6  Continue Breo Daily  7   She will need outpatient PFTs and repeat CT in 2-3 months  8  Consider an additional dose of Lasix  Subjective:   She states that she feels great and wants to go home today  Objective:   Vitals: Blood pressure 111/70, pulse (!) 109, temperature 97 6 °F (36 4 °C), resp  rate 22, height 5' 3" (1 6 m), weight 87 4 kg (192 lb 10 9 oz), SpO2 (!) 84 %  , 5-6 L NC, Body mass index is 34 13 kg/m²  Intake/Output Summary (Last 24 hours) at 1/31/2022 1039  Last data filed at 1/30/2022 1700  Gross per 24 hour   Intake 480 ml   Output --   Net 480 ml         Physical Exam  Gen: Awake, alert, oriented x 3, no acute distress  HEENT: Mucous membranes moist, no oral lesions, no thrush  NECK: No accessory muscle use, JVP not elevated  Cardiac: Regular, single S1, single S2, no murmurs, no rubs, no gallops  Lungs: Decreased breath sounds, crackles in lung bases bilaterally  Abdomen: normoactive bowel sounds, soft nontender, nondistended, no rebound or rigidity, no guarding  Extremities: no cyanosis, no clubbing, no edema    Labs: I have personally reviewed pertinent lab results    Results from last 7 days   Lab Units 01/31/22  3073 01/30/22  0534 01/29/22  1047 01/28/22  0539 01/28/22  0539 01/27/22  0447 01/27/22  0447 01/26/22  0433 01/26/22  0433   WBC Thousand/uL 9 89 10 55* 12 13*   < > 12 46*   < > 11 74*   < > 12 40*   HEMOGLOBIN g/dL 13 2 12 8 13 3   < > 13 9   < > 12 8   < > 13 0   HEMATOCRIT % 39 6 39 5 40 8   < > 41 7   < > 39 1   < > 39 3   PLATELETS Thousands/uL 260 281 285   < > 261   < > 221   < > 195   NEUTROS PCT %  --   --  88*  --   --   --  91*  --  89*   MONOS PCT %  --   --  7  --   --   --  4   < > 5   MONO PCT %  --   --   --   --  2*  --   --   --   --     < > = values in this interval not displayed        Results from last 7 days   Lab Units 01/31/22  0459 01/30/22  0534 01/29/22  0610   POTASSIUM mmol/L 5 0 5 2 5 0   CHLORIDE mmol/L 100 99* 98*   CO2 mmol/L 30 30 30   BUN mg/dL 45* 43* 37*   CREATININE mg/dL 1 06 0 98 0 94   CALCIUM mg/dL 9 0 9 1 9 8     Results from last 7 days   Lab Units 01/31/22  0459 01/29/22  0610 01/28/22  0539   MAGNESIUM mg/dL 1 9 1 9 2 0     Results from last 7 days   Lab Units 01/29/22  0610 01/28/22  0539 01/27/22  0447   PHOSPHORUS mg/dL 4 3* 4 4* 4 1      Results from last 7 days   Lab Units 01/26/22  0433 01/25/22 2005 01/25/22  1347   PTT seconds 135* 58* 89*         0   Lab Value Date/Time    TROPONINI <0 02 10/20/2021 1852         Meds/Allergies   Current Facility-Administered Medications   Medication Dose Route Frequency    acetaminophen (TYLENOL) tablet 650 mg  650 mg Oral Q6H PRN    albuterol inhalation solution 2 5 mg  2 5 mg Nebulization Q4H PRN    aspirin (ECOTRIN LOW STRENGTH) EC tablet 81 mg  81 mg Oral Daily    atorvastatin (LIPITOR) tablet 40 mg  40 mg Oral QPM    Baricitinib (OLUMIANT) (COVID EUA) tablet 2 mg  2 mg Oral Q24H    benzonatate (TESSALON PERLES) capsule 100 mg  100 mg Oral TID PRN    dabigatran etexilate (PRADAXA) capsule 150 mg  150 mg Oral Q12H [address]    dexamethasone (DECADRON) injection 5 mg  5 mg Intravenous Q12H [address]    dornase madiha (PULMOZYME) inhalation solution 2 5 mg  2 5 mg Inhalation BID    FLUoxetine (PROzac) capsule 30 mg  30 mg Oral Daily    fluticasone-vilanterol (BREO ELLIPTA) 100-25 mcg/inh inhaler 1 puff  1 puff Inhalation Daily    gabapentin (NEURONTIN) capsule 300 mg  300 mg Oral TID    melatonin tablet 6 mg  6 mg Oral HS    ondansetron (ZOFRAN) injection 4 mg  4 mg Intravenous Q6H PRN    pantoprazole (PROTONIX) EC tablet 20 mg  20 mg Oral Daily    traZODone (DESYREL) tablet 25 mg  25 mg Oral HS     Medications Prior to Admission   Medication    albuterol (2 5 mg/3 mL) 0 083 % nebulizer solution    apixaban (ELIQUIS) 5 mg    apixaban (ELIQUIS) 5 mg    aspirin (ECOTRIN LOW STRENGTH) 81 mg EC tablet    atenolol (TENORMIN) 25 mg tablet    atorvastatin (LIPITOR) 40 mg tablet    cyclobenzaprine (FLEXERIL) 5 mg tablet    FLUoxetine (PROzac) 10 mg capsule    gabapentin (NEURONTIN) 300 mg capsule    nicotine (NICODERM CQ) 14 mg/24hr TD 24 hr patch    pantoprazole (PROTONIX) 20 mg tablet         Microbiology:  Lab Results   Component Value Date    BLOODCX No Growth After 5 Days  01/21/2022    BLOODCX No Growth After 5 Days  01/21/2022    WOUNDCULT No growth 04/11/2017    SPUTUMCULTUR 2+ Growth of  01/21/2022     Imaging and other studies: I have personally reviewed pertinent reports  No new events overnight        DO Mally Aguilar [facility]

## 2022-01-31 NOTE — DISCHARGE SUMMARY
[facility]  1955, 77 y o  female MRN: [id number]  [facility] Encounter: [id number]  Primary Care Provider: Erica Raygoza MD   Date and time admitted to hospital: 1/21/2022  1:43 PM    COVID-19 virus infection  Assessment & Plan  · Patient had positive exposure with multiple family members becoming positive  Patient was not tested in December  When she was tested she is negative but noted to have chest x-ray changes consistent with COVID  Currently on dexamethasone and baricitinib  · Follow the severe pathway since the patient required high-flow oxygen  · Pulmonology evaluation appreciated  · Patient finished dornase during the hospitalization  · Patient will be discharged with steroid taper  Baricitinib discontinued at the time of discharge  · Anticoagulation with Pradaxa    Acute respiratory failure with hypoxia [facility])  Assessment & Plan  Patient was requiring high-flow and needed to be transferred to intensive care unit  Acute respiratory distress noted to be secondary to pneumonia versus COPD however also noted significantly elevated D-dimer at 3 4  Patient noted to have multiple pulmonary emboli on the CT-was on heparin drip and transitioned to Pradaxa  Patient was transferred out of the intensive care unit once the oxygen requirement are improving  Pulmonology evaluation appreciated  Patient is currently on Pradaxa  Failed Eliquis since the patient had a PE on Eliquis  Today patient is on 4-5 L of nasal cannula and she is insisting to go home    Patient will be discharged home with home oxygen with outpatient follow-up with the primary carePhysician and pulmonology   Patient need outpatient PFT and repeat CT in 2-3 months    Obesity, morbid [facility])  Assessment & Plan  Lifestyle modification    Hx of pulmonary embolus  Assessment & Plan  · Occurred post-operatively in 2015   · S/p IVC filter removal in 2016   Patient noted to have multifocal pulmonary emboli on the CT scan  Currently on Pradaxa  Prothrombin mutation (HCC)  Assessment & Plan  Continue Pradaxa    Depressive disorder  Assessment & Plan  Resumed fluoxetine and trazodone    Benign essential hypertension  Assessment & Plan  Remained hypotensive  Atenolol on hold  Follow-up with the primary care physician about restarting the atenolol    * Community acquired pneumonia of left lower lobe of lung  Assessment & Plan  Negative COVID/FLU/RSV  CXR reveals Left sided pneumonia  Initially patient was on ceftriaxone but antibiotics discontinued  Abnormality seen in the chest x-ray is felt to be secondary to [place] since the patient had positive exposure    CHAKA (acute kidney injury) (HCC)-resolved as of 1/31/2022  Assessment & Plan  · Monitor creatinine  · Creatinine today is 0 9  · Resolved        Discharging Physician / Practitioner: Kimberly Harper MD  PCP: Michael Councilman, MD  Admission Date:   Admission Orders (From admission, onward)     Ordered        01/21/22 1609  Inpatient Admission  Once                      Discharge Date: 01/31/22    Medical Problems             Resolved Problems  Date Reviewed: 1/31/2022          Resolved    Hypomagnesemia 1/30/2022     Resolved by  Kimberly Harper MD    Hypokalemia 1/30/2022     Resolved by  Kimberly Harper MD    CHAKA (acute kidney injury) [facility]) 1/31/2022     Resolved by  Kimberly Harper MD                Consultations During Hospital Stay:  · Pulmonology  · Critical Care    Procedures Performed:   ·     Significant Findings / Test Results:   Lower extremity venous Doppler-no evidence of lower extremity DVT  CTA PE study-multiple bilateral pulmonary emboli  Cardiomegaly  Ground-glass opacities  Chest x-ray portable-left-sided pneumonia  Echocardiogram-left ventricular size size is normal ejection fraction is 42% diastolic function is normal   Estimated pulmonary artery systolic pressure is 44    Right ventricular systolic function is mildly reduced  Incidental Findings:   ·     Test Results Pending at Discharge (will require follow up): Outpatient Tests Requested:  ·     Complications:   none    Reason for Admission:  Shortness of breath    Hospital Course: Luis Campos is a 77 y o  female patient who originally presented to the hospital on 1/21/2022 due to shortness of breath  Patient had multiple family members tested positive for COVID during the [place] time and patient was never tested  Patient came to the hospital with worsening shortness of breath  Patient was requiring high-flow oxygen  Patient had elevated D-dimer and CT scan was concerning for multiple pulmonary emboli  Patient was anticoagulated with Eliquis which was transition to Pradaxa during the hospitalization  Patient required intensive care admission due to acute hypoxic respiratory failure  Patient was maintained on high-flow and was eventually transition to the floor once her oxygen requirement was improving  Patient was initially started on antibiotics for possible pneumonia  Antibiotics was discontinued  Her CT scan appearance was consistent with COVID pneumonia  Patient was tested negative for COVID likely this is due to the fact that she cleared the COVID by the time of presentation  Patient received dexamethasone and baricitinib  Patient will be discharged home with the steroid taper with prednisone  Patient was on dornase which she finished with improvement in her respiratory status  On the day of discharge patient was down to 4-5 L of oxygen  Desaturation screen showed that the patient need outpatient home oxygen  Patient was insistent on going home and she was discharged on 1/31/2022  For details refer to the chart      Please see above list of diagnoses and related plan for additional information  Condition at Discharge: good     Discharge Day Visit / Exam:     Subjective:  Patient seen and examined  No events overnight    Patient wants to go home and she is insisting on going home  Vitals: Blood Pressure: 126/91 (01/31/22 1446)  Pulse: 86 (01/31/22 1446)  Temperature: 97 8 °F (36 6 °C) (01/31/22 1446)  Temp Source: Oral (01/28/22 2214)  Respirations: 18 (01/31/22 1446)  Height: 5' 3" (160 cm) (01/22/22 2000)  Weight - Scale: 87 4 kg (192 lb 10 9 oz) (01/31/22 0534)  SpO2: 94 % (01/31/22 1049)  Exam:   Physical Exam  Constitutional:       General: She is not in acute distress  HENT:      Head: Normocephalic and atraumatic  Nose: Nose normal    Eyes:      General: No scleral icterus  Cardiovascular:      Rate and Rhythm: Normal rate  Pulses: Normal pulses  Pulmonary:      Comments: Decreased breath sounds bilateral bilateral basal crackles  Abdominal:      General: Abdomen is flat  Musculoskeletal:         General: Normal range of motion  Cervical back: Normal range of motion and neck supple  Skin:     General: Skin is warm  Neurological:      General: No focal deficit present  Mental Status: She is alert  Discussion with Family:  Family updated over the phone    Discharge instructions/Information to patient and family:   See after visit summary for information provided to patient and family  Provisions for Follow-Up Care:  See after visit summary for information related to follow-up care and any pertinent home health orders  Disposition:     Home    ·      Planned Readmission: none     Discharge Statement:  I spent  45 minutes discharging the patient  This time was spent on the day of discharge  I had direct contact with the patient on the day of discharge  Greater than 50% of the total time was spent examining patient, answering all patient questions, arranging and discussing plan of care with patient as well as directly providing post-discharge instructions  Additional time then spent on discharge activities      Discharge Medications:  See after visit summary for reconciled discharge medications provided to patient and family        ** Please Note: This note has been constructed using a voice recognition system **

## 2022-01-31 NOTE — PLAN OF CARE
Problem: Potential for Falls  Goal: Patient will remain free of falls  Description: INTERVENTIONS:  - Educate patient/family on patient safety including physical limitations  - Instruct patient to call for assistance with activity   - Consult OT/PT to assist with strengthening/mobility   - Keep Call bell within reach  - Keep bed low and locked with side rails adjusted as appropriate  - Keep care items and personal belongings within reach  - Initiate and maintain comfort rounds  - Make Fall Risk Sign visible to staff  - Offer Toileting every 3 Hours, in advance of need  - Initiate/Maintain 3alarm  - Obtain necessary fall risk management equipment: 3  - Apply yellow socks and bracelet for high fall risk patients  - Consider moving patient to room near nurses station  Outcome: Progressing     Problem: PAIN - ADULT  Goal: Verbalizes/displays adequate comfort level or baseline comfort level  Description: Interventions:  - Encourage patient to monitor pain and request assistance  - Assess pain using appropriate pain scale  - Administer analgesics based on type and severity of pain and evaluate response  - Implement non-pharmacological measures as appropriate and evaluate response  - Consider cultural and social influences on pain and pain management  - Notify physician/advanced practitioner if interventions unsuccessful or patient reports new pain  Outcome: Progressing     Problem: SAFETY ADULT  Goal: Patient will remain free of falls  Description: INTERVENTIONS:  - Educate patient/family on patient safety including physical limitations  - Instruct patient to call for assistance with activity   - Consult OT/PT to assist with strengthening/mobility   - Keep Call bell within reach  - Keep bed low and locked with side rails adjusted as appropriate  - Keep care items and personal belongings within reach  - Initiate and maintain comfort rounds  - Make Fall Risk Sign visible to staff  - Offer Toileting every 3 Hours, in advance of need  - Initiate/Maintain 3alarm  - Obtain necessary fall risk management equipment: 3  - Apply yellow socks and bracelet for high fall risk patients  - Consider moving patient to room near nurses station  Outcome: Progressing     Problem: RESPIRATORY - ADULT  Goal: Achieves optimal ventilation and oxygenation  Description: INTERVENTIONS:  - Assess for changes in respiratory status  - Assess for changes in mentation and behavior  - Position to facilitate oxygenation and minimize respiratory effort  - Oxygen administered by appropriate delivery if ordered  - Initiate smoking cessation education as indicated  - Encourage broncho-pulmonary hygiene including cough, deep breathe, Incentive Spirometry  - Assess the need for suctioning and aspirate as needed  - Assess and instruct to report SOB or any respiratory difficulty  - Respiratory Therapy support as indicated  Outcome: Progressing     Problem: MOBILITY - ADULT  Goal: Maintain or return to baseline ADL function  Description: INTERVENTIONS:  -  Assess patient's ability to carry out ADLs; assess patient's baseline for ADL function and identify physical deficits which impact ability to perform ADLs (bathing, care of mouth/teeth, toileting, grooming, dressing, etc )  - Assess/evaluate cause of self-care deficits   - Assess range of motion  - Assess patient's mobility; develop plan if impaired  - Assess patient's need for assistive devices and provide as appropriate  - Encourage maximum independence but intervene and supervise when necessary  - Involve family in performance of ADLs  - Assess for home care needs following discharge   - Consider OT consult to assist with ADL evaluation and planning for discharge  - Provide patient education as appropriate  Outcome: Progressing  Goal: Maintains/Returns to pre admission functional level  Description: INTERVENTIONS:  - Perform BMAT or MOVE assessment daily    - Set and communicate daily mobility goal to care team and patient/family/caregiver  - Collaborate with rehabilitation services on mobility goals if consulted  - Perform Range of Motion 3 times a day  - Reposition patient every 3 hours    - Dangle patient 3 times a day  - Stand patient 3 times a day  - Ambulate patient 3 times a day  - Out of bed to chair 3 times a day   - Out of bed for meals 3 times a day  - Out of bed for toileting  - Record patient progress and toleration of activity level   Outcome: Progressing

## 2022-01-31 NOTE — ASSESSMENT & PLAN NOTE
· Occurred post-operatively in 2015   · S/p IVC filter removal in 2016   Patient noted to have multifocal pulmonary emboli on the CT scan  Currently on Pradaxa

## 2022-01-31 NOTE — CASE MANAGEMENT
Case Management Discharge Planning Note    Patient name Marco Antonio Licona  Location [facility] 928/[facility] 231-07 MRN [id number]  : 1955 Date 2022       Current Admission Date: 2022  Current Admission Diagnosis:Community acquired pneumonia of left lower lobe of lung   Patient Active Problem List    Diagnosis Date Noted    [facility] 2022    COVID-19 virus infection 2022    Acute respiratory failure with hypoxia ([facility] ) 2022    Community acquired pneumonia of left lower lobe of lung 2022    Obesity, morbid ([facility] ) 2021    Hx of pulmonary embolus 10/21/2021    Stroke ([facility] ) 10/20/2021    Bronchitis 2019    Prothrombin mutation ([facility] ) 10/10/2018    Dyspepsia 2017    Irritable bowel syndrome 2017    Chronic insomnia 2017    Osteoarthritis of left hip 2016    Hypercoagulable state ([facility] ) 2015    Benign essential hypertension 2015    Dyslipidemia 2015    Gastroesophageal reflux disease without esophagitis 2015    Smoking 2015    Hip arthritis 2015    Spinal stenosis of lumbar region 2015    Vitamin D deficiency 2014    Depressive disorder 2012    Hyperlipidemia 2012      LOS (days): 10  Geometric Mean LOS (GMLOS) (days): 5 40  Days to GMLOS:-4 6     OBJECTIVE:  Risk of Unplanned Readmission Score: 19         Current admission status: Inpatient   Preferred Pharmacy:   [facility]pharmacy [address] [place], [place] - [address]  Phone: [phone] Fax: [phone] for [address] [place] [address] Brittany [place] [address]  Phone: [phone] Fax: [phone]    Primary Care Provider: Don Berry MD    Primary Insurance: [employer] [facility]  Secondary Insurance:     DISCHARGE DETAILS:        Additional Comments: GERALDINE confirmed with [facility] that O2 was delivered  Per pt she does not need RW as she has one at home  CM spoke to pt's dtr who stated that she was waiting on the concentrator to be delivered to the home  Per pt's dtr she would provide transportation home

## 2022-01-31 NOTE — DISCHARGE INSTRUCTIONS
You need pulmonary function test done as an outpatient    Arrange through the pulmonology office  Need repeat CT in 2-3 months-arrange through the primary care physician/pulmonology

## 2022-01-31 NOTE — CASE MANAGEMENT
Case Management Discharge Planning Note    Patient name Mello Denson  Location [facility] 928/[facility] 518-30 MRN [id number]  : 1955 Date 2022       Current Admission Date: 2022  Current Admission Diagnosis:Community acquired pneumonia of left lower lobe of lung   Patient Active Problem List    Diagnosis Date Noted    CHAKA (acute kidney injury) ([facility] ) 2022    COVID-19 virus infection 2022    [facility] failure with hypoxia [facility] 2022    Community acquired pneumonia of left lower lobe of lung 2022    Obesity, morbid ([facility] ) 2021    Hx of pulmonary embolus 10/21/2021    Stroke ([place] [address] ) 10/20/2021    Bronchitis 2019    Prothrombin mutation ([place] [address] ) 10/10/2018    Dyspepsia 2017    Irritable bowel syndrome 2017    Chronic insomnia 2017    Osteoarthritis of left hip 2016    Hypercoagulable state [facility] ) 2015    Benign essential hypertension 2015    Dyslipidemia 2015    Gastroesophageal reflux disease without esophagitis 2015    Smoking 2015    Hip arthritis 2015    Spinal stenosis of lumbar region 2015    Vitamin D deficiency 2014    Depressive disorder 2012    Hyperlipidemia 2012      LOS (days): 10  Geometric Mean LOS (GMLOS) (days): 5 40  Days to GMLOS:-4 4     OBJECTIVE:  Risk of Unplanned Readmission Score: 19         Current admission status: Inpatient   Preferred Pharmacy:   [facility] [address] [place], [place] - [address]  Phone: [phone] Fax: [phone] for [address] [place] [address] [address] [place] [address]  Phone: [phone] Fax: [phone]    Primary Care Provider: Triny Samuels MD    Primary Insurance: [facility]  Secondary Insurance:     DISCHARGE DETAILS:    Discharge planning discussed with[de-identified] Pt  Freedom of Choice: Yes  Comments - Freedom of Choice: Prt requested referral be sent to [facility] [employer] if needed at d/c   CM contacted family/caregiver?: Yes (Family in room)  Were Treatment Team discharge recommendations reviewed with patient/caregiver?: Yes  Did patient/caregiver verbalize understanding of patient care needs?: Yes  Were patient/caregiver advised of the risks associated with not following Treatment Team discharge recommendations?: Yes    [address]         Is the patient interested in [facility] at discharge?: Yes  [address] requested[de-identified] Dre Jasvir Name[de-identified] Other  [facility] Provider[de-identified] PCP  Home Health Services Needed[de-identified] Evaluate Functional Status and Safety,Gait/ADL Training,Strengthening/Theraputic Exercises to Improve Function,Oxygen Via Nasal Cannula  Oxygen LPM Ordered (if applicable based on home health services needed):: 5 LPM  Homebound Criteria Met[de-identified] Requires the Assistance of Another Person for Safe Ambulation or to Leave the Home  Supporting Clincal Findings[de-identified] Limited Endurance    Other Referral/Resources/Interventions Provided:  Interventions: [facility]  Referral Comments: referral faxed to [facility] as requested      Treatment Team Recommendation[address]  Discharge Destination Plan[de-identified] Home with [place] at Discharge : Family

## 2022-01-31 NOTE — ASSESSMENT & PLAN NOTE
· Patient had positive exposure with multiple family members becoming positive  Patient was not tested in December  When she was tested she is negative but noted to have chest x-ray changes consistent with COVID  Currently on dexamethasone and baricitinib  · Follow the severe pathway since the patient required high-flow oxygen  · Pulmonology evaluation appreciated  · Patient finished dornase during the hospitalization  · Patient will be discharged with steroid taper    Baricitinib discontinued at the time of discharge  · Anticoagulation with Pradaxa

## 2022-01-31 NOTE — PLAN OF CARE
Problem: Potential for Falls  Goal: Patient will remain free of falls  Description: INTERVENTIONS:  - Educate patient/family on patient safety including physical limitations  - Instruct patient to call for assistance with activity   - Consult OT/PT to assist with strengthening/mobility   - Keep Call bell within reach  - Keep bed low and locked with side rails adjusted as appropriate  - Keep care items and personal belongings within reach  - Initiate and maintain comfort rounds  - Make Fall Risk Sign visible to staff  - Apply yellow socks and bracelet for high fall risk patients  - Consider moving patient to room near nurses station  Outcome: Progressing     Problem: PAIN - ADULT  Goal: Verbalizes/displays adequate comfort level or baseline comfort level  Description: Interventions:  - Encourage patient to monitor pain and request assistance  - Assess pain using appropriate pain scale  - Administer analgesics based on type and severity of pain and evaluate response  - Implement non-pharmacological measures as appropriate and evaluate response  - Consider cultural and social influences on pain and pain management  - Notify physician/advanced practitioner if interventions unsuccessful or patient reports new pain  Outcome: Progressing     Problem: SAFETY ADULT  Goal: Patient will remain free of falls  Description: INTERVENTIONS:  - Educate patient/family on patient safety including physical limitations  - Instruct patient to call for assistance with activity   - Consult OT/PT to assist with strengthening/mobility   - Keep Call bell within reach  - Keep bed low and locked with side rails adjusted as appropriate  - Keep care items and personal belongings within reach  - Initiate and maintain comfort rounds  - Make Fall Risk Sign visible to staff  - Apply yellow socks and bracelet for high fall risk patients  - Consider moving patient to room near nurses station  Outcome: Progressing     Problem: RESPIRATORY - ADULT  Goal: Achieves optimal ventilation and oxygenation  Description: INTERVENTIONS:  - Assess for changes in respiratory status  - Assess for changes in mentation and behavior  - Position to facilitate oxygenation and minimize respiratory effort  - Oxygen administered by appropriate delivery if ordered  - Initiate smoking cessation education as indicated  - Encourage broncho-pulmonary hygiene including cough, deep breathe, Incentive Spirometry  - Assess the need for suctioning and aspirate as needed  - Assess and instruct to report SOB or any respiratory difficulty  - Respiratory Therapy support as indicated  Outcome: Progressing     Problem: MOBILITY - ADULT  Goal: Maintain or return to baseline ADL function  Description: INTERVENTIONS:  -  Assess patient's ability to carry out ADLs; assess patient's baseline for ADL function and identify physical deficits which impact ability to perform ADLs (bathing, care of mouth/teeth, toileting, grooming, dressing, etc )  - Assess/evaluate cause of self-care deficits   - Assess range of motion  - Assess patient's mobility; develop plan if impaired  - Assess patient's need for assistive devices and provide as appropriate  - Encourage maximum independence but intervene and supervise when necessary  - Involve family in performance of ADLs  - Assess for home care needs following discharge   - Consider OT consult to assist with ADL evaluation and planning for discharge  - Provide patient education as appropriate  Outcome: Progressing  Goal: Maintains/Returns to pre admission functional level  Description: INTERVENTIONS:  - Perform BMAT or MOVE assessment daily    - Set and communicate daily mobility goal to care team and patient/family/caregiver     - Collaborate with rehabilitation services on mobility goals if consulted  - Out of bed for toileting  - Record patient progress and toleration of activity level   Outcome: Progressing

## 2022-02-01 ENCOUNTER — TRANSITIONAL CARE MANAGEMENT (OUTPATIENT)
Dept: FAMILY MEDICINE CLINIC | Facility: CLINIC | Age: 67
End: 2022-02-01

## 2022-02-01 ENCOUNTER — TELEPHONE (OUTPATIENT)
Dept: NEUROLOGY | Facility: CLINIC | Age: 67
End: 2022-02-01

## 2022-02-01 NOTE — TELEPHONE ENCOUNTER
Candice French left a VM stating she wanted to reschedule her 3/10/22 appointment with Ida Palma  I called her back and lmom to give us a call to reschedule her appointment

## 2022-02-01 NOTE — UTILIZATION REVIEW
Notification of Discharge   This is a Notification of Discharge from our facility [address]  Please be advised that this patient has been discharge from our facility  Below you will find the admission and discharge date and time including the patients disposition  UTILIZATION REVIEW CONTACT:  Davion Higginbotham  Utilization   Network Utilization Review Department  Phone: [phone] x carefully listen to the prompts  All voicemails are confidential   Email: [email or web address]     PHYSICIAN ADVISORY SERVICES:  FOR CIVF-SA-CDXJ REVIEW - MEDICAL NECESSITY DENIAL  Phone: [phone]  Fax: [phone]  Email: [email or web address]     PRESENTATION DATE: 1/21/2022  1:43 PM  OBERVATION ADMISSION DATE:   INPATIENT ADMISSION DATE: 1/21/22  4:09 PM   DISCHARGE DATE: 1/31/2022  8:20 PM  DISPOSITION: Home with [place] with [address] [place] [address] Road INFORMATION:  Send all requests for admission clinical reviews, approved or denied determinations and any other requests to dedicated fax number below belonging to the campus where the patient is receiving treatment   List of dedicated fax numbers:  [address] DENIALS (Administrative/Medical Necessity) [phone]   [address] [place] [address] (Maternity/NICU/Pediatrics) [phone]   Stephanie Mckay [phone]   [address] [place] [address] [phone]   [address] [place] [address] [phone]   [address] [place] [address] [address] [place] [address],[address] [phone]   [facility]  [phone]   [address] [place] [address], [facility]  [address] [facility] [address] [facility] [phone]

## 2022-02-02 DIAGNOSIS — I63.412 CEREBROVASCULAR ACCIDENT (CVA) DUE TO EMBOLISM OF LEFT MIDDLE CEREBRAL ARTERY (HCC): ICD-10-CM

## 2022-02-02 RX ORDER — ATORVASTATIN CALCIUM 40 MG/1
40 TABLET, FILM COATED ORAL EVERY EVENING
Qty: 60 TABLET | Refills: 0 | Status: SHIPPED | OUTPATIENT
Start: 2022-02-02 | End: 2022-03-28

## 2022-02-02 NOTE — TELEPHONE ENCOUNTER
Geno Dhaliwal called to let Dr Stephanie Goff know that the patient being D/C from the hospital and she is going to need a refill on medication  Also she has blisters in her mouth and Geno Dhaliwal wanted to know if Nystatin wash for the mouth could ordered as well  Please advise  Patient is scheduled for a virtual appt on 2/2/2022 with Dr Stephanie Goff

## 2022-02-03 ENCOUNTER — TELEPHONE (OUTPATIENT)
Dept: GASTROENTEROLOGY | Facility: CLINIC | Age: 67
End: 2022-02-03

## 2022-02-03 NOTE — TELEPHONE ENCOUNTER
Patients GI provider:  Dr Altaf Mitchell    Number to return call: [phone]    Reason for call: Pt just got discharged from hospital and would like to push back her procedure       Scheduled procedure/appointment date if applicable: procedure 3/06/98

## 2022-02-03 NOTE — TELEPHONE ENCOUNTER
Scheduled date of colonoscopy (as of today): 6/10/22  Physician performing colonoscopy: Dr Astorga  Location of colonoscopy: [facility]  Bowel prep reviewed with patient: Miralax/Dulcolax  Instructions reviewed with patient by: Leeanne/mailed  Clearances: Med Clearance message sent to Ismael Guzman

## 2022-02-03 NOTE — TELEPHONE ENCOUNTER
Our mutual patient is scheduled for procedure: Colonoscopy    On: 6/10/22    With: Dr Reynaldo León  She is taking the following blood thinner: Pradaxa    Can this be stopped __2____ days prior to the procedure?       Physician Approving clearance: ________________________

## 2022-02-04 ENCOUNTER — OFFICE VISIT (OUTPATIENT)
Dept: FAMILY MEDICINE CLINIC | Facility: CLINIC | Age: 67
End: 2022-02-04
Payer: COMMERCIAL

## 2022-02-04 VITALS
BODY MASS INDEX: 35.12 KG/M2 | RESPIRATION RATE: 16 BRPM | WEIGHT: 198.2 LBS | HEIGHT: 63 IN | SYSTOLIC BLOOD PRESSURE: 136 MMHG | OXYGEN SATURATION: 98 % | DIASTOLIC BLOOD PRESSURE: 96 MMHG | HEART RATE: 71 BPM | TEMPERATURE: 98.4 F

## 2022-02-04 DIAGNOSIS — Z72.0 TOBACCO USE: ICD-10-CM

## 2022-02-04 DIAGNOSIS — Z79.01 CURRENT USE OF LONG TERM ANTICOAGULATION: ICD-10-CM

## 2022-02-04 DIAGNOSIS — U07.1 COVID-19 VIRUS INFECTION: ICD-10-CM

## 2022-02-04 DIAGNOSIS — B37.0 THRUSH: Primary | ICD-10-CM

## 2022-02-04 DIAGNOSIS — E66.01 OBESITY, MORBID (HCC): ICD-10-CM

## 2022-02-04 DIAGNOSIS — I26.99 BILATERAL PULMONARY EMBOLISM (HCC): ICD-10-CM

## 2022-02-04 DIAGNOSIS — Z86.73 RECENT CEREBROVASCULAR ACCIDENT (CVA): ICD-10-CM

## 2022-02-04 DIAGNOSIS — D68.52 PROTHROMBIN MUTATION (HCC): ICD-10-CM

## 2022-02-04 DIAGNOSIS — Q21.1 PFO (PATENT FORAMEN OVALE): ICD-10-CM

## 2022-02-04 PROBLEM — J96.01 ACUTE RESPIRATORY FAILURE WITH HYPOXIA (HCC): Status: RESOLVED | Noted: 2022-01-21 | Resolved: 2022-02-04

## 2022-02-04 PROCEDURE — 99495 TRANSJ CARE MGMT MOD F2F 14D: CPT | Performed by: INTERNAL MEDICINE

## 2022-02-04 NOTE — PROGRESS NOTES
Assessment/Plan:           Problem List Items Addressed This Visit        Cardiovascular and Mediastinum    Stroke [facility])       Other    Prothrombin mutation ([facility] )    Obesity, morbid ([facility] )    COVID-19 virus infection      Other Visit Diagnoses     Thrush    -  Primary    Relevant Medications    nystatin (MYCOSTATIN) 500,000 units/5 mL suspension    Bilateral pulmonary embolism (HCC)        Current use of long term anticoagulation        PFO (patent foramen ovale)        Tobacco use            see discussion above  Continue with Pradaxa  Patient has not been smoking for the last several weeks now  Lab studies will be ordered  Patient follow-up with Cardiology for discussion of PFO closure  Patient continue with oxygen and would follow up with Pulmonary  Patient also make time for mammogram   Colonoscopy postponed till May  Subjective:      Patient ID: Marbin Guillory is a 77 y o  female  HPI  TCM Call (since 1/4/2022)     Date and time call was made  2/1/2022 10:52 AM    Hospital care reviewed  Records reviewed        Patient was hospitial[facility] at  [facility]        Date of Admission  01/21/22    Date of discharge  01/31/22    Diagnosis  Community acquired pneumonia of left lower lobe of lung    Disposition  Home    Were the patients medications reviewed and updated  Yes    Current Symptoms  None      TCM Call (since 1/4/2022)     Post hospital issues  None    Should patient be enrolled in anticoag monitoring? No    Scheduled for follow up?   Yes    Patients specialists  Pulmonlolgist; Neurologist    Did you obtain your prescribed medications  Yes    Do you need help managing your prescriptions or medications  No    Is transportation to your appointment needed  No    I have advised the patient to call PCP with any new or worsening symptoms  Tomás Beaulieu 0272; Family members    [facility]; Family    The type of support provided  Physical; Emotional; Financial Do you have social support  Yes, as much as I need    Are you recieving any outpatient services  No    Are you recieving home care services  Yes    Types of home care services  Home PT; Home health aid    Are you using any community resources  No    Current waiver services  No    Have you fallen in the last 12 months  No    Interperter language line needed  No    Counseling  Patient      Patient is here to follow-up on recent hospitalization with pulmoanry embolism with right heart strain the heel of having a CVA from embolic episode possibly due to PFO with right-to-left shunt  Patient was admitted with acute respiratory failure the beginning of the month  Patient had been diagnosed with COVID recently  Patient was found to have bilateral multiple pulmoanry embolism with right heart strain at the time of hospitalization  Patient was already on Eliquis and was switched over to Pradaxa because of treatment failure after heparin drip  Patient required high-flow of oxygen with was able to transition over to 4-5 L of oxygen at home  Patient was seen by Hematology in the hospital   Patient had been seen by Hematology in the past and has been known to have prothrombin gene mutation  Patient had several DVTs and 1 pulmoanry embolism in the past   Patient was noted to have birth control and was actively smoking at that point another DVT which she had right hip replacement another when she had left hip replacement requiring IVC filter and thereafter has been on aspirin  Fortunately since her recent hospitalizations patient has quit smoking  Patient will be following up with Cardiology for PFO closure  It is to be noted patient has history of ulcerative colitis  So far patient has been doing well without any nausea abdominal pain melena hematochezia  Patient has postponed her colonoscopy till May    She is due for mammogram   The following portions of the patient's history were reviewed and updated as appropriate: allergies, current medications, past family history, past medical history, past social history, past surgical history and problem list     Review of Systems      Objective:      /96 (BP Location: Left arm, Patient Position: Sitting, Cuff Size: Standard)   Pulse 71   Temp 98 4 °F (36 9 °C) (Tympanic)   Resp 16   Ht 5' 3" (1 6 m)   Wt 89 9 kg (198 lb 3 2 oz)   SpO2 98%   BMI 35 11 kg/m²          Physical Exam  Cardiovascular:      Rate and Rhythm: Normal rate and regular rhythm  Heart sounds: Normal heart sounds  No murmur heard  Pulmonary:      Effort: Pulmonary effort is normal  No respiratory distress  Breath sounds: No wheezing or rales  Comments: Dyspnea with minimal exertion  On portable oxygen  Abdominal:      General: Bowel sounds are normal  There is no distension  Tenderness: There is no abdominal tenderness  There is no guarding  Musculoskeletal:      Right lower leg: No edema  Left lower leg: No edema  Neurological:      Mental Status: She is alert     Psychiatric:         Mood and Affect: Mood normal          Behavior: Behavior normal

## 2022-02-10 ENCOUNTER — TELEPHONE (OUTPATIENT)
Dept: FAMILY MEDICINE CLINIC | Facility: CLINIC | Age: 67
End: 2022-02-10

## 2022-02-10 ENCOUNTER — TELEPHONE (OUTPATIENT)
Dept: GYNECOLOGIC ONCOLOGY | Facility: CLINIC | Age: 67
End: 2022-02-10

## 2022-02-10 NOTE — TELEPHONE ENCOUNTER
Patient canceled Consult with Socorro Campo PA-C on 12/28/21 and 1/13/22   LVM for patient to call the office back to R/S
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
## 2022-02-10 NOTE — TELEPHONE ENCOUNTER
[facility] called regarding home health physical therapy only attended patients home once this week due to patient's schedule  Will resume 2x weekly next week

## 2022-02-10 NOTE — TELEPHONE ENCOUNTER
Reviewed with Phan Henderson and a per Phan Henderson she would like Antony Corona to be R/S'd with a different provider

## 2022-02-12 ENCOUNTER — IMMUNIZATIONS (OUTPATIENT)
Dept: FAMILY MEDICINE CLINIC | Facility: HOSPITAL | Age: 67
End: 2022-02-12

## 2022-02-12 PROCEDURE — 91305 COVID-19 PFIZER VACC TRIS-SUCROSE GRAY CAP 0.3 ML: CPT

## 2022-02-12 PROCEDURE — 0051A COVID-19 PFIZER VACC TRIS-SUCROSE GRAY CAP 0.3 ML: CPT

## 2022-02-17 ENCOUNTER — TELEPHONE (OUTPATIENT)
Dept: HEMATOLOGY ONCOLOGY | Facility: CLINIC | Age: 67
End: 2022-02-17

## 2022-02-17 ENCOUNTER — TELEPHONE (OUTPATIENT)
Dept: FAMILY MEDICINE CLINIC | Facility: CLINIC | Age: 67
End: 2022-02-17

## 2022-02-17 DIAGNOSIS — I63.412 CEREBROVASCULAR ACCIDENT (CVA) DUE TO EMBOLISM OF LEFT MIDDLE CEREBRAL ARTERY (HCC): ICD-10-CM

## 2022-02-17 RX ORDER — FLUOXETINE 10 MG/1
30 CAPSULE ORAL DAILY
Qty: 120 CAPSULE | Refills: 0 | Status: SHIPPED | OUTPATIENT
Start: 2022-02-17 | End: 2022-03-28

## 2022-02-17 NOTE — TELEPHONE ENCOUNTER
New Patient Intake Form   Patient Details:    Campbell Davis  1955  [id number]    Appointment Information   Who is calling to schedule? Patient   If not self, what is the caller's name? Please put name of [facility] nurse as well  Referring provider Diana Hamilton MD   What is the diagnosis? Anticoagulation management encounter     Is there a confirmed tissue diagnosis? No   Is there a biopsy ordered or pending? Please specify dates   n/a     Is patient aware of diagnosis? Yes   Have you had any imaging or labs done? If yes, where? (If imaging done outside of [facility], please remind patient to bring a disk ) Yes     01/29     If imaging done at outside facility, did you instruct patient to obtain discs and bring to visit? n/a   Have you been seen by another Oncologist/Hematologist?  If so, who and where? no   Are the records in [facility] or [facility]where? yes   Does the patient have records at another facility/hospital? no   If yes, Name of facility, city and state where facility is located  Did you instruct patient to have records faxed to [facility] and provide [facility] number? no   Preferred [facility]   Is the patient willing to be seen by another provider?   (This is for breast patients only) no   Miscellaneous Information: appt made for 03/03

## 2022-02-17 NOTE — TELEPHONE ENCOUNTER
Called Pito Santos from [facility] wait list and rescheduled her with Rafael Gibbons on 2/24/22 @ 1:15 pm

## 2022-02-17 NOTE — TELEPHONE ENCOUNTER
Nancy Cohen- from [address] [place] [address] PT, called into office requesting if patient can taper down from her Oxygen from 3 liters to 2 liters  Order can be faxed to [phone]      Please review  Thank you

## 2022-02-17 NOTE — TELEPHONE ENCOUNTER
From: Sandi Bowles  To: Office of Natanael Kelley MD  Sent: 2/17/2022 8:10 AM EST  Subject: Medication Renewal Request    Refills have been requested for the following medications:    Other - Fluoxetine 20 mg      Preferred pharmacy: [id number] [address]

## 2022-02-24 ENCOUNTER — OFFICE VISIT (OUTPATIENT)
Dept: PULMONOLOGY | Facility: CLINIC | Age: 67
End: 2022-02-24
Payer: COMMERCIAL

## 2022-02-24 ENCOUNTER — OFFICE VISIT (OUTPATIENT)
Dept: NEUROLOGY | Facility: CLINIC | Age: 67
End: 2022-02-24
Payer: COMMERCIAL

## 2022-02-24 ENCOUNTER — TELEPHONE (OUTPATIENT)
Dept: PULMONOLOGY | Facility: CLINIC | Age: 67
End: 2022-02-24

## 2022-02-24 VITALS
WEIGHT: 196 LBS | TEMPERATURE: 95.7 F | SYSTOLIC BLOOD PRESSURE: 112 MMHG | DIASTOLIC BLOOD PRESSURE: 75 MMHG | BODY MASS INDEX: 34.73 KG/M2 | HEART RATE: 140 BPM | HEIGHT: 63 IN

## 2022-02-24 VITALS
TEMPERATURE: 97.2 F | SYSTOLIC BLOOD PRESSURE: 124 MMHG | DIASTOLIC BLOOD PRESSURE: 80 MMHG | BODY MASS INDEX: 34.73 KG/M2 | WEIGHT: 196 LBS | OXYGEN SATURATION: 92 % | HEIGHT: 63 IN | HEART RATE: 136 BPM

## 2022-02-24 DIAGNOSIS — I63.412 CEREBROVASCULAR ACCIDENT (CVA) DUE TO EMBOLISM OF LEFT MIDDLE CEREBRAL ARTERY (HCC): ICD-10-CM

## 2022-02-24 DIAGNOSIS — R06.02 SHORTNESS OF BREATH: ICD-10-CM

## 2022-02-24 DIAGNOSIS — D68.52 PROTHROMBIN MUTATION (HCC): Chronic | ICD-10-CM

## 2022-02-24 DIAGNOSIS — R09.02 HYPOXIA: ICD-10-CM

## 2022-02-24 DIAGNOSIS — Z87.891 FORMER SMOKER: Chronic | ICD-10-CM

## 2022-02-24 DIAGNOSIS — U07.1 COVID-19 VIRUS INFECTION: Primary | ICD-10-CM

## 2022-02-24 DIAGNOSIS — D68.59 HYPERCOAGULABLE STATE (HCC): ICD-10-CM

## 2022-02-24 DIAGNOSIS — I27.20 PULMONARY HYPERTENSION (HCC): ICD-10-CM

## 2022-02-24 DIAGNOSIS — D68.52 PROTHROMBIN MUTATION (HCC): ICD-10-CM

## 2022-02-24 DIAGNOSIS — Z86.73 HISTORY OF CVA (CEREBROVASCULAR ACCIDENT): ICD-10-CM

## 2022-02-24 DIAGNOSIS — Z86.711 HX OF PULMONARY EMBOLUS: ICD-10-CM

## 2022-02-24 DIAGNOSIS — I10 BENIGN ESSENTIAL HYPERTENSION: Primary | ICD-10-CM

## 2022-02-24 PROBLEM — J40 BRONCHITIS: Status: RESOLVED | Noted: 2019-08-22 | Resolved: 2022-02-24

## 2022-02-24 PROBLEM — K51.90 ULCERATIVE COLITIS (HCC): Status: ACTIVE | Noted: 2017-03-21

## 2022-02-24 PROBLEM — J18.9 COMMUNITY ACQUIRED PNEUMONIA OF LEFT LOWER LOBE OF LUNG: Status: RESOLVED | Noted: 2022-01-21 | Resolved: 2022-02-24

## 2022-02-24 PROCEDURE — 1111F DSCHRG MED/CURRENT MED MERGE: CPT | Performed by: INTERNAL MEDICINE

## 2022-02-24 PROCEDURE — 94618 PULMONARY STRESS TESTING: CPT | Performed by: INTERNAL MEDICINE

## 2022-02-24 PROCEDURE — 3008F BODY MASS INDEX DOCD: CPT | Performed by: INTERNAL MEDICINE

## 2022-02-24 PROCEDURE — 1036F TOBACCO NON-USER: CPT | Performed by: INTERNAL MEDICINE

## 2022-02-24 PROCEDURE — 99214 OFFICE O/P EST MOD 30 MIN: CPT | Performed by: INTERNAL MEDICINE

## 2022-02-24 PROCEDURE — 99214 OFFICE O/P EST MOD 30 MIN: CPT | Performed by: PHYSICIAN ASSISTANT

## 2022-02-24 PROCEDURE — 1160F RVW MEDS BY RX/DR IN RCRD: CPT | Performed by: INTERNAL MEDICINE

## 2022-02-24 RX ORDER — DABIGATRAN ETEXILATE 150 MG/1
150 CAPSULE, COATED PELLETS ORAL EVERY 12 HOURS SCHEDULED
Qty: 60 CAPSULE | Refills: 0 | Status: SHIPPED | OUTPATIENT
Start: 2022-02-24 | End: 2022-04-08 | Stop reason: SDUPTHER

## 2022-02-24 NOTE — ASSESSMENT & PLAN NOTE
·  History of DVT and PE in the past   Previously was on Eliquis 5 mg twice a day compliant with her medication  She was found to have bilateral PEs in the present setting of COVID-19 infection  · Eliquis was discontinued and she was started on Pradaxa 150 mg twice a day   · Upcoming appointment with Hematology  · In the past she was maintained on Coumadin without any issues      · Due to expense of Pradaxa willing to consider going back on to Coumadin

## 2022-02-24 NOTE — ASSESSMENT & PLAN NOTE
· Recently quit smoking and has a half a pack daily smoking history for 50 years  · Needs continued chest x-ray follow-up and will need complete pulmonary function test to assess for COPD given her chronic shortness of breath

## 2022-02-24 NOTE — ASSESSMENT & PLAN NOTE
· Had pulmonary embolism on presentation to the hospital despite chronic anticoagulation   · Has transition to Pradaxa  · Has follow-up scheduled with Hematology    Has known prothrombin gene mutation with hypercoagulability and prior stroke

## 2022-02-24 NOTE — ASSESSMENT & PLAN NOTE
·   She notes that her blood pressure has improved since quitting smoking  ·  BP goal less than 130/80

## 2022-02-24 NOTE — ASSESSMENT & PLAN NOTE
· Suspect that she does have COPD  · Continue Breo Ellipta  · Had thrush in the hospital   Reviewed importance of rinsing after use Breo  · Advised to continue nebulizer use  · Reports that she does not need refills today

## 2022-02-24 NOTE — ASSESSMENT & PLAN NOTE
·  Left MCA stroke status post tPA and mechanical thrombectomy in October of 2021  ·  currently on Pradaxa 150 mg twice a day, aspirin 81 mg daily and atorvastatin 40 mg daily   · On stroke workup found to have PFO, upcoming appointment with Cardiology   · History of prothrombin mutation    Upcoming appointment with Hematology  · Blood pressure goal less than 130/80   · LDL goal and less than 100   · Glycemic control deferred to primary care  · Recently stopped smoking in January of 2022   · No history of sleep apnea but does have an upcoming sleep study test     · Continue physical therapy

## 2022-02-24 NOTE — PROGRESS NOTES
Progress note - Pulmonary Medicine   Kali Flanagan 77 y o  female MRN: [id number]       Impression & Plan:     COVID-19 virus infection  · Patient was treated for multifocal COVID pneumonia although never tested positive for COVID  She had a late presentation about 2 weeks after the initiation of symptoms  · Had multiple family contacts with active COVID infection and had symptoms in imaging consistent with COVID infection  · Required oxygen on hospital discharge but has been weaning herself off oxygen  · But chest x-ray ordered for April Hx of pulmonary embolus  · Had pulmonary embolism on presentation to the hospital despite chronic anticoagulation   · Has transition to Pradaxa  · Has follow-up scheduled with Hematology    Has known prothrombin gene mutation with hypercoagulability and prior stroke    Former smoker  · Recently quit smoking and has a half a pack daily smoking history for 50 years  · Needs continued chest x-ray follow-up and will need complete pulmonary function test to assess for COPD given her chronic shortness of breath    Shortness of breath  · Suspect that she does have COPD  · Continue Breo Ellipta  · Had thrush in the hospital   Reviewed importance of rinsing after use Breo  · Advised to continue nebulizer use  · Reports that she does not need refills today    Pulmonary hypertension ([address] )  · Likely secondary to the pulmonary embolism  · Will eventually need repeat echocardiography in 6 months  · She does have follow-up in the next few weeks with Cardiology    Hypoxia  · Suspect that the degree of hypoxia is still persistent from recent COVID infection   · Likely contribution from COPD and pulmonary hypertension as well however  · May require long-term oxygen therapy and this will be re-evaluated at upcoming follow-up in May  · Advised to continue utilizing supplemental oxygen regularly with activity    Follow-up in May  ______________________________________________________________________    HPI:    Pat Verde presents today for follow-up of recent hospitalization for presumed COVID-19 pneumonia based on symptoms, imaging, and exposure history  She is currently doing better  She is slowly weaning herself off oxygen  She still has some residual cough  She has been very inconsistent about taking Breo Ellipta due to thrush that she had in the hospital   She is somewhat hesitant to return to taking this consistently  She has not had follow-up imaging since the hospital stay  She was discharged with supplemental oxygen but has been gradually weaning herself off  She is not using it at rest but does continue to use it when she feels short of breath or with certain activities  She does not have any chest pain or cardiac complaints  She did have pulmonary embolism during the hospital stay  She has a history of prothrombin gene mutation and hypercoagulability  She has upcoming follow-up with Hematology and was transition from her Eliquis to Pradaxa for long-term anticoagulation  No significant weight or appetite change  No lightheadedness or dizziness  She does report some chronic sensation of ear congestion and has seen ENT in the past   No syncopal symptoms  No new arthralgias or myalgias  She does walk with a cane      Current Medications:    Current Outpatient Medications:     acetaminophen (TYLENOL) 325 mg tablet, Take 2 tablets (650 mg total) by mouth every 6 (six) hours as needed for fever, Disp: , Rfl: 0    albuterol (2 5 mg/3 mL) 0 083 % nebulizer solution, Take 3 mL (2 5 mg total) by nebulization every 4 (four) hours as needed for wheezing or shortness of breath, Disp: 3 mL, Rfl: 0    aspirin (ECOTRIN LOW STRENGTH) 81 mg EC tablet, Take 1 tablet (81 mg total) by mouth daily, Disp: 60 tablet, Rfl: 0    atorvastatin (LIPITOR) 40 mg tablet, Take 1 tablet (40 mg total) by mouth every evening, Disp: 60 tablet, Rfl: 0    benzonatate (TESSALON PERLES) 100 mg capsule, Take 1 capsule (100 mg total) by mouth 3 (three) times a day as needed for cough, Disp: 20 capsule, Rfl: 0    cyclobenzaprine (FLEXERIL) 5 mg tablet, Take 1 tablet (5 mg total) by mouth 3 (three) times a day as needed for muscle spasms, Disp: 20 tablet, Rfl: 0    dabigatran etexilate (PRADAXA) 150 mg capsu, Take 1 capsule (150 mg total) by mouth every 12 (twelve) hours, Disp: 60 capsule, Rfl: 0    FLUoxetine (PROzac) 10 mg capsule, Take 3 capsules (30 mg total) by mouth daily, Disp: 120 capsule, Rfl: 0    fluticasone-vilanterol (BREO ELLIPTA) 100-25 mcg/inh inhaler, Inhale 1 puff daily Rinse mouth after use , Disp: 60 blister, Rfl: 0    gabapentin (NEURONTIN) 300 mg capsule, Take 300 mg by mouth 3 (three) times a day, Disp: , Rfl:     nystatin (MYCOSTATIN) 500,000 units/5 mL suspension, Apply 5 mL (500,000 Units total) to the mouth or throat 4 (four) times a day, Disp: 473 mL, Rfl: 0    pantoprazole (PROTONIX) 20 mg tablet, TAKE 1 TABLET BY MOUTH EVERY DAY, Disp: 90 tablet, Rfl: 1    Review of Systems:  Aside from what is mentioned in the HPI, the review of systems is otherwise negative    Past medical history, surgical history, and family history were reviewed and updated as appropriate    Social history updates:  Social History     Tobacco Use   Smoking Status Former Smoker    Packs/day: 0 50    Years: 50 00    Pack years: 25 00    Types: Cigarettes    Quit date: 2022    Years since quittin 0   Smokeless Tobacco Never Used       PhysicalExamination:  Vitals:   /80 (BP Location: Left arm, Patient Position: Sitting)   Pulse (!) 136   Temp (!) 97 2 °F (36 2 °C)   Ht 5' 3" (1 6 m)   Wt 88 9 kg (196 lb)   SpO2 92%   BMI 34 72 kg/m²   Gen:  Comfortable on room air at rest   No conversational dyspnea  HEENT:  Conjugate gaze  sclerae anicteric  Oropharynx moist  Neck: Trachea is midline  No JVD   No adenopathy  Chest:  Symmetric chest wall excursion  She has a few minor crackles at the right base  The remaining lung fields are clear  Cardiac:  Regular  no murmur  Abdomen:  benign  Extremities: No edema  Neuro:  Normal speech and mentation    Diagnostic Data:  Labs: I personally reviewed the most recent laboratory data pertinent to today's visit    Lab Results   Component Value Date    WBC 9 89 01/31/2022    HGB 13 2 01/31/2022    HCT 39 6 01/31/2022    MCV 97 01/31/2022     01/31/2022     Lab Results   Component Value Date    SODIUM 134 (L) 01/31/2022    K 5 0 01/31/2022    CO2 30 01/31/2022     01/31/2022    BUN 45 (H) 01/31/2022    CREATININE 1 06 01/31/2022    CALCIUM 9 0 01/31/2022       6 minute walk testing performed in the office today showed resting room air saturation of 94% with a heart rate of 68  With ambulation on room air, she quickly desaturated to 88% with heart rate of 157 beats per minute  At 2 minutes was 87%  With addition of supplemental oxygen at 2 L oxygen saturation ranged between 90 and 93%  Continue to have elevated heart rate at 147-167 beats per minute    Imaging:  I personally reviewed the images on the [facility] system pertinent to today's visit  I reviewed her CT scan of the chest from the hospital stay in January  This did show evidence of pulmonary embolism and multifocal pulmonary opacities consistent with the presumed diagnosis of COVID-19    Other studies:  Echocardiogram performed in the hospital showed moderate elevation in the pulmonary artery pressure at 44 mmHg with mild dilation of the right ventricle    Left ventricular ejection fraction was 55% with normal diastolic function    Lynwood Severin, MD

## 2022-02-24 NOTE — ASSESSMENT & PLAN NOTE
· Patient was treated for multifocal COVID pneumonia although never tested positive for COVID    She had a late presentation about 2 weeks after the initiation of symptoms  · Had multiple family contacts with active COVID infection and had symptoms in imaging consistent with COVID infection  · Required oxygen on hospital discharge but has been weaning herself off oxygen  · But chest x-ray ordered for April

## 2022-02-24 NOTE — ASSESSMENT & PLAN NOTE
· Suspect that the degree of hypoxia is still persistent from recent COVID infection   · Likely contribution from COPD and pulmonary hypertension as well however  · May require long-term oxygen therapy and this will be re-evaluated at upcoming follow-up in May  · Advised to continue utilizing supplemental oxygen regularly with activity

## 2022-02-24 NOTE — PATIENT INSTRUCTIONS
 Continue with combination of Pradaxa 150mg twice a day, aspirin 81mg daily and atorvastatin 40mg daily for secondary stroke prevention   BP goal < 130/80, BP is at goal     LDL goal < 100   Defer to PCP regarding glycemic and blood pressure management   Continue physical therapy and home health aide coming to the house twice a week    Counseling    Follow up as scheduled with cardiology and hematology      I advised patient to avoid using NSAIDs for headaches or other pain and to stick to tylenol if needed   Recommend mediterranean diet & regular exercise regimen atleast 4-5 times a week for 20-30 minutes   Educated patient/family regarding medication compliance    Recommend follow up 6 months  I would be happy to see the patient sooner if any new questions/concerns arise  Patient/Guardian was advised to the call the office if they have any questions and concerns in the meantime  Patient/Guardian does understand that if they have any new stroke like symptoms such as facial droop on one side, weakness/paralysis on either side, speech trouble, numbness on one side, balance issues, any vision changes, extreme dizziness or any new headache, to call 9-1-1 immediately or to proceed to the nearest ER immediately

## 2022-02-24 NOTE — PROGRESS NOTES
Review of Systems   Constitutional: Negative  Negative for appetite change and fever  HENT: Negative  Negative for hearing loss, tinnitus, trouble swallowing and voice change  Eyes: Negative  Negative for photophobia and pain  Respiratory: Positive for shortness of breath  Cardiovascular: Negative  Negative for palpitations  Gastrointestinal: Negative  Negative for nausea and vomiting  Endocrine: Negative  Negative for cold intolerance  Genitourinary: Negative  Negative for dysuria, frequency and urgency  Musculoskeletal: Positive for gait problem  Negative for myalgias and neck pain  Skin: Negative  Negative for rash  Neurological: Negative for dizziness, tremors, seizures, syncope, facial asymmetry, speech difficulty, weakness, light-headedness, numbness and headaches  Hematological: Negative  Does not bruise/bleed easily  Psychiatric/Behavioral: Negative  Negative for confusion, hallucinations and sleep disturbance

## 2022-02-24 NOTE — PROGRESS NOTES
Patient ID: Reginald Elam is a 77 y o  female       Assessment/Plan:        Problem List Items Addressed This Visit        Respiratory    Hypoxia     ·  Found to have bilateral pulmonary embolus in January of 2022   · On 2 L of oxygen (although left it in her car for the appointment)            Cardiovascular and Mediastinum    Benign essential hypertension - Primary     ·   She notes that her blood pressure has improved since quitting smoking  ·  BP goal less than 130/80            Other    Prothrombin mutation (HCC) (Chronic)     ·  History of DVT and PE in the past   Previously was on Eliquis 5 mg twice a day compliant with her medication  She was found to have bilateral PEs in the present setting of COVID-19 infection  · Eliquis was discontinued and she was started on Pradaxa 150 mg twice a day   · Upcoming appointment with Hematology  · In the past she was maintained on Coumadin without any issues  · Due to expense of Pradaxa willing to consider going back on to Coumadin         Hx of pulmonary embolus (Chronic)    Relevant Medications    dabigatran etexilate (PRADAXA) 150 mg capsu    History of CVA (cerebrovascular accident)     ·  Left MCA stroke status post tPA and mechanical thrombectomy in October of 2021  ·  currently on Pradaxa 150 mg twice a day, aspirin 81 mg daily and atorvastatin 40 mg daily   · On stroke workup found to have PFO, upcoming appointment with Cardiology   · History of prothrombin mutation    Upcoming appointment with Hematology  · Blood pressure goal less than 130/80   · LDL goal and less than 100   · Glycemic control deferred to primary care  · Recently stopped smoking in January of 2022   · No history of sleep apnea but does have an upcoming sleep study test     · Continue physical therapy         Relevant Medications    dabigatran etexilate (PRADAXA) 150 mg capsu             History of Present Illness:   Reginald Elam is a 77 y o  right handed female Past medical history including hypertension, hyperlipidemia, prothrombin mutation, tobacco use, CKD, GERD, depression, endometrial/ uterine cancer who presents in outpatient neurology clinic for follow up on her left MCA infarct that occurred 10/2021  Joe Mcdowell  was last seen in clinic on 11/26/2021  as you know Joe Mcdowell presented to [facility] with symptoms of acute aphasia, confusion, right-sided weakness and dysarthria at  CTA showed abrupt cutoff at the distal left and 1 segment  She received tPA and underwent mechanical thrombectomy with TICI 2B revascularization  MRI brain with acute left temporal lobe infarct and punctate acute infarct within the left frontal lobe  She was maintained on aspirin 81 mg, Eliquis 5 mg twice a day and atorvastatin 40 mg daily  Unfortunately, at the end of January she was was in the hospital for breathing issues, found to have bilateral PEs while on Eliquis  She has been switched to Pradaxa 150mg twice a day, aspirin 81mg daily and atorvastatin 40mg daily  She is compliant with her medication and denies any additional stroke like symptoms  Since her hospitalization she has been getting home health twice a week and physical therapy twice a week  She is using a cane for gait assistance and denies any falls at home  She is also currently maintained on oxygen 2 L, although she does not wear this to bed  She has since quit smoking    She has upcoming appointment with hematology on 3/2/2022  Sleep is now good, does not sleep with oxygen on  Estimates about 6 hours  Mood has been good  Currently on prozac 30 mg           Past Medical history:     Past Medical History:   Diagnosis Date    Acid reflux     Anxiety     Arthritis     Back pain     Carpal tunnel syndrome, bilateral     Depression     Endometrial cancer ([facility] [address] ) 1997    Hiatal hernia     History of DVT of lower extremity     Right leg-had IVc filter  removal today-6/6/2016    History of pulmonary embolism     History of uterine cancer     Was stage 3- uterine, ovaries-hysterectomy    Hypercholesteremia     Hypertension     Left leg pain     Lumbar herniated disc     Tumor cells, benign     In right atrium   Probably congenital     Varicose vein of leg     Wears glasses        Patient Active Problem List   Diagnosis    Osteoarthritis of left hip    Benign essential hypertension    Chronic insomnia    Depressive disorder    Dyslipidemia    Dyspepsia    Gastroesophageal reflux disease without esophagitis    Hip arthritis    Hyperlipidemia    Irritable bowel syndrome    [facility]coagulable state ([facility] )    Spinal stenosis of lumbar region    Vitamin D deficiency    Prothrombin mutation ([facility] )    History of CVA (cerebrovascular accident)    Hx of pulmonary embolus    Obesity, morbid ([facility] )    COVID-19 virus infection    Ulcerative colitis ([facility] )    Former smoker    Shortness of breath    Pulmonary hypertension (HCC)    Hypoxia       Medications:      Current Outpatient Medications   Medication Sig Dispense Refill    acetaminophen (TYLENOL) 325 mg tablet Take 2 tablets (650 mg total) by mouth every 6 (six) hours as needed for fever  0    albuterol (2 5 mg/3 mL) 0 083 % nebulizer solution Take 3 mL (2 5 mg total) by nebulization every 4 (four) hours as needed for wheezing or shortness of breath 3 mL 0    aspirin (ECOTRIN LOW STRENGTH) 81 mg EC tablet Take 1 tablet (81 mg total) by mouth daily 60 tablet 0    atorvastatin (LIPITOR) 40 mg tablet Take 1 tablet (40 mg total) by mouth every evening 60 tablet 0    benzonatate (TESSALON PERLES) 100 mg capsule Take 1 capsule (100 mg total) by mouth 3 (three) times a day as needed for cough 20 capsule 0    cyclobenzaprine (FLEXERIL) 5 mg tablet Take 1 tablet (5 mg total) by mouth 3 (three) times a day as needed for muscle spasms 20 tablet 0    dabigatran etexilate (PRADAXA) 150 mg capsu Take 1 capsule (150 mg total) by mouth every 12 (twelve) hours 60 capsule 0  FLUoxetine (PROzac) 10 mg capsule Take 3 capsules (30 mg total) by mouth daily 120 capsule 0    fluticasone-vilanterol (BREO ELLIPTA) 100-25 mcg/inh inhaler Inhale 1 puff daily Rinse mouth after use  60 blister 0    gabapentin (NEURONTIN) 300 mg capsule Take 300 mg by mouth 3 (three) times a day      nystatin (MYCOSTATIN) 500,000 units/5 mL suspension Apply 5 mL (500,000 Units total) to the mouth or throat 4 (four) times a day (Patient not taking: Reported on 2022 ) 473 mL 0    pantoprazole (PROTONIX) 20 mg tablet TAKE 1 TABLET BY MOUTH EVERY DAY 90 tablet 1     No current facility-administered medications for this visit  Allergies: Allergies   Allergen Reactions    Percocet [Oxycodone-Acetaminophen] Hives    Amlodipine     Percocet  [Oxycodone-Acetaminophen]        Family History:     Family History   Problem Relation Age of Onset    Heart failure Father     Colon cancer Father 62    Arthritis Family        Social History:     Social History     Socioeconomic History    Marital status:       Spouse name: Not on file    Number of children: 1    Years of education: Not on file    Highest education level: Not on file   Occupational History    Occupation: retired   Tobacco Use    Smoking status: Former Smoker     Packs/day: 0 50     Years: 50 00     Pack years: 25 00     Types: Cigarettes     Quit date: 2022     Years since quittin 0    Smokeless tobacco: Never Used   Vaping Use    Vaping Use: Never used   Substance and Sexual Activity    Alcohol use: Yes     Comment: 2 or 3 monthly    Drug use: No    Sexual activity: Not Currently     Partners: Male   Other Topics Concern    Not on file   Social History Narrative    Coffee    Daily caffeinated cola consumption     Social Determinants of Health     Financial Resource Strain: Not on file   Food Insecurity: No Food Insecurity    Worried About Running Out of Food in the Last Year: Never true    Bladimir of Food in the Last Year: Never true   Transportation Needs: No Transportation Needs    Lack of Transportation (Medical): No    Lack of Transportation (Non-Medical): No   Physical Activity: Not on file   Stress: Not on file   Social Connections: Not on file   Intimate Partner Violence: Not on file   Housing Stability: Low Risk     Unable to Pay for Housing in the Last Year: No    Number of Places Lived in the Last Year: 1    Unstable Housing in the Last Year: No           The following portions of the patient's history were reviewed and updated as appropriate: allergies, current medications, past family history, past medical history, past social history, past surgical history and problem list    Review of Systems:   Review of Systems  Constitutional: Negative  Negative for appetite change and fever  HENT: Negative  Negative for hearing loss, tinnitus, trouble swallowing and voice change  Eyes: Negative  Negative for photophobia and pain  Respiratory: Positive for shortness of breath  Cardiovascular: Negative  Negative for palpitations  Gastrointestinal: Negative  Negative for nausea and vomiting  Endocrine: Negative  Negative for cold intolerance  Genitourinary: Negative  Negative for dysuria, frequency and urgency  Musculoskeletal: Positive for gait problem  Negative for myalgias and neck pain  Skin: Negative  Negative for rash  Neurological: Negative for dizziness, tremors, seizures, syncope, facial asymmetry, speech difficulty, weakness, light-headedness, numbness and headaches  Hematological: Negative  Does not bruise/bleed easily  Psychiatric/Behavioral: Negative  Negative for confusion, hallucinations and sleep disturbance  ROS reviewed personally and edited as needed      Objective:   Physical Exam:  /75 (BP Location: Left arm, Patient Position: Sitting, Cuff Size: Standard)   Pulse (!) 140   Temp (!) 95 7 °F (35 4 °C) (Temporal)   Ht 5' 3" (1 6 m)   Wt 88 9 kg (196 lb)   BMI 34 72 kg/m²     Physical Exam  Eyes:      Extraocular Movements: EOM normal       Pupils: Pupils are equal, round, and reactive to light  Neurological:      Mental Status: She is oriented to person, place, and time  Coordination: Finger-Nose-Finger Test normal       Deep Tendon Reflexes: Strength normal       Reflex Scores:       Bicep reflexes are 1+ on the right side and 1+ on the left side  Brachioradialis reflexes are 1+ on the right side and 1+ on the left side  Patellar reflexes are 1+ on the right side and 1+ on the left side  Achilles reflexes are 1+ on the right side and 1+ on the left side  Psychiatric:         Speech: Speech normal         Neurologic Exam     Mental Status   Oriented to person, place, and time  Follows 2 step commands  Attention: normal  Concentration: normal    Speech: speech is normal   Level of consciousness: alert  Knowledge: good  Normal comprehension  Cranial Nerves     CN III, IV, VI   Pupils are equal, round, and reactive to light  Extraocular motions are normal    Right pupil: Size: 3 mm  Shape: regular  Reactivity: brisk  Left pupil: Size: 3 mm  Shape: regular  Reactivity: brisk  CN III: no CN III palsy  CN VI: no CN VI palsy  Nystagmus: none   Diplopia: none  Ophthalmoparesis: none  Upgaze: normal  Downgaze: normal  Conjugate gaze: present    CN V   Facial sensation intact  CN VII   Facial expression full, symmetric  CN VIII   Hearing: intact    CN IX, X   Palate: symmetric    CN XI   Right trapezius strength: normal  Left trapezius strength: normal    CN XII   Tongue: not atrophic  Fasciculations: absent  Tongue deviation: none    Motor Exam   Right arm pronator drift: absent  Left arm pronator drift: absent    Strength   Strength 5/5 throughout       Sensory Exam   Light touch normal      Gait, Coordination, and Reflexes     Coordination   Finger to nose coordination: normal    Tremor   Action tremor: left greater than right hand tremor  Reflexes   Right brachioradialis: 1+  Left brachioradialis: 1+  Right biceps: 1+  Left biceps: 1+  Right patellar: 1+  Left patellar: 1+  Right achilles: 1+  Left achilles: 1+  Right ankle clonus: absent  Left ankle clonus: absentUses cane for gait assistance            I have spent 30 minutes with Patient  today in which greater than 50% of this time was spent in counseling/coordination of care regarding Diagnostic results, Prognosis, Risks and benefits of tx options, Intructions for management, Patient and family education, Importance of tx compliance, Risk factor reductions, Impressions and Plan of care as above

## 2022-02-24 NOTE — ASSESSMENT & PLAN NOTE
·  Found to have bilateral pulmonary embolus in January of 2022   · On 2 L of oxygen (although left it in her car for the appointment)

## 2022-02-24 NOTE — ASSESSMENT & PLAN NOTE
· Likely secondary to the pulmonary embolism  · Will eventually need repeat echocardiography in 6 months  · She does have follow-up in the next few weeks with Cardiology

## 2022-03-01 ENCOUNTER — TELEPHONE (OUTPATIENT)
Dept: PULMONOLOGY | Facility: CLINIC | Age: 67
End: 2022-03-01

## 2022-03-01 ENCOUNTER — HOSPITAL ENCOUNTER (OUTPATIENT)
Dept: MAMMOGRAPHY | Facility: MEDICAL CENTER | Age: 67
Discharge: HOME | End: 2022-03-01
Payer: COMMERCIAL

## 2022-03-01 VITALS — WEIGHT: 195.99 LBS | HEIGHT: 63 IN | BODY MASS INDEX: 34.73 KG/M2

## 2022-03-01 DIAGNOSIS — Z12.31 VISIT FOR SCREENING MAMMOGRAM: ICD-10-CM

## 2022-03-01 PROCEDURE — 77067 SCR MAMMO BI INCL CAD: CPT

## 2022-03-01 PROCEDURE — 77063 BREAST TOMOSYNTHESIS BI: CPT

## 2022-03-01 NOTE — TELEPHONE ENCOUNTER
Charity Faulkner, from [address], called re: they will be discharging Albert Perera this Thursday  She wanted to know if Dr Chris Lowry could complete a referral for Pulmonary Rehab for Albert Perera  She would like to go to the [address] for the rehab     Please advise Kwabena Zelaya): [phone]

## 2022-03-03 ENCOUNTER — CONSULT (OUTPATIENT)
Dept: HEMATOLOGY ONCOLOGY | Facility: CLINIC | Age: 67
End: 2022-03-03
Payer: COMMERCIAL

## 2022-03-03 VITALS
SYSTOLIC BLOOD PRESSURE: 130 MMHG | BODY MASS INDEX: 35.26 KG/M2 | TEMPERATURE: 97.8 F | OXYGEN SATURATION: 97 % | DIASTOLIC BLOOD PRESSURE: 78 MMHG | WEIGHT: 199 LBS | RESPIRATION RATE: 16 BRPM | HEART RATE: 105 BPM | HEIGHT: 63 IN

## 2022-03-03 DIAGNOSIS — Z86.73 HISTORY OF CVA (CEREBROVASCULAR ACCIDENT): ICD-10-CM

## 2022-03-03 DIAGNOSIS — Z79.01 ANTICOAGULATION MANAGEMENT ENCOUNTER: ICD-10-CM

## 2022-03-03 DIAGNOSIS — Z51.81 ANTICOAGULATION MANAGEMENT ENCOUNTER: ICD-10-CM

## 2022-03-03 DIAGNOSIS — Z86.73 RECENT CEREBROVASCULAR ACCIDENT (CVA): ICD-10-CM

## 2022-03-03 DIAGNOSIS — D68.59 HYPERCOAGULABLE STATE (HCC): ICD-10-CM

## 2022-03-03 DIAGNOSIS — D68.52 PROTHROMBIN MUTATION (HCC): Primary | Chronic | ICD-10-CM

## 2022-03-03 DIAGNOSIS — Z86.711 HX OF PULMONARY EMBOLUS: Chronic | ICD-10-CM

## 2022-03-03 PROCEDURE — 3075F SYST BP GE 130 - 139MM HG: CPT | Performed by: PHYSICIAN ASSISTANT

## 2022-03-03 PROCEDURE — 1160F RVW MEDS BY RX/DR IN RCRD: CPT | Performed by: PHYSICIAN ASSISTANT

## 2022-03-03 PROCEDURE — 1036F TOBACCO NON-USER: CPT | Performed by: PHYSICIAN ASSISTANT

## 2022-03-03 PROCEDURE — 3008F BODY MASS INDEX DOCD: CPT | Performed by: PHYSICIAN ASSISTANT

## 2022-03-03 PROCEDURE — 3078F DIAST BP <80 MM HG: CPT | Performed by: PHYSICIAN ASSISTANT

## 2022-03-03 PROCEDURE — 99215 OFFICE O/P EST HI 40 MIN: CPT | Performed by: PHYSICIAN ASSISTANT

## 2022-03-03 NOTE — PROGRESS NOTES
Hematology/Oncology Outpatient Follow- up Note  Neema Edmonds 77 y o  female MRN: @ Encounter: [id number]        Date:  3/3/2022      Assessment / Plan:    1  History of DVT 2010 while actively smoking on OCPs treated with anticoagulation  2  History of PE 12/2015 after right hip replacement  3  Heterozygous for prothrombin gene mutation  4  S/P 4/2016 L hip replacement  Bridged from coumadin to Lovenox  IVC filter placed presurgery and removed 2 months later  Coumadin discontinued summer 2016       5   She was instructed to take ASA 81mg po daily since 2018  Patient reports non compliance  6   CVA 10/2021  She received TPA  BRAYDEN which showed PFO  started on Eliquis in setting of her hypercoagulable state and new stroke  Advised to take aspirin as well  7   Covid pneumonia 1/2022  Diffuse PE with right heart strain diagnosed 1/2022  No evidence of BLE clot on venous doppler  Eliquis discontinued  Pradaxa 150 mg p o  B i d rx  Reviewed with patient that indefinite anticoagulation is warranted giving her multiple clotting events  She currently is on Pradaxa 150 milligrams p o  b i d   she has obtained co-pay card which has helped offset the cost of this medication  We did discuss alternative of Coumadin  Patient reports she wishes to continue with Pradaxa  Compliance with taking aspirin 81 milligrams daily also encouraged  Avoidance of NSAID use recommended  She will f/u with cardiology regarding PFO  At this time will see patient prn  Continue with Pradaxa as prescribed per neurology or PCP  She did quit smoking January 2022  Ongoing cessation encouraged            HPI:  Neema Edmonds is a 61 y o  seen 10/31/2018 at the referral of Lurdes Trimble MD regarding heterozygous prothrombin gene mutation and history of DVT in 2010 and pulmonary embolism 12/2015 after she had a right hip replacement  2/2016 she met with Dr Ashatni Quevedo   It was noted that she had a history or RLE DVT 5/2010 when she was on OCPs and smoking  Her mother has a history of DVT  Pito Santos was tested at [facility] and was found to have prothrombin gene mutation, heterozygous  She was treated with Lovenox and then Coumadin and then discontinued  She underwent right total hip replacement in December 4348 complicated with small pulmonary embolus in the right lower lobe and was on coumadin  Recommendations at the 2/2016 office visit were that the "patient to continue Coumadin and prior to her elective date of left hip replacement I suggest strongly IVC filter by IR and bridged the patient with Lovenox 40 mg for 3 days and stop one day before the surgery and then resume Lovenox at 40 mg after the surgery and a bridge with Coumadin to keep INR between 2 and 3 for at least 3 more months  #6  IVC filter could be retrieved within 90 days after the surgery by IR "     4/13/2016 she had LEFT total hip arthoplasty  "Given her hx of a PE, an IVC filter was placed prior to her surgery that same day  Postoperatively she was started on coumadin with a lovenox bridge and sequential compression pumps as well as JAYA stockings were used for DVT prophylaxis"   6/2016 she had IVC filter removed  She completed 3 months of coumadin  States she was very happy when she was told she did not have to take any additional coumadin/"blood thinners " She used to take ASA 81mg po daily but has not in some time  At her 10/2018 appointment, we reviewed history, increased factors for clotting and options  Ultimately she was agreeable to take ASA 81mg po daily  Interval History:  Patient admitted 10/20-10/24/21 due to stroke like symptoms of aphasia, confusion, dysarthria and right upper extremity weakness  Patient received tPA and she also had thrombectomy with interval left M1 lysis by tPA and scattered distal emboli in left M4 segment  Patient had a BRAYDEN which showed PFO     Patient was started on Eliquis in setting of her hypercoagulable state and new stroke  Advised to take aspirin as well  Admitted 1/21 - 1/31/22 due to acute respiratory failure on hypoxia  She had covid exposure 12/2021  Her CT scan appearance was consistent with COVID pneumonia  She received dexamethasone and baricitinib, discharged home with the steroid taper with prednisone  She was discharged on oxygen  CTA 1/2022: Peripheral pulmonary emboli seen in several segmental and subsegmental branches in the left upper lobe as well as partially occlusive thrombus in the distal left main pulmonary artery extending into the left lower lobe segmental and subsegmental branches  1/24/22: BLE venous dopplers: No evidence of acute or chronic deep vein thrombosis  Future cardiology consult for consideration of PFO closure     Discharged home on Pradaxa 150 mg p o  B i d  Patient reports that she was not taking aspirin regularly prior to her admission for her stroke  She states she was taking Eliquis as prescribed prior to her January 2022 admission  She reports she is feeling well  Undergoing physical therapy  Denies any increased bruising or bleeding  Test Results:        Labs:   Lab Results   Component Value Date    HGB 13 2 01/31/2022    HCT 39 6 01/31/2022    MCV 97 01/31/2022     01/31/2022    WBC 9 89 01/31/2022    NRBC 0 01/29/2022     Lab Results   Component Value Date     12/31/2015    K 5 0 01/31/2022     01/31/2022    CO2 30 01/31/2022    ANIONGAP 8 12/31/2015    BUN 45 (H) 01/31/2022    CREATININE 1 06 01/31/2022    GLUCOSE 120 12/31/2015    GLUF 107 (H) 02/21/2020    CALCIUM 9 0 01/31/2022    CORRECTEDCA 9 5 10/21/2021    AST 34 01/21/2022    ALT 13 01/21/2022    ALKPHOS 71 01/21/2022    EGFR 54 01/31/2022       Imaging: No results found  ROS:  As mentioned in HPI & Interval History otherwise 14 point ROS negative  Allergies:    Allergies   Allergen Reactions    Percocet [Oxycodone-Acetaminophen] Hives    Amlodipine     Percocet  [Oxycodone-Acetaminophen]      Current Medications: Reviewed  PMH/FH/SH:  Reviewed      Physical Exam:    1 93 meters squared    Ht Readings from Last 3 Encounters:   03/03/22 5' 3" (1 6 m)   03/01/22 5' 3" (1 6 m)   02/24/22 5' 3" (1 6 m)        Wt Readings from Last 3 Encounters:   03/03/22 90 3 kg (199 lb)   03/01/22 88 9 kg (195 lb 15 8 oz)   02/24/22 88 9 kg (196 lb)        Temp Readings from Last 3 Encounters:   02/24/22 (!) 95 7 °F (35 4 °C) (Temporal)   02/24/22 (!) 97 2 °F (36 2 °C)   02/04/22 98 4 °F (36 9 °C) (Tympanic)        BP Readings from Last 3 Encounters:   02/24/22 112/75   02/24/22 124/80   02/04/22 136/96           Physical Exam  Vitals reviewed  Constitutional:       General: She is not in acute distress  Appearance: She is well-developed  She is not diaphoretic  HENT:      Head: Normocephalic and atraumatic  Eyes:      Conjunctiva/sclera: Conjunctivae normal    Neck:      Trachea: No tracheal deviation  Cardiovascular:      Rate and Rhythm: Normal rate and regular rhythm  Heart sounds: No murmur heard  No friction rub  No gallop  Pulmonary:      Effort: Pulmonary effort is normal  No respiratory distress  Breath sounds: Normal breath sounds  No wheezing or rales  Chest:      Chest wall: No tenderness  Abdominal:      General: There is no distension  Palpations: Abdomen is soft  Tenderness: There is no abdominal tenderness  Musculoskeletal:      Cervical back: Normal range of motion and neck supple  Lymphadenopathy:      Cervical: No cervical adenopathy  Skin:     General: Skin is warm and dry  Coloration: Skin is not pale  Findings: No erythema  Neurological:      Mental Status: She is alert and oriented to person, place, and time  Psychiatric:         Behavior: Behavior normal          Thought Content:  Thought content normal          Judgment: Judgment normal  Emergency Contacts:    Extended Emergency Contact Information  Primary Emergency Contact: Natasha Charles  Mobile Phone: [phone]  Relation: Daughter

## 2022-03-08 ENCOUNTER — TELEPHONE (OUTPATIENT)
Dept: FAMILY MEDICINE CLINIC | Facility: CLINIC | Age: 67
End: 2022-03-08

## 2022-03-08 NOTE — TELEPHONE ENCOUNTER
----- Message from Rajendra Tucker MD sent at 3/7/2022  5:11 PM EST -----  Mammogram does not show any evidence of malignant

## 2022-03-17 ENCOUNTER — EVALUATION (OUTPATIENT)
Dept: PHYSICAL THERAPY | Facility: CLINIC | Age: 67
End: 2022-03-17
Payer: COMMERCIAL

## 2022-03-17 DIAGNOSIS — R29.898 WEAKNESS OF BOTH LEGS: ICD-10-CM

## 2022-03-17 DIAGNOSIS — R26.89 BALANCE DISORDER: Primary | ICD-10-CM

## 2022-03-17 PROCEDURE — 97161 PT EVAL LOW COMPLEX 20 MIN: CPT | Performed by: PHYSICAL THERAPIST

## 2022-03-17 NOTE — PROGRESS NOTES
PT Evaluation     Today's date: 3/17/2022  Patient name: Adair Sam  : 1955  MRN: [id number]  Referring provider: Juan Carlos Martinez MD  Dx:   Encounter Diagnosis     ICD-10-CM    1  Balance disorder  R26 89    2  Weakness of both legs  R29 898                   Assessment  Assessment details: Pt is a 77 y o  female who presents to outpatient PT with  decreased strength, poor balance, poor endurance and decreased functional mobility  She will benefit from skilled PT to address these deficits in order to achieve her goals and maximize her functional mobility  Understanding of Dx/Px/POC: fair   Prognosis: fair    Goals  Short Term Goals: Independent performance of initial hep  Decrease pain 2 points on VAS      Long Term Goals: Independent performance of comprehensive hep  Performance of IADL's is returned to max level of function  Performance in recreational activities is improved to max level of function  Able to walk community distances with no AD  Able to perform sit to stand transfer with no UE assistance  Plan  Planned therapy interventions: IADL retraining, abdominal trunk stabilization, balance, balance/weight bearing training, breathing training, strengthening, stretching, therapeutic activities, therapeutic exercise, therapeutic training, transfer training and home exercise program  Frequency: 2x week  Duration in weeks: 6        Subjective Evaluation    History of Present Illness  Mechanism of injury: Pt report that she suffered a CVA and PE and Covid dx in the past 3 months  Reports that she feels weak and wobbly since then  Denies falls  Currently ambulates with a SPC but would like to be able to walk without it  Is currently on supplemental O2 but is weaning herself off of this  Reports that she has a first floor set-up  Reports that she has a walking tolerance of <5 min  Reports that she requires more arm support for sit to stand transfers then previously   Reports that she would like to return to walking for recreation  Reports that she has recently stopped smoking  History: B/L ELICEO 2015  2016, CVA, PE, fall risk  Patient Goals  Patient goals for therapy: improved balance, increased motion, increased strength and independence with ADLs/IADLs          Objective     Strength/Myotome Testing     Left Hip   Planes of Motion   Flexion: 4  Extension: 4  Abduction: 4-  External rotation: 4  Internal rotation: 4+    Right Hip   Planes of Motion   Extension: 4  Abduction: 4-  External rotation: 4  Internal rotation: 4+    Left Knee   Flexion: 4+  Extension: 4+    Right Knee   Flexion: 4+  Extension: 4+    Left Ankle/Foot   Dorsiflexion: 4-  Plantar flexion: 4  Inversion: 4-  Eversion: 4-    Right Ankle/Foot   Dorsiflexion: 4-  Plantar flexion: 4  Inversion: 4-  Eversion: 4-     Balance:  FT EO: 30 sec  FT EC: 30 with increased sway  FT EO foam: 30 sec increased sway  FT EC foam 12 sec  SLS: L 5 sec  R unable  Tandem stance: unable to achieve the start position    SPO2: 90 at rest drops to 88 with  10 supine SLR's             Precautions: CVA, chronic knee pain, chronic lumbar pain, Monitor pulse-ox reading t/o tx          Manuals                                                                 Neuro Re-Ed             FT EC             FT EC foam             sls             Tandem walking             Alt hr/tr             hurdles                          Ther Ex             Sit to stand             Step ups fwd/lat             Hr/tr                                                                              Ther Activity             bike                          Gait Training                                       Modalities

## 2022-03-21 ENCOUNTER — APPOINTMENT (OUTPATIENT)
Dept: PHYSICAL THERAPY | Facility: CLINIC | Age: 67
End: 2022-03-21
Payer: COMMERCIAL

## 2022-03-24 ENCOUNTER — OFFICE VISIT (OUTPATIENT)
Dept: PHYSICAL THERAPY | Facility: CLINIC | Age: 67
End: 2022-03-24
Payer: COMMERCIAL

## 2022-03-24 DIAGNOSIS — R26.89 BALANCE DISORDER: Primary | ICD-10-CM

## 2022-03-24 DIAGNOSIS — R29.898 WEAKNESS OF BOTH LEGS: ICD-10-CM

## 2022-03-24 PROCEDURE — 97110 THERAPEUTIC EXERCISES: CPT

## 2022-03-24 PROCEDURE — 97140 MANUAL THERAPY 1/> REGIONS: CPT

## 2022-03-24 NOTE — PROGRESS NOTES
Daily Note     Today's date: 3/24/2022  Patient name: Reginald Elam  : 1955  MRN: [id number]  Referring provider: Danie Austin MD  Dx:   Encounter Diagnosis     ICD-10-CM    1  Balance disorder  R26 89    2  Weakness of both legs  R29 898        Start Time: 1101          Subjective: Patient reports she is doing good today  Has no chief complaints or concerns noted upon arrival        Objective: See treatment diary below      Assessment: Tolerated treatment fair, as it was challenging for her to keep her O2 above 90 consistently  Took frequent, short rest breaks to keep O2 from dropping below 90%  Upon arrival prior to starting ex's, pt's O2 ranged from 90-91%  Monitored O2 t/o session as it did drop below 90 to 84-86% but would recover to above 90% pretty quickly with pursed lip breathing  Post treatment, her O2 ranged from 92-96%  Patient demonstrated fatigue post treatment, exhibited good technique with therapeutic exercises and would benefit from continued PT  Plan: Continue per plan of care  Precautions: CVA, chronic knee pain, chronic lumbar pain, B/L ELICEO, Monitor pulse-ox reading t/o tx          Manuals 3/24                                                                Neuro Re-Ed             FT EC 3x10"            FT EC foam 3x10"            sls             Alt step taps 6" 20x Alt            sidestepping X 2 laps @ mirror            Tandem walking X 2 laps @ mirror            Alt hr/tr             hurdles                          Ther Ex             Sit to stand 2x5            Step ups fwd/lat 10 x ea             Hr/tr 2x10 ea            LAQ 2x10 (3")                                                                Ther Activity             bike x4'                          Gait Training                                       Modalities

## 2022-03-27 DIAGNOSIS — I63.412 CEREBROVASCULAR ACCIDENT (CVA) DUE TO EMBOLISM OF LEFT MIDDLE CEREBRAL ARTERY (HCC): ICD-10-CM

## 2022-03-28 ENCOUNTER — APPOINTMENT (OUTPATIENT)
Dept: PHYSICAL THERAPY | Facility: CLINIC | Age: 67
End: 2022-03-28
Payer: COMMERCIAL

## 2022-03-28 DIAGNOSIS — I63.412 CEREBROVASCULAR ACCIDENT (CVA) DUE TO EMBOLISM OF LEFT MIDDLE CEREBRAL ARTERY (HCC): ICD-10-CM

## 2022-03-28 RX ORDER — ATORVASTATIN CALCIUM 40 MG/1
TABLET, FILM COATED ORAL
Qty: 60 TABLET | Refills: 0 | Status: SHIPPED | OUTPATIENT
Start: 2022-03-28 | End: 2022-05-23

## 2022-03-28 RX ORDER — FLUOXETINE 10 MG/1
CAPSULE ORAL
Qty: 120 CAPSULE | Refills: 0 | Status: SHIPPED | OUTPATIENT
Start: 2022-03-28 | End: 2022-05-05

## 2022-03-29 ENCOUNTER — TELEPHONE (OUTPATIENT)
Dept: PSYCHIATRY | Facility: CLINIC | Age: 67
End: 2022-03-29

## 2022-03-29 ENCOUNTER — TELEPHONE (OUTPATIENT)
Dept: NEUROLOGY | Facility: CLINIC | Age: 67
End: 2022-03-29

## 2022-03-29 NOTE — TELEPHONE ENCOUNTER
Yes it can cause hair loss  I would actually defer medication management to hematology   Per their note the other option would be Coumadin since she was previously on Eliquis

## 2022-03-29 NOTE — TELEPHONE ENCOUNTER
Patient called in to report possible side effects of Pradaxa 150 mg BID  Claims she has been having significant hair loss that has been more prominent over the past week or so  When she puts her hand through her hair, she is pulling out handfuls  Patient states she started this med late January/early February  She denies change in other meds, diet, shampoos/conditioners, detergents, hair products  She is asking if this is Pradaxa causing this  If so, is there an alternative?      Best call back [phone] okay with detailed messages

## 2022-03-30 ENCOUNTER — OFFICE VISIT (OUTPATIENT)
Dept: PHYSICAL THERAPY | Facility: CLINIC | Age: 67
End: 2022-03-30
Payer: COMMERCIAL

## 2022-03-30 DIAGNOSIS — R26.89 BALANCE DISORDER: Primary | ICD-10-CM

## 2022-03-30 PROCEDURE — 97140 MANUAL THERAPY 1/> REGIONS: CPT | Performed by: PHYSICAL THERAPIST

## 2022-03-30 NOTE — PROGRESS NOTES
Daily Note     Today's date: 3/30/2022  Patient name: Ion Venegas  : 1955  MRN: [id number]  Referring provider: Shu Reese MD  Dx:   Encounter Diagnosis     ICD-10-CM    1  Balance disorder  R26 89                   Subjective: pt reports slight fatigue after her LV but has no other complaints  Objective: See treatment diary below      Assessment: SPO2 prior to tx 92%  Dropped at lowest to 85% after sit to stands but rebounded to 90% after 1 min rest  Requires frequent rest breaks but otherwise has good tolerance to the progression  Continue to progress as ziggy NV  Plan: Continue per plan of care  Precautions: CVA, chronic knee pain, chronic lumbar pain, B/L ELICEO, Monitor pulse-ox reading t/o tx          Manuals 3/24 3/30                                                               Neuro Re-Ed             FT EC 3x10"            FT EC foam 3x10" 1'x2           sls             Alt step taps 6" 20x Alt orange cone x20           sidestepping X 2 laps @ mirror Green tb 12ft x4           Tandem walking X 2 laps @ mirror 12ft x6           Alt hr/tr  x20           hurdles                          Ther Ex             Sit to stand 2x5 airex on chair x15           Step ups fwd/lat 10 x ea  x10ea           Hr/tr 2x10 ea x20           LAQ 2x10 (3")                                                                Ther Activity             bike x4'  5'                        Gait Training                                       Modalities

## 2022-03-31 ENCOUNTER — APPOINTMENT (OUTPATIENT)
Dept: PHYSICAL THERAPY | Facility: CLINIC | Age: 67
End: 2022-03-31
Payer: COMMERCIAL

## 2022-04-01 NOTE — TELEPHONE ENCOUNTER
Can you reach out to patient? Is she comfortable continuing with Pradaxa or does she want to switch to coumadin?

## 2022-04-01 NOTE — TELEPHONE ENCOUNTER
Patient called back in  I read Enma's message  She verbalizes understanding  I provided her the number to hematology for her to follow up, offered to forward this task on her behalf - she is appreciative

## 2022-04-04 NOTE — TELEPHONE ENCOUNTER
If patient switches to coumadin    Patient needs to go for baseline pt/inr prior to starting coumadin  She can start coumadin 5mg po daily and continue with pradaxa  Once coumadin therapeutic, she can discontinue Pradaxa  (we will know if she is therapeutic once pt/inr checked 5 days after starting coumadin )    Patient will need to be compliant with rountine PT/INR testing (usually weekly)  Patient will need to maintain consistent diet if she is on coumadin  If she is willing to have routine (weekly labs) and to follow diet limitations, she may switch to coumadin

## 2022-04-04 NOTE — TELEPHONE ENCOUNTER
Patient called in claiming she never heard from hematology  I reviewed this encounter with her but she is addiment that she had not spoken with anyone about coumadin  Patient states she does not wish to take pradaxa any longer and requested a call from hematology   [phone]

## 2022-04-05 DIAGNOSIS — Z79.01 ENCOUNTER FOR MONITORING COUMADIN THERAPY: ICD-10-CM

## 2022-04-05 DIAGNOSIS — Z51.81 ENCOUNTER FOR MONITORING COUMADIN THERAPY: ICD-10-CM

## 2022-04-05 DIAGNOSIS — Z86.711 HX OF PULMONARY EMBOLUS: ICD-10-CM

## 2022-04-05 DIAGNOSIS — I63.412 CEREBROVASCULAR ACCIDENT (CVA) DUE TO EMBOLISM OF LEFT MIDDLE CEREBRAL ARTERY (HCC): Primary | ICD-10-CM

## 2022-04-05 DIAGNOSIS — I63.412 CEREBROVASCULAR ACCIDENT (CVA) DUE TO EMBOLISM OF LEFT MIDDLE CEREBRAL ARTERY (HCC): ICD-10-CM

## 2022-04-05 RX ORDER — DABIGATRAN ETEXILATE 150 MG/1
150 CAPSULE, COATED PELLETS ORAL EVERY 12 HOURS SCHEDULED
Qty: 14 CAPSULE | Refills: 1 | Status: CANCELLED | OUTPATIENT
Start: 2022-04-05

## 2022-04-05 NOTE — TELEPHONE ENCOUNTER
Pt called back and I instructed her on each item below  Stressed importance of weekly lab work and consistently taking the Coumadin  Pt verbalized understanding and agrees with plan  Pt states she only has 4 Pradaxa pills left - one week supply pended for signature

## 2022-04-06 NOTE — TELEPHONE ENCOUNTER
Patient has not yet had pt/inr  This is so very important  PT/InR has to be monitored on Coumadin and we don't even have baseline as requested

## 2022-04-08 ENCOUNTER — TELEPHONE (OUTPATIENT)
Dept: HEMATOLOGY ONCOLOGY | Facility: CLINIC | Age: 67
End: 2022-04-08

## 2022-04-08 DIAGNOSIS — I63.412 CEREBROVASCULAR ACCIDENT (CVA) DUE TO EMBOLISM OF LEFT MIDDLE CEREBRAL ARTERY (HCC): ICD-10-CM

## 2022-04-08 DIAGNOSIS — Z86.711 HX OF PULMONARY EMBOLUS: ICD-10-CM

## 2022-04-08 RX ORDER — DABIGATRAN ETEXILATE 150 MG/1
150 CAPSULE, COATED PELLETS ORAL EVERY 12 HOURS SCHEDULED
Qty: 60 CAPSULE | Refills: 0 | Status: SHIPPED | OUTPATIENT
Start: 2022-04-08 | End: 2022-05-02

## 2022-04-08 NOTE — TELEPHONE ENCOUNTER
Medication Refill     Who is Calling  Patient   Medication  dabigatran etexilate (PRADAXA)     How many pills left    Preferred Pharmacy / Address  [facility]/pharmacy [address] [facility], [address] [facility]    Who is your Physician?  Sumaya Calhoun   Call back number [phone]   Relevant Information

## 2022-04-08 NOTE — TELEPHONE ENCOUNTER
Please arrange for F/U with Dr Dianne Kaur    I would like for her to have detailed discussion re: anticoagulant options

## 2022-04-08 NOTE — TELEPHONE ENCOUNTER
Spoke with pt who states she plans to have blood work completed Monday  She is requesting one month supply of Pradaxa because she has a coupon  States she knows she may not need it all since she plans to start Coumadin

## 2022-04-11 ENCOUNTER — TELEPHONE (OUTPATIENT)
Dept: HEMATOLOGY ONCOLOGY | Facility: CLINIC | Age: 67
End: 2022-04-11

## 2022-04-11 RX ORDER — WARFARIN SODIUM 5 MG/1
TABLET ORAL
Qty: 30 TABLET | Refills: 3 | OUTPATIENT
Start: 2022-04-11

## 2022-04-12 ENCOUNTER — APPOINTMENT (OUTPATIENT)
Dept: LAB | Facility: CLINIC | Age: 67
End: 2022-04-12
Payer: COMMERCIAL

## 2022-04-12 LAB
INR PPP: 1.49 (ref 0.84–1.19)
PROTHROMBIN TIME: 17.3 SECONDS (ref 11.6–14.5)

## 2022-04-12 PROCEDURE — 85610 PROTHROMBIN TIME: CPT

## 2022-04-12 PROCEDURE — 36415 COLL VENOUS BLD VENIPUNCTURE: CPT

## 2022-04-15 ENCOUNTER — TELEPHONE (OUTPATIENT)
Dept: HEMATOLOGY ONCOLOGY | Facility: CLINIC | Age: 67
End: 2022-04-15

## 2022-04-15 NOTE — TELEPHONE ENCOUNTER
Spoke with pt regarding her desired switch to Coumadin  Explained Narinder Zamarripa would like her to discuss with Dr Mando Barnard before prescribing Coumadin  Pt scheduled for 5/24

## 2022-04-22 ENCOUNTER — TELEPHONE (OUTPATIENT)
Dept: HEMATOLOGY ONCOLOGY | Facility: CLINIC | Age: 67
End: 2022-04-22

## 2022-04-27 ENCOUNTER — OFFICE VISIT (OUTPATIENT)
Dept: HEMATOLOGY ONCOLOGY | Facility: CLINIC | Age: 67
End: 2022-04-27
Payer: COMMERCIAL

## 2022-04-27 VITALS
TEMPERATURE: 98 F | DIASTOLIC BLOOD PRESSURE: 92 MMHG | BODY MASS INDEX: 35.79 KG/M2 | HEART RATE: 123 BPM | HEIGHT: 63 IN | RESPIRATION RATE: 17 BRPM | SYSTOLIC BLOOD PRESSURE: 130 MMHG | OXYGEN SATURATION: 90 % | WEIGHT: 202 LBS

## 2022-04-27 DIAGNOSIS — D68.52 PROTHROMBIN G20210A MUTATION (HCC): Primary | ICD-10-CM

## 2022-04-27 DIAGNOSIS — Q21.1 PFO (PATENT FORAMEN OVALE): ICD-10-CM

## 2022-04-27 PROBLEM — Q21.12 PFO (PATENT FORAMEN OVALE): Status: ACTIVE | Noted: 2022-04-27

## 2022-04-27 PROCEDURE — 3008F BODY MASS INDEX DOCD: CPT | Performed by: INTERNAL MEDICINE

## 2022-04-27 PROCEDURE — 1160F RVW MEDS BY RX/DR IN RCRD: CPT | Performed by: INTERNAL MEDICINE

## 2022-04-27 PROCEDURE — 3075F SYST BP GE 130 - 139MM HG: CPT | Performed by: INTERNAL MEDICINE

## 2022-04-27 PROCEDURE — 3080F DIAST BP >= 90 MM HG: CPT | Performed by: INTERNAL MEDICINE

## 2022-04-27 PROCEDURE — 1036F TOBACCO NON-USER: CPT | Performed by: INTERNAL MEDICINE

## 2022-04-27 PROCEDURE — 99214 OFFICE O/P EST MOD 30 MIN: CPT | Performed by: INTERNAL MEDICINE

## 2022-04-27 NOTE — PROGRESS NOTES
Hematology Outpatient Follow - Up Note  Mohan Carballo 79 y o  female MRN: @ Encounter: [id number]        Date:  4/27/2022        Assessment/ Plan:    1  History of DVT 2010 while actively smoking on OCPs treated with anticoagulation  2  History of PE 12/2015 after right hip replacement  3  Heterozygous for prothrombin gene mutation  4  S/P 4/2016 L hip replacement  Bridged from coumadin to Lovenox  IVC filter placed presurgery and removed 2 months later  Coumadin discontinued summer 2016         5  She was instructed to take ASA 81mg po daily since 2018  Patient reports non compliance        6  CVA 10/2021  She received TPA  BRAYDEN which showed PFO  started on Eliquis in setting of her hypercoagulable state and new stroke  Advised to take aspirin as well      7  Covid pneumonia 1/2022  Diffuse PE with right heart strain diagnosed 1/2022  No evidence of BLE clot on venous doppler  Eliquis discontinued  Pradaxa 150 mg p o  B i d rx  She reported compliance with Pradaxa     Continue Pradaxa 150 mg p o  b i d  for at least 9 months and the reduced dose ultimately to 75 mg p o  b i d  Follow-up in 9 months     CBC, iron studies and CMP     She had a history of ulcerative colitis    Follow-up with cardiology regarding PFO    Colonoscopy, instructed to stop Pradaxa 24-48 hours and resume within 6-12 hours after colonoscopy        Labs and imaging studies are reviewed by ordering provider once results are available  If there are findings that need immediate attention, you will be contacted when results available  Discussing results and the implication on your healthcare is best discussed in person at your follow-up visit  HPI:    Mohan Carballo is a 79 y o  seen 10/31/2018 at the referral of Ceci Rodas MD regarding heterozygous prothrombin gene mutation and history of DVT in 2010 and pulmonary embolism 12/2015 after she had a right hip replacement       2/2016 she met with Dr Mcallister   It was noted that she had a history or RLE DVT 5/2010 when she was on OCPs and smoking  Her mother has a history of DVT  Osvaldo Turner was tested at [facility] and was found to have prothrombin gene mutation, heterozygous  She was treated with Lovenox and then Coumadin and then discontinued      She underwent right total hip replacement in December 8533 complicated with small pulmonary embolus in the right lower lobe and was on coumadin  Recommendations at the 2/2016 office visit were that the "patient to continue Coumadin and prior to her elective date of left hip replacement I suggest strongly IVC filter by IR and bridged the patient with Lovenox 40 mg for 3 days and stop one day before the surgery and then resume Lovenox at 40 mg after the surgery and a bridge with Coumadin to keep INR between 2 and 3 for at least 3 more months  #6  IVC filter could be retrieved within 90 days after the surgery by IR "     4/13/2016 she had LEFT total hip arthoplasty  "Given her hx of a PE, an IVC filter was placed prior to her surgery that same day   Postoperatively she was started on coumadin with a lovenox bridge and sequential compression pumps as well as JAYA stockings were used for DVT prophylaxis"   6/2016 she had IVC filter removed  She completed 3 months of coumadin  States she was very happy when she was told she did not have to take any additional coumadin/"blood thinners " She used to take ASA 81mg po daily but has not in some time       At her 10/2018 appointment, we reviewed history, increased factors for clotting and options         Ultimately she was agreeable to take ASA 81mg po daily  Patient admitted 10/20-10/24/21 due to stroke like symptoms of aphasia, confusion, dysarthria and right upper extremity weakness  Patient received tPA and she also had thrombectomy with interval left M1 lysis by tPA and scattered distal emboli in left M4 segment  Patient had a BRAYDEN which showed PFO     Patient was started on Eliquis in setting of her hypercoagulable state and new stroke  Advised to take aspirin as well         Admitted 1/21 - 1/31/22 due to acute respiratory failure on hypoxia  She had covid exposure 12/2021  Her CT scan appearance was consistent with COVID pneumonia  She received dexamethasone and baricitinib, discharged home with the steroid taper with prednisone  She was discharged on oxygen      CTA 1/2022: Peripheral pulmonary emboli seen in several segmental and subsegmental branches in the left upper lobe as well as partially occlusive thrombus in the distal left main pulmonary artery extending into the left lower lobe segmental and subsegmental branches  1/24/22: BLE venous dopplers: No evidence of acute or chronic deep vein thrombosis      Future cardiology consult for consideration of PFO closure     Discharged home on Pradaxa 150 mg p o  B i d  Interval History:        Previous Treatment:         Test Results:    Imaging: No results found  Labs:   Lab Results   Component Value Date    WBC 9 89 01/31/2022    HGB 13 2 01/31/2022    HCT 39 6 01/31/2022    MCV 97 01/31/2022     01/31/2022     Lab Results   Component Value Date     12/31/2015    K 5 0 01/31/2022     01/31/2022    CO2 30 01/31/2022    ANIONGAP 8 12/31/2015    BUN 45 (H) 01/31/2022    CREATININE 1 06 01/31/2022    GLUCOSE 120 12/31/2015    GLUF 107 (H) 02/21/2020    CALCIUM 9 0 01/31/2022    CORRECTEDCA 9 5 10/21/2021    AST 34 01/21/2022    ALT 13 01/21/2022    ALKPHOS 71 01/21/2022    EGFR 54 01/31/2022       Lab Results   Component Value Date    FERRITIN 121 01/21/2022       Lab Results   Component Value Date    KOWRMJRY36 317 10/23/2021         ROS: Review of Systems   Constitutional: Negative for appetite change, chills, diaphoresis, fatigue and unexpected weight change  HENT:   Negative for mouth sores, nosebleeds, sore throat, trouble swallowing and voice change  Eyes: Negative for eye problems and icterus  Respiratory: Negative for chest tightness, cough, hemoptysis and wheezing  Cardiovascular: Negative for chest pain, leg swelling and palpitations  Gastrointestinal: Negative for abdominal distention, abdominal pain, blood in stool, constipation, diarrhea, nausea and vomiting  Endocrine: Negative for hot flashes  Genitourinary: Negative for bladder incontinence, difficulty urinating, dyspareunia, dysuria and frequency  Musculoskeletal: Positive for arthralgias  Negative for back pain, gait problem, neck pain and neck stiffness  Skin: Negative for itching and rash  Neurological: Negative for dizziness, gait problem, headaches, numbness, seizures and speech difficulty  Hematological: Negative for adenopathy  Bruises/bleeds easily  Psychiatric/Behavioral: Negative for decreased concentration, depression, sleep disturbance and suicidal ideas  The patient is not nervous/anxious  Current Medications: Reviewed  Allergies: Reviewed  PMH/FH/SH:  Reviewed      Physical Exam:    Body surface area is 1 94 meters squared  Wt Readings from Last 3 Encounters:   04/27/22 91 6 kg (202 lb)   03/03/22 90 3 kg (199 lb)   03/01/22 88 9 kg (195 lb 15 8 oz)        Temp Readings from Last 3 Encounters:   04/27/22 98 °F (36 7 °C)   03/03/22 97 8 °F (36 6 °C)   02/24/22 (!) 95 7 °F (35 4 °C) (Temporal)        BP Readings from Last 3 Encounters:   04/27/22 130/92   03/03/22 130/78   02/24/22 112/75         Pulse Readings from Last 3 Encounters:   04/27/22 (!) 123   03/03/22 105   02/24/22 (!) 140        Physical Exam  Vitals reviewed  Constitutional:       General: She is not in acute distress  Appearance: She is well-developed  She is not diaphoretic  HENT:      Head: Normocephalic and atraumatic  Eyes:      Conjunctiva/sclera: Conjunctivae normal    Neck:      Trachea: No tracheal deviation  Cardiovascular:      Rate and Rhythm: Normal rate  Rhythm irregular        Heart sounds: No murmur heard   No friction rub  No gallop  Pulmonary:      Effort: Pulmonary effort is normal  No respiratory distress  Breath sounds: Normal breath sounds  No wheezing or rales  Chest:      Chest wall: No tenderness  Abdominal:      General: There is no distension  Palpations: Abdomen is soft  Tenderness: There is no abdominal tenderness  Musculoskeletal:      Cervical back: Normal range of motion and neck supple  Right lower leg: Edema present  Left lower leg: Edema present  Lymphadenopathy:      Cervical: No cervical adenopathy  Skin:     General: Skin is warm and dry  Coloration: Skin is not pale  Findings: No erythema  Neurological:      Mental Status: She is alert and oriented to person, place, and time  Psychiatric:         Behavior: Behavior normal          Thought Content: Thought content normal          Judgment: Judgment normal          ECO  Goals and Barriers:  Current Goal: Minimize effects of disease  Barriers: None  Patient's Capacity to Self Care:  Patient is able to self care      Code Status: [unfilled]

## 2022-04-28 ENCOUNTER — TELEPHONE (OUTPATIENT)
Dept: FAMILY MEDICINE CLINIC | Facility: CLINIC | Age: 67
End: 2022-04-28

## 2022-04-28 ENCOUNTER — RA CDI HCC (OUTPATIENT)
Dept: OTHER | Facility: HOSPITAL | Age: 67
End: 2022-04-28

## 2022-04-28 NOTE — TELEPHONE ENCOUNTER
Patient currently has a UTI, she was wondering if you were able to send something to her pharmacy to get her through the week she is schedule with you for 5/4

## 2022-04-28 NOTE — PROGRESS NOTES
[facility] 75  coding opportunities     I11 0  F33 0 Major depressive disorder, recurrent, mild OR  F33 1 Major depressive disorder, recurrent, moderate OR  F33 8 Other recurrent depressive disorders OR  F33 9 Major depressive disorder, recurrent, unspecified        Chart Reviewed number of suggestions sent to Provider: 2     Patients Insurance     Medicare Insurance: [employer]

## 2022-05-02 PROBLEM — F33.9 MAJOR DEPRESSIVE DISORDER, RECURRENT, UNSPECIFIED (HCC): Status: ACTIVE | Noted: 2022-05-02

## 2022-05-04 ENCOUNTER — TELEPHONE (OUTPATIENT)
Dept: OTHER | Facility: OTHER | Age: 67
End: 2022-05-04

## 2022-05-04 NOTE — TELEPHONE ENCOUNTER
Appointment canceled by patient  Offered to reschedule, patient declined  Patient will call back to reschedule

## 2022-05-05 DIAGNOSIS — I63.412 CEREBROVASCULAR ACCIDENT (CVA) DUE TO EMBOLISM OF LEFT MIDDLE CEREBRAL ARTERY (HCC): ICD-10-CM

## 2022-05-05 RX ORDER — FLUOXETINE 10 MG/1
CAPSULE ORAL
Qty: 120 CAPSULE | Refills: 0 | Status: SHIPPED | OUTPATIENT
Start: 2022-05-05 | End: 2022-06-17

## 2022-05-10 ENCOUNTER — HOSPITAL ENCOUNTER (OUTPATIENT)
Dept: SLEEP CENTER | Facility: CLINIC | Age: 67
Discharge: HOME | End: 2022-05-10
Payer: COMMERCIAL

## 2022-05-10 DIAGNOSIS — I63.412 CEREBROVASCULAR ACCIDENT (CVA) DUE TO EMBOLISM OF LEFT MIDDLE CEREBRAL ARTERY (HCC): ICD-10-CM

## 2022-05-10 PROCEDURE — 95810 POLYSOM 6/> YRS 4/> PARAM: CPT | Performed by: INTERNAL MEDICINE

## 2022-05-10 PROCEDURE — 95810 POLYSOM 6/> YRS 4/> PARAM: CPT

## 2022-05-11 NOTE — PROGRESS NOTES
Sleep Study Documentation    Pre-Sleep Study       Sleep testing procedure explained to patient:YES    Patient napped prior to study:NO    Caffeine:Dayshift worker after 12PM   Caffeine use:YES- coffee  6 ounces    Alcohol:Dayshift workers after 5PM: Alcohol use:NO    Typical day for patient:YES       Study Documentation    Sleep Study Indications: Snoring, History of TIA/CVA    Sleep Study: Diagnostic   Snore: Moderate to loud  Supplemental O2: yes  O2 flow rate (L/min) range 1  O2 flow rate (L/min) final 2  O2 flow rate (L/min) range 1  O2 flow rate (L/min) final 2  Minimum SaO2 84%  Baseline SaO2 80%    EKG abnormalities: Yes  PVC, A-Fib? epochs 1209, 1210     EEG abnormalities: no    Sleep Study Recorded < 2 hours: N/A    Sleep Study Recorded > 2 hours but incomplete study: N/A    Sleep Study Recorded 6 hours but no sleep obtained: NO    Patient classification: retired       Post-Sleep Study    Medication used at bedtime or during sleep study:YES over the counter sleep aid and other prescription medications    Patient reports time it took to fall asleep:greater than 60 minutes    Patient reports waking up during study:3 or more times  Patient reports returning to sleep in 10 to 30 minutes  Patient reports sleeping 4 to 6 hours without dreaming  Patient reports sleep during study:better than usual    Patient rated sleepiness: Somewhat sleepy or tired    PAP treatment:no

## 2022-05-21 DIAGNOSIS — I63.412 CEREBROVASCULAR ACCIDENT (CVA) DUE TO EMBOLISM OF LEFT MIDDLE CEREBRAL ARTERY (HCC): ICD-10-CM

## 2022-05-23 RX ORDER — ATORVASTATIN CALCIUM 40 MG/1
TABLET, FILM COATED ORAL
Qty: 60 TABLET | Refills: 0 | Status: SHIPPED | OUTPATIENT
Start: 2022-05-23 | End: 2022-07-18

## 2022-05-26 ENCOUNTER — TELEPHONE (OUTPATIENT)
Dept: HEMATOLOGY ONCOLOGY | Facility: CLINIC | Age: 67
End: 2022-05-26

## 2022-05-26 NOTE — TELEPHONE ENCOUNTER
Spoke with Elinor Jenkins in GI and explained Saviric Luis Alberto is out of the office this week  Okay to follow up on this next week

## 2022-05-26 NOTE — TELEPHONE ENCOUNTER
Scar Carrasco is calling in from [employer] indicating that this message was sent on 02/03 and that Daniel Rodriguez did in fact respond but her response is no longer attached to the note  Would she please respond on this note and send it to [employer]               Our mutual patient is scheduled for procedure: Colonoscopy     On: 6/10/22     With: Dr Tamika Weston  She is taking the following blood thinner: Pradaxa     Can this be stopped __2____ days prior to the procedure?       Physician Approving clearance: ________________________

## 2022-05-27 DIAGNOSIS — N39.0 URINARY TRACT INFECTION WITHOUT HEMATURIA, SITE UNSPECIFIED: Primary | ICD-10-CM

## 2022-05-27 RX ORDER — NITROFURANTOIN 25; 75 MG/1; MG/1
100 CAPSULE ORAL 2 TIMES DAILY
Qty: 10 CAPSULE | Refills: 0 | Status: SHIPPED | OUTPATIENT
Start: 2022-05-27 | End: 2022-06-01

## 2022-05-31 ENCOUNTER — TELEPHONE (OUTPATIENT)
Dept: SLEEP CENTER | Facility: CLINIC | Age: 67
End: 2022-05-31

## 2022-05-31 NOTE — TELEPHONE ENCOUNTER
Left voice message for patient  Advised sleep study resulted and does not reveal CLEM or PLM  She can follow up with her PCP or of she woud like to review results with a sleep specialist, she can call office to schedule consult  Phone number provided

## 2022-06-02 NOTE — TELEPHONE ENCOUNTER
Love Suazo is calling in from [place] indicating that this message was sent on 02/03 and that Johnson Elizabeth did in fact respond but her response is no longer attached to the note  Would she please respond on this note and send it to [place]       See previous note

## 2022-06-07 ENCOUNTER — TELEPHONE (OUTPATIENT)
Dept: HEMATOLOGY ONCOLOGY | Facility: CLINIC | Age: 67
End: 2022-06-07

## 2022-06-07 ENCOUNTER — TELEPHONE (OUTPATIENT)
Dept: GASTROENTEROLOGY | Facility: MEDICAL CENTER | Age: 67
End: 2022-06-07

## 2022-06-07 NOTE — TELEPHONE ENCOUNTER
Gray Gonzalez is calling in from [place] indicating that this message was sent on 02/03 and that Owegogeorge Bhandari did in fact respond but her response is no longer attached to the note  Would she please respond on this note and send it to [facility]     See previous note from 05/26    She indicates that they will need an answer by today or surgery will need to be rescheduled       Gray Gonzalez can be reached back at [phone]

## 2022-06-07 NOTE — PROGRESS NOTES
Patient called asking for a zpak  She is very short of breath and believes she has covid  I placed order to be tested for covid and explained to her to rest and staty hydrated but if she is so short of breath she should go to ED  Yes

## 2022-06-07 NOTE — TELEPHONE ENCOUNTER
Appointment Request    Sanakenya Patterson JERI  Patient Appointment Schedule Request Pool 14 hours ago (6:27 PM)   Appointment Request From: Kevin Lee     With Provider: MD Felisha Solis [facility]     Preferred Date Range: 6/10/2022 - 6/10/2022     Preferred Times: Any Time     Reason for visit: Gastroenterology Office Visit     Comments:  I was supposed to have a colonoscopy done this Friday 06-10-22, I haven't received any notifications that this is still scheduled, I will call tomorrow to confirm one way or another        lvm to inform yes she is still on the schedule for her procedure on Friday 6/10 and she will receive a call on Thursday for her arrival time

## 2022-06-08 ENCOUNTER — OFFICE VISIT (OUTPATIENT)
Dept: CARDIOLOGY CLINIC | Facility: CLINIC | Age: 67
End: 2022-06-08
Payer: COMMERCIAL

## 2022-06-08 VITALS
BODY MASS INDEX: 35.26 KG/M2 | WEIGHT: 199 LBS | HEART RATE: 89 BPM | SYSTOLIC BLOOD PRESSURE: 114 MMHG | DIASTOLIC BLOOD PRESSURE: 84 MMHG | HEIGHT: 63 IN

## 2022-06-08 DIAGNOSIS — Z01.818 PRE-OP EXAMINATION: ICD-10-CM

## 2022-06-08 DIAGNOSIS — G47.33 OSA (OBSTRUCTIVE SLEEP APNEA): ICD-10-CM

## 2022-06-08 DIAGNOSIS — I10 BENIGN ESSENTIAL HYPERTENSION: Primary | ICD-10-CM

## 2022-06-08 DIAGNOSIS — Q21.1 PFO (PATENT FORAMEN OVALE): ICD-10-CM

## 2022-06-08 PROCEDURE — 3008F BODY MASS INDEX DOCD: CPT | Performed by: INTERNAL MEDICINE

## 2022-06-08 PROCEDURE — 1160F RVW MEDS BY RX/DR IN RCRD: CPT | Performed by: INTERNAL MEDICINE

## 2022-06-08 PROCEDURE — 93000 ELECTROCARDIOGRAM COMPLETE: CPT | Performed by: INTERNAL MEDICINE

## 2022-06-08 PROCEDURE — 3074F SYST BP LT 130 MM HG: CPT | Performed by: INTERNAL MEDICINE

## 2022-06-08 PROCEDURE — 3079F DIAST BP 80-89 MM HG: CPT | Performed by: INTERNAL MEDICINE

## 2022-06-08 PROCEDURE — 99204 OFFICE O/P NEW MOD 45 MIN: CPT | Performed by: INTERNAL MEDICINE

## 2022-06-08 PROCEDURE — 1036F TOBACCO NON-USER: CPT | Performed by: INTERNAL MEDICINE

## 2022-06-08 NOTE — PROGRESS NOTES
Outpatient Consultation - General Cardiology   Aleta Chester 79 y o  female   MRN: [id number]  Encounter: [id number]      PCP: Sherrie Torres MD      History of Present Illness   Physician Requesting Consult: Consults   Reason for Consult / Principal Problem: preprocedural evaluation    HPI: Aleta Chester is a 79y o  year old female former smoker, with prothrombin gene mutation, recurrent strokes, PE, and DVT who presents prior to planned colonoscopy on Friday  She reports no ongoing symptoms of chest pain or palpitations  She has baseline shortness of breath with ambulating short distances outside but is able to do household tasks and walk around her level home without any difficulty  She is trying to use the stationary bike but is not able to mainly due to running out of her home oxygen recently  She was diagnosed with PFO in 2021 by BRAYDEN after admission for stroke  She does not report any bleeding problems, and is taking her dabigatran + ASA regularly  Review of Systems  Review of system was conducted and was negative except for as stated in the HPI  Historical Information   Past Medical History:   Diagnosis Date    Acid reflux     Anxiety     Arthritis     Back pain     Carpal tunnel syndrome, bilateral     Depression     Endometrial cancer ([facility] [address] ) 1997    Hiatal hernia     History of DVT of lower extremity     Right leg-had IVc filter  removal today-6/6/2016    History of pulmonary embolism     History of uterine cancer     Was stage 3- uterine, ovaries-hysterectomy    Hypercholesteremia     Hypertension     Left leg pain     Lumbar herniated disc     Tumor cells, benign     In right atrium   Probably congenital     Varicose vein of leg     Wears glasses      Past Surgical History:   Procedure Laterality Date    COLONOSCOPY      DENTAL SURGERY      DILATION AND CURETTAGE OF UTERUS      ESOPHAGOGASTRODUODENOSCOPY      FOOT SURGERY      LAST ASSESSED: [id number]    HYSTERECTOMY     uterine cancer-hx    IR STROKE ALERT  10/20/2021    JOINT REPLACEMENT      b/l   THR    OOPHORECTOMY Bilateral     OK COLONOSCOPY FLX DX W/COLLJ SPEC WHEN PFRMD N/A 2017    Procedure: EGD AND COLONOSCOPY;  Surgeon: Ni Martines MD;  [facility];   Service: Gastroenterology    OK TOTAL HIP ARTHROPLASTY Left 2016    Procedure: ARTHROPLASTY HIP TOTAL;  Surgeon: Rizwana Florez DO;  [facility];  Service: Orthopedics    TONSILLECTOMY AND ADENOIDECTOMY      TOTAL HIP ARTHROPLASTY Right     LAST ASSESSED: [id number]     Social History     Substance and Sexual Activity   Alcohol Use Yes    Comment: 2 or 3 monthly     Social History     Substance and Sexual Activity   Drug Use No     Social History     Tobacco Use   Smoking Status Former Smoker    Packs/day: 0 50    Years: 50 00    Pack years: 25 00    Types: Cigarettes    Quit date: 2022    Years since quittin 3   Smokeless Tobacco Never Used     Family History: non-contributory    Meds/Allergies   Home Medications:   Current Outpatient Medications:     atorvastatin (LIPITOR) 40 mg tablet, TAKE 1 TABLET BY MOUTH EVERY DAY IN THE EVENING, Disp: 60 tablet, Rfl: 0    FLUoxetine (PROzac) 10 mg capsule, TAKE 3 CAPSULES BY MOUTH EVERY DAY, Disp: 120 capsule, Rfl: 0    FLUoxetine (PROzac) 20 mg capsule, Take 1 capsule (20 mg total) by mouth daily, Disp: 90 capsule, Rfl: 3    gabapentin (NEURONTIN) 300 mg capsule, Take 300 mg by mouth 3 (three) times a day, Disp: , Rfl:     pantoprazole (PROTONIX) 20 mg tablet, TAKE 1 TABLET BY MOUTH EVERY DAY, Disp: 90 tablet, Rfl: 1    Pradaxa 150 MG capsu, TAKE 1 CAPSULE (150 MG TOTAL) BY MOUTH EVERY 12 (TWELVE) HOURS, Disp: 60 capsule, Rfl: 6    acetaminophen (TYLENOL) 325 mg tablet, Take 2 tablets (650 mg total) by mouth every 6 (six) hours as needed for fever (Patient not taking: Reported on 2022), Disp: , Rfl: 0    aspirin (ECOTRIN LOW STRENGTH) 81 mg EC tablet, Take 1 tablet (81 mg total) by mouth daily, Disp: 60 tablet, Rfl: 0    benzonatate (TESSALON PERLES) 100 mg capsule, Take 1 capsule (100 mg total) by mouth 3 (three) times a day as needed for cough (Patient not taking: Reported on 6/8/2022), Disp: 20 capsule, Rfl: 0    cyclobenzaprine (FLEXERIL) 5 mg tablet, Take 1 tablet (5 mg total) by mouth 3 (three) times a day as needed for muscle spasms (Patient not taking: Reported on 6/8/2022), Disp: 20 tablet, Rfl: 0    Allergies   Allergen Reactions    Percocet [Oxycodone-Acetaminophen] Hives    Amlodipine     Percocet  [Oxycodone-Acetaminophen]          Objective   Vitals: Blood pressure 114/84, pulse 89, height 5' 3" (1 6 m), weight 90 3 kg (199 lb)  Physical Exam  GEN: Kenzie Peterson appears well, alert and oriented x 3, pleasant and cooperative   HEENT:  Normocephalic, atraumatic, anicteric, moist mucous membranes  NECK: No JVD or carotid bruits   HEART: regular rate and rhythm, no murmurs  LUNGS: Clear to auscultation bilaterally; no wheezes, rales, or rhonchi; respiration nonlabored   ABDOMEN:  Normoactive bowel sounds, soft, no tenderness, no distention  EXTREMITIES: peripheral pulses palpable; no edema   SKIN:  Dry, intact, warm to touch    Lab Results: I have personally reviewed pertinent lab results      Lab Results   Component Value Date    HSTNI0 80 01/21/2022    HSTNI2 99 01/21/2022    HSTNI4 81 01/21/2022     Lab Results   Component Value Date    NTBNP 9,960 (H) 01/21/2022     Lab Results   Component Value Date    CHOL 166 12/18/2015    TRIG 254 (H) 10/21/2021    HDL 37 (L) 10/21/2021    LDLCALC 26 10/21/2021     Lab Results   Component Value Date     12/31/2015    K 5 0 01/31/2022    CO2 30 01/31/2022     01/31/2022    BUN 45 (H) 01/31/2022    CREATININE 1 06 01/31/2022    ALT 13 01/21/2022    AST 34 01/21/2022     Lab Results   Component Value Date    WBC 9 89 01/31/2022    HGB 13 2 01/31/2022    HCT 39 6 01/31/2022    MCV 97 01/31/2022  01/31/2022     Lab Results   Component Value Date    INR 1 49 (H) 04/12/2022    INR 2 89 (H) 01/21/2022    INR 1 01 10/20/2021         Imaging: I have personally reviewed pertinent reports  EKG:   Date: 6/8  Interpretation: Sinus arrythmia, normal rate, TWI in inferior/anterior precordial leads    BRAYDEN:  No results found for this or any previous visit  Results for orders placed during the hospital encounter of 10/20/21    BRAYDEN    Interpretation Summary    Left Ventricle: Left ventricular cavity size is normal  The left ventricular ejection fraction is 60%  Systolic function is normal  Wall motion is normal  Wall thickness is normal     Right Ventricle: Right ventricular cavity size is normal  Systolic function is normal     Atrial Septum: There is a small patent foramen ovale confirmed at rest with right to left shunting by saline contrast     Left Atrial Appendage: There is normal function  There is no thrombus  Left atrial appendage is small in size    Aortic Valve: The aortic valve is trileaflet  The leaflets are ltixwg-sk-wdszydiqtp thickened  The leaflets are ekomcd-um-dhdpgycxat calcified  The leaflets exhibit normal mobility    Mitral Valve: There is trace regurgitation    Tricuspid Valve: There is mild regurgitation  Patient had difficulty oxygenating during procedure likely due to undiagnosed obstructive sleep apnea  CMR:  No results found for this or any previous visit  No results found for this or any previous visit  Results for orders placed in visit on 12/16/15    MRI cardiac wo contrast    Narrative  EXAM ROOM = MRI ROOM 1  EXAM START = [id number]  EXAM STOP = [id number]  FILMS USED = N/A    69-year-old woman for evaluation of extracardiac mass  Technique-  1  3 plane SSFP localizers  2  SSFP cine imaging in long and short axis planes  3  The patient tolerated the procedure well without complication      Measurements-  Cornelio-septal wall 8 mm  Postero-lateral wall 6 mm  Left ventricular end-diastolic dimension 45 mm  Left ventricular end-systolic dimension 36 mm  Left ventricular end-diastolic volume 348 ml  Left ventricular end-systolic volume  47 ml  Stroke volume 56 ml  Cardiac Output 9 2 L/min  Ejection fraction 54 %    Findings-  1  Very limited study  Patient terminated study very early due to back  and hip pain  2  The left ventricle is normal in size with normal wall thickness  Left ventricular systolic function is normal with a measured ejection  fraction of 54%  There are no regional left ventricular wall motion  abnormalities  3  The right ventricle is normal in size with normal right ventricular  systolic function  4  There is a 29 x 32 mm extracardiac spherical mass impinging on the  free wall of the right atrium  This appears to be noncomplex in nature  Impression-  1  Very limited study  Patient terminated study very early due to back  and hip pain  2  Normal left and right ventricular size and systolic function  3  Spherical extracardiac mass impinging on the free wall the right  atrium  This has characteristics of a benign mass, likely fatty or  cystic in origin  However  necessary imaging sequences were not  performed due to early termination of the study  A repeat cardiac MRI  with anesthesia is strongly recommended for complete study            Transcribed on- [id number]    SHAYNE Childs MD  Reading Radiologist- SHAYNE Penny MD  Electronically Signed- SHAYNE Penny MD  Released Date Time- 12/22/15 0751  ------------------------------------------------------------------------------  READ BY = [id number]^DIDI QUINTERO  RELEASED BY = 87068^DIDI QUINTERO        Assessment/Plan     Preoperative Evaluation for Diagnostic Colonoscopy under general anesthesia    Patent Foramen Ovale    Prothrombin gene mutation    Recurrent stroke    History of DVT/PE    History of atrial lipoma    Mild Group 3 Pulmonary Hypertension    67F with known hypercoagulability with multiple prior thrombotic events presents for evaluation for colonoscopy  She has stable symptoms of dyspnea with more than usual level of activity but no active chest pain or arrythmias  Last TTE in 1/2022 demonstrated normal LV function, mild RV dysfunction and mildly elevated PA pressures  No significant valvular stenosis was present  She has a known PFO diagnosed on BRAYDEN for stroke  Given that her major risk factor for stroke is hypercoagulability, there is no plan for PFO closure at this time, and the risks likely outweigh the benefits  Her atrial lipoma is stable  From a cardiac standpoint she is low risk to proceed with colonoscopy without need for any further testing  She can hold Dabigatran 1-2 days prior to the colonoscopy and resume as soon as possible afterwards  She can continue ASA after her procedure for added stroke prevention       Jerald David MD  Cardiology Fellow

## 2022-06-09 ENCOUNTER — TELEPHONE (OUTPATIENT)
Dept: OTHER | Facility: OTHER | Age: 67
End: 2022-06-09

## 2022-06-09 NOTE — TELEPHONE ENCOUNTER
Pt called in stating she never received her Rx's for her colonoscopy prep and she is scheduled tomorrow  Pt is to do the Miralax/Dolculax Prep  Pt made aware that this is all OTC  Dosages and instructions for prep were reviewed with pt  Pt Verbalized understanding

## 2022-06-10 ENCOUNTER — ANESTHESIA EVENT (OUTPATIENT)
Dept: GASTROENTEROLOGY | Facility: HOSPITAL | Age: 67
End: 2022-06-10

## 2022-06-10 ENCOUNTER — ANESTHESIA (OUTPATIENT)
Dept: GASTROENTEROLOGY | Facility: HOSPITAL | Age: 67
End: 2022-06-10

## 2022-06-10 ENCOUNTER — HOSPITAL ENCOUNTER (OUTPATIENT)
Dept: GASTROENTEROLOGY | Facility: HOSPITAL | Age: 67
Setting detail: OUTPATIENT SURGERY
Discharge: HOME | End: 2022-06-10
Attending: INTERNAL MEDICINE
Payer: COMMERCIAL

## 2022-06-10 VITALS
RESPIRATION RATE: 18 BRPM | TEMPERATURE: 97.7 F | WEIGHT: 199 LBS | HEIGHT: 63 IN | DIASTOLIC BLOOD PRESSURE: 104 MMHG | OXYGEN SATURATION: 90 % | BODY MASS INDEX: 35.26 KG/M2 | SYSTOLIC BLOOD PRESSURE: 155 MMHG | HEART RATE: 110 BPM

## 2022-06-10 DIAGNOSIS — R19.5 POSITIVE COLORECTAL CANCER SCREENING USING COLOGUARD TEST: ICD-10-CM

## 2022-06-10 DIAGNOSIS — Z86.010 HISTORY OF COLON POLYPS: ICD-10-CM

## 2022-06-10 PROCEDURE — 45378 DIAGNOSTIC COLONOSCOPY: CPT | Performed by: INTERNAL MEDICINE

## 2022-06-10 RX ORDER — SODIUM CHLORIDE 9 MG/ML
INJECTION, SOLUTION INTRAVENOUS CONTINUOUS PRN
Status: DISCONTINUED | OUTPATIENT
Start: 2022-06-10 | End: 2022-06-10

## 2022-06-10 RX ORDER — KETAMINE HYDROCHLORIDE 50 MG/ML
INJECTION, SOLUTION, CONCENTRATE INTRAMUSCULAR; INTRAVENOUS AS NEEDED
Status: DISCONTINUED | OUTPATIENT
Start: 2022-06-10 | End: 2022-06-10

## 2022-06-10 RX ORDER — MIDAZOLAM HYDROCHLORIDE 2 MG/2ML
INJECTION, SOLUTION INTRAMUSCULAR; INTRAVENOUS AS NEEDED
Status: DISCONTINUED | OUTPATIENT
Start: 2022-06-10 | End: 2022-06-10

## 2022-06-10 RX ORDER — PROPOFOL 10 MG/ML
INJECTION, EMULSION INTRAVENOUS AS NEEDED
Status: DISCONTINUED | OUTPATIENT
Start: 2022-06-10 | End: 2022-06-10

## 2022-06-10 RX ADMIN — SODIUM CHLORIDE: 0.9 INJECTION, SOLUTION INTRAVENOUS at 09:17

## 2022-06-10 RX ADMIN — PROPOFOL 20 MG: 10 INJECTION, EMULSION INTRAVENOUS at 09:36

## 2022-06-10 RX ADMIN — KETAMINE HYDROCHLORIDE 30 MG: 50 INJECTION, SOLUTION INTRAMUSCULAR; INTRAVENOUS at 09:42

## 2022-06-10 RX ADMIN — KETAMINE HYDROCHLORIDE 20 MG: 50 INJECTION, SOLUTION INTRAMUSCULAR; INTRAVENOUS at 09:30

## 2022-06-10 RX ADMIN — PROPOFOL 20 MG: 10 INJECTION, EMULSION INTRAVENOUS at 09:47

## 2022-06-10 RX ADMIN — MIDAZOLAM 2 MG: 1 INJECTION INTRAMUSCULAR; INTRAVENOUS at 09:28

## 2022-06-10 RX ADMIN — PROPOFOL 50 MG: 10 INJECTION, EMULSION INTRAVENOUS at 09:22

## 2022-06-10 NOTE — ANESTHESIA POSTPROCEDURE EVALUATION
Post-Op Assessment Note    CV Status:  Stable    Pain management: adequate     Mental Status:  Alert and awake   Hydration Status:  Euvolemic   PONV Controlled:  Controlled   Airway Patency:  Patent      Post Op Vitals Reviewed: Yes      Staff: CRNA         No complications documented      /85 (06/10/22 1001)    Temp 97 7 °F (36 5 °C) (06/10/22 1001)    Pulse 104 (06/10/22 1001)   Resp 18 (06/10/22 1001)    SpO2 95 % (06/10/22 1001)

## 2022-06-10 NOTE — ANESTHESIA PREPROCEDURE EVALUATION
Procedure:  COLONOSCOPY    Relevant Problems   CARDIO   (+) Benign essential hypertension   (+) Hyperlipidemia      GI/HEPATIC   (+) Gastroesophageal reflux disease without esophagitis      HEMATOLOGY   (+) Hypercoagulable state (HCC)      MUSCULOSKELETAL   (+) Hip arthritis   (+) Osteoarthritis of left hip      NEURO/PSYCH   (+) Depressive disorder   (+) History of CVA (cerebrovascular accident)   (+) Hx of pulmonary embolus   (+) Major depressive disorder, recurrent, unspecified (HCC)      PULMONARY   (+) CLEM (obstructive sleep apnea)   (+) Shortness of breath   b/l PE in Jan 2022, mild pulm htn     Physical Exam    Airway    Mallampati score: II  TM Distance: >3 FB  Neck ROM: full     Dental       Cardiovascular  Rate: normal,     Pulmonary  Breath sounds clear to auscultation,     Other Findings        Anesthesia Plan  ASA Score- 3     Anesthesia Type- IV sedation with anesthesia with ASA Monitors  Additional Monitors:   Airway Plan:           Plan Factors-    Chart reviewed  EKG reviewed  Existing labs reviewed  Patient summary reviewed  Induction- intravenous  Postoperative Plan-     Informed Consent- Anesthetic plan and risks discussed with patient  I personally reviewed this patient with the CRNA  Discussed and agreed on the Anesthesia Plan with the CRNA  Rg Tapia

## 2022-06-10 NOTE — H&P
[facility] - History & Physical  Margo Donohue 79 y o  female MRN: [id number]      HPI: Margo Donohue is a 79y o  year old female who presents for colonoscopy for positive Cologuard, possible history of colitis  Last colonoscopy was in 2017  History of CVA and prothrombin mutation on Pradaxa which was held for 2 days  REVIEW OF SYSTEMS: Per the HPI, and otherwise unremarkable  Historical Information   Past Medical History:   Diagnosis Date    Acid reflux     Anxiety     Arthritis     Back pain     Carpal tunnel syndrome, bilateral     Depression     Endometrial cancer ([address] ) 1997    Hiatal hernia     History of DVT of lower extremity     Right leg-had IVc filter  removal today-6/6/2016    History of pulmonary embolism     History of uterine cancer     Was stage 3- uterine, ovaries-hysterectomy    Hypercholesteremia     Hypertension     Left leg pain     Lumbar herniated disc     Tumor cells, benign     In right atrium  Probably congenital     Varicose vein of leg     Wears glasses      Past Surgical History:   Procedure Laterality Date    COLONOSCOPY      DENTAL SURGERY      DILATION AND CURETTAGE OF UTERUS      ESOPHAGOGASTRODUODENOSCOPY      FOOT SURGERY      LAST ASSESSED: [id number]    HYSTERECTOMY  1997    uterine cancer-hx    IR STROKE ALERT  10/20/2021    JOINT REPLACEMENT      b/l   THR    OOPHORECTOMY Bilateral 1997    IA COLONOSCOPY FLX DX W/COLLJ SPEC WHEN PFRMD N/A 6/14/2017    Procedure: EGD AND COLONOSCOPY;  Surgeon: Lord Kandi MD;  [facility];   Service: Gastroenterology    IA TOTAL HIP ARTHROPLASTY Left 4/13/2016    Procedure: ARTHROPLASTY HIP TOTAL;  Surgeon: Nate Last DO;  [facility];  Service: Orthopedics    TONSILLECTOMY AND ADENOIDECTOMY      TOTAL HIP ARTHROPLASTY Right     LAST ASSESSED: [id number]     Social History   Social History     Substance and Sexual Activity   Alcohol Use Yes    Comment: 2 or 3 monthly     Social History     Substance and Sexual Activity   Drug Use No     Social History     Tobacco Use   Smoking Status Former Smoker    Packs/day: 0 50    Years: 50 00    Pack years: 25 00    Types: Cigarettes    Quit date: 2022    Years since quittin 3   Smokeless Tobacco Never Used     Family History   Problem Relation Age of Onset    Heart failure Father     Colon cancer Father 62    Arthritis Family     No Known Problems Sister     No Known Problems Daughter     No Known Problems Maternal Grandmother     No Known Problems Maternal Grandfather     No Known Problems Paternal Grandmother     No Known Problems Paternal Grandfather     No Known Problems Sister     No Known Problems Maternal Aunt     No Known Problems Paternal Aunt        MEDICATIONS & ALLERGIES:    Current Outpatient Medications   Medication Instructions    acetaminophen (TYLENOL) 650 mg, Oral, Every 6 hours PRN    atorvastatin (LIPITOR) 40 mg tablet TAKE 1 TABLET BY MOUTH EVERY DAY IN THE EVENING    cyclobenzaprine (FLEXERIL) 5 mg, Oral, 3 times daily PRN    FLUoxetine (PROzac) 10 mg capsule TAKE 3 CAPSULES BY MOUTH EVERY DAY    FLUoxetine (PROZAC) 20 mg, Oral, Daily    gabapentin (NEURONTIN) 300 mg, Oral, 3 times daily    pantoprazole (PROTONIX) 20 mg tablet TAKE 1 TABLET BY MOUTH EVERY DAY    Pradaxa 150 MG capsu TAKE 1 CAPSULE (150 MG TOTAL) BY MOUTH EVERY 12 (TWELVE) HOURS     Allergies   Allergen Reactions    Percocet [Oxycodone-Acetaminophen] Hives    Amlodipine     Percocet  [Oxycodone-Acetaminophen]        PHYSICAL EXAM:    Objective   Blood pressure (!) 165/101, pulse 102, temperature 99 3 °F (37 4 °C), temperature source Tympanic, resp  rate 18, height 5' 3" (1 6 m), weight 90 3 kg (199 lb), SpO2 95 %  Body mass index is 35 25 kg/m²      Gen: NAD  CV: RRR  CHEST: Clear  ABD: Soft, NT/ND  EXT: No edema    ASSESSMENT AND PLAN:  This is a 79y o  year old female here for colonoscopy, and she is stable and optimized for her procedure  JENNIFER Maldonado  Chief Gastroenterology Fellow  [facility]  Available on [email or web address]

## 2022-06-17 ENCOUNTER — TELEPHONE (OUTPATIENT)
Dept: GASTROENTEROLOGY | Facility: MEDICAL CENTER | Age: 67
End: 2022-06-17

## 2022-06-17 DIAGNOSIS — I63.412 CEREBROVASCULAR ACCIDENT (CVA) DUE TO EMBOLISM OF LEFT MIDDLE CEREBRAL ARTERY (HCC): ICD-10-CM

## 2022-06-17 DIAGNOSIS — R19.5 POSITIVE COLORECTAL CANCER SCREENING USING COLOGUARD TEST: ICD-10-CM

## 2022-06-17 DIAGNOSIS — Z86.010 HISTORY OF COLON POLYPS: Primary | ICD-10-CM

## 2022-06-17 RX ORDER — FLUOXETINE 10 MG/1
CAPSULE ORAL
Qty: 120 CAPSULE | Refills: 0 | Status: SHIPPED | OUTPATIENT
Start: 2022-06-17

## 2022-06-17 NOTE — TELEPHONE ENCOUNTER
Colonoscopy could not be completed due to severe diverticulosis and luminal narrowing  She needs CT colonography

## 2022-06-22 ENCOUNTER — TELEPHONE (OUTPATIENT)
Dept: FAMILY MEDICINE CLINIC | Facility: CLINIC | Age: 67
End: 2022-06-22

## 2022-06-22 DIAGNOSIS — I63.412 CEREBROVASCULAR ACCIDENT (CVA) DUE TO EMBOLISM OF LEFT MIDDLE CEREBRAL ARTERY (HCC): ICD-10-CM

## 2022-06-22 DIAGNOSIS — F32.A DEPRESSION, UNSPECIFIED DEPRESSION TYPE: Primary | ICD-10-CM

## 2022-06-22 DIAGNOSIS — M48.061 SPINAL STENOSIS OF LUMBAR REGION, UNSPECIFIED WHETHER NEUROGENIC CLAUDICATION PRESENT: ICD-10-CM

## 2022-06-22 NOTE — TELEPHONE ENCOUNTER
Patient called stating that there was an error with the direction on her medication FLUoxetine (PROzac) 10 mg capsule  The patient is currently taking 20 mg and also 10 mg once daily  Her 10 mg prescription directions says to take 3 tablets daily which is incorrect  The pharmacy is requesting a new script for her medication with the correct directions on it: Take 1 tablet by mouth daily  This is for a Dr Marla Cooney patient   Please advise

## 2022-06-23 RX ORDER — FLUOXETINE HYDROCHLORIDE 20 MG/1
CAPSULE ORAL
Qty: 90 CAPSULE | Refills: 1 | Status: SHIPPED | OUTPATIENT
Start: 2022-06-23

## 2022-06-23 RX ORDER — FLUOXETINE 10 MG/1
CAPSULE ORAL
Qty: 90 CAPSULE | Refills: 1 | Status: SHIPPED | OUTPATIENT
Start: 2022-06-23

## 2022-06-23 RX ORDER — FLUOXETINE HYDROCHLORIDE 20 MG/1
20 CAPSULE ORAL DAILY
Qty: 90 CAPSULE | Refills: 3 | Status: SHIPPED | OUTPATIENT
Start: 2022-06-23 | End: 2022-06-23 | Stop reason: SDUPTHER

## 2022-06-29 ENCOUNTER — TELEPHONE (OUTPATIENT)
Dept: OTHER | Facility: OTHER | Age: 67
End: 2022-06-29

## 2022-06-29 DIAGNOSIS — Z86.010 HX OF COLONIC POLYP: Primary | ICD-10-CM

## 2022-06-29 NOTE — TELEPHONE ENCOUNTER
Patient called in post attempt to schedule the CT scan ordered by [facility] on 6/17/22  Patient was told the order should read "without contrast" so she was unable to schedule  Please change the order and follow up with patient to schedule

## 2022-06-30 ENCOUNTER — TELEPHONE (OUTPATIENT)
Dept: FAMILY MEDICINE CLINIC | Facility: CLINIC | Age: 67
End: 2022-06-30

## 2022-06-30 NOTE — TELEPHONE ENCOUNTER
Please call her for her routine follow-up with Dr Nancy Rushing along with her Medicare wellness    Thank you

## 2022-07-18 DIAGNOSIS — I63.412 CEREBROVASCULAR ACCIDENT (CVA) DUE TO EMBOLISM OF LEFT MIDDLE CEREBRAL ARTERY (HCC): ICD-10-CM

## 2022-07-18 RX ORDER — ATORVASTATIN CALCIUM 40 MG/1
TABLET, FILM COATED ORAL
Qty: 90 TABLET | Refills: 1 | Status: SHIPPED | OUTPATIENT
Start: 2022-07-18

## 2022-07-22 ENCOUNTER — TELEPHONE (OUTPATIENT)
Dept: GASTROENTEROLOGY | Facility: CLINIC | Age: 67
End: 2022-07-22

## 2022-07-22 NOTE — TELEPHONE ENCOUNTER
Patients GI provider:  Dr Lee Ann Kumari    Number to return call: [phone]    Reason for call: Tony Brito calling from [address] in regards to pt's script for CT colonoscopy diagnostic without contrast  She states she did reach out to Dr Lee Ann Kumari but Dr Lee Ann Kumari [place] and per Dr Lee Ann Kumari to have Dr Luz Marina Ayers sign script  Tony Brito can be reached at above number regarding this matter  Fax number is [phone]  Pt is scheduled for CT colonoscopy 7/27/2022      Scheduled procedure/appointment date if applicable: Procedure: 1/31/0286

## 2022-07-24 NOTE — TELEPHONE ENCOUNTER
Hi,    I faxed the script  I am not sure why they need the script since it is in epic and they are part of [facility] (so it should be visible and available to them)  Is there something else needed?

## 2022-07-25 DIAGNOSIS — K63.5 POLYP OF COLON, UNSPECIFIED PART OF COLON, UNSPECIFIED TYPE: Primary | ICD-10-CM

## 2022-07-25 NOTE — TELEPHONE ENCOUNTER
Could one of you please re-enter this script for a CT colon when you get the chance  Please and thank you

## 2022-07-25 NOTE — TELEPHONE ENCOUNTER
Hi,    I reprinted Dr Vela Code script yesterday and faxed it over  I am not sure why they need a physical copy since it is all within [facility]  I do not have access to a printed  Can you ask Tyesha David or Gisela Lang? Thank you!

## 2022-07-25 NOTE — TELEPHONE ENCOUNTER
I called the [facility] Radiology Department and was told a new script needs to be entered since they never received the one with your signature  Would you like to re-enter the script or would you like me to ask Robin Metz or Michelle Smith? Both Dub Knee are in office today

## 2022-07-26 ENCOUNTER — TELEPHONE (OUTPATIENT)
Dept: GASTROENTEROLOGY | Facility: CLINIC | Age: 67
End: 2022-07-26

## 2022-07-26 NOTE — TELEPHONE ENCOUNTER
After speaking with the insurance rep and clarifying the reason for the CT Colonoscopy the procedure clinical information has been faxed back

## 2022-07-26 NOTE — TELEPHONE ENCOUNTER
Waiting on for fax to be sent so clinical information can be provided for review   Tracking #: [id number]

## 2022-07-26 NOTE — TELEPHONE ENCOUNTER
Patients GI provider:  Dr Parag Thacker    Number to return call: [phone]     Reason for call: Juany calling from [facility] stating pt needs prior auth for CT colonoscopy      Scheduled procedure/appointment date if applicable: CT colon is scheduled for tomorrow 7/27/2022

## 2022-08-10 ENCOUNTER — RA CDI HCC (OUTPATIENT)
Dept: OTHER | Facility: HOSPITAL | Age: 67
End: 2022-08-10

## 2022-08-10 NOTE — PROGRESS NOTES
[facility]  coding opportunities       Chart reviewed, no opportunity found:   [address]        Patients Insurance     Medicare Insurance: [employer]

## 2022-08-12 ENCOUNTER — TELEPHONE (OUTPATIENT)
Dept: NEUROLOGY | Facility: CLINIC | Age: 67
End: 2022-08-12

## 2022-08-12 NOTE — TELEPHONE ENCOUNTER
Attempted to call pt for reminder of her appt with Kenneth Rust on 08/24/2022, pt did not answer, left a vm to call back when available

## 2022-08-24 ENCOUNTER — OFFICE VISIT (OUTPATIENT)
Dept: NEUROLOGY | Facility: CLINIC | Age: 67
End: 2022-08-24

## 2022-08-24 VITALS
HEART RATE: 100 BPM | HEIGHT: 63 IN | DIASTOLIC BLOOD PRESSURE: 90 MMHG | WEIGHT: 199.4 LBS | TEMPERATURE: 97.5 F | OXYGEN SATURATION: 93 % | SYSTOLIC BLOOD PRESSURE: 130 MMHG | BODY MASS INDEX: 35.33 KG/M2

## 2022-08-24 DIAGNOSIS — Z86.73 HISTORY OF STROKE: Primary | ICD-10-CM

## 2022-08-24 NOTE — PATIENT INSTRUCTIONS
History of CVA (cerebrovascular accident)  Pt denies any symptoms to suggest new stroke  Pt should continue Pradaxa and atorvastatin for secondary stroke prevention  Pt was encouraged to get regular exercise and follow a Mediterranean diet  If pt has any new stroke-like symptoms including sudden painless loss of vision or double vision, difficulty speaking or swallowing, vertigo / room spinning that does not quickly resolve, or weakness / numbness affecting 1 side of the face or body he should proceed by ambulance to the nearest emergency room immediately  I have spent 30 minutes with Patient  today in which greater than 50% of this time was spent in counseling/coordination of care regarding Diagnostic results, Prognosis, Risks and benefits of tx options, Intructions for management, Patient and family education, Importance of tx compliance, Risk factor reductions and Impressions  Pt will follow-up in 12 months

## 2022-08-24 NOTE — ASSESSMENT & PLAN NOTE
 Pt denies any symptoms to suggest new stroke   Pt should continue Pradaxa and atorvastatin for secondary stroke prevention   Pt was encouraged to get regular exercise and follow a Mediterranean diet   If pt has any new stroke-like symptoms including sudden painless loss of vision or double vision, difficulty speaking or swallowing, vertigo / room spinning that does not quickly resolve, or weakness / numbness affecting 1 side of the face or body he should proceed by ambulance to the nearest emergency room immediately   I have spent 30 minutes with Patient  today in which greater than 50% of this time was spent in counseling/coordination of care regarding Diagnostic results, Prognosis, Risks and benefits of tx options, Intructions for management, Patient and family education, Importance of tx compliance, Risk factor reductions and Impressions   Pt will follow-up in 12 months

## 2022-08-24 NOTE — PROGRESS NOTES
Patient ID: Vania Toure is a 79 y o  female  Assessment/Plan:    History of CVA (cerebrovascular accident)   Pt denies any symptoms to suggest new stroke   Pt should continue Pradaxa and atorvastatin for secondary stroke prevention   Pt was encouraged to get regular exercise and follow a Mediterranean diet   If pt has any new stroke-like symptoms including sudden painless loss of vision or double vision, difficulty speaking or swallowing, vertigo / room spinning that does not quickly resolve, or weakness / numbness affecting 1 side of the face or body he should proceed by ambulance to the nearest emergency room immediately   I have spent 30 minutes with Patient  today in which greater than 50% of this time was spent in counseling/coordination of care regarding Diagnostic results, Prognosis, Risks and benefits of tx options, Intructions for management, Patient and family education, Importance of tx compliance, Risk factor reductions and Impressions   Pt will follow-up in 12 months  Problem List Items Addressed This Visit    None     Visit Diagnoses     History of stroke    -  Primary             Subjective:    HPI    Vania Toure is a 79 y o  right handed female, with hypertension, hyperlipidemia, prothrombin mutation, tobacco use, CKD, GERD, depression and endometrial/ uterine cancer, who follows in the office due to history of stroke  She received tPA and underwent mechanical thrombectomy  She was maintained on Eliquis, ASA and atorvastatin for secondary stroke prevention  However, in January 2022 she was found to have bilat PEs  She was changed from Eliquis to Pradaxa  She now off ASA as well  She states that she is doing well  She is tolerating Pradaxa and atorvastatin without difficulty  She gets exercise as she is able  Exercise is limited by bilat knee OA  She does use a cane for ambulation  She denies any falls   She does not follow a specific diet but likes salad so she ends up eating a lot of salad  She does not smoke  She denies any symptoms to suggest new stroke  The following portions of the patient's history were reviewed and updated as appropriate: allergies, current medications, past family history, past medical history, past social history, past surgical history and problem list          Objective:    Blood pressure 130/90, pulse 100, temperature 97 5 °F (36 4 °C), temperature source Temporal, height 5' 3" (1 6 m), weight 90 4 kg (199 lb 6 4 oz), SpO2 93 %  Physical Exam  Vitals reviewed  Eyes:      General: Lids are normal       Extraocular Movements: Extraocular movements intact  Pupils: Pupils are equal, round, and reactive to light  Neurological:      Coordination: Coordination is intact  Deep Tendon Reflexes: Strength normal and reflexes are normal and symmetric  Psychiatric:         Speech: Speech normal          Neurological Exam  Mental Status   Oriented to person, place, time and situation  Recent and remote memory are intact  Speech is normal  Language is fluent with no aphasia  Attention and concentration are normal  Fund of knowledge is appropriate for level of education  Apraxia absent  Cranial Nerves  CN II: Visual acuity is normal  Visual fields full to confrontation  CN III, IV, VI: Extraocular movements intact bilaterally  Normal lids and orbits bilaterally  Pupils equal round and reactive to light bilaterally  CN V: Facial sensation is normal   CN VII: Full and symmetric facial movement  CN VIII: Hearing is normal   CN IX, X: Palate elevates symmetrically  Normal gag reflex  CN XI: Shoulder shrug strength is normal   CN XII: Tongue midline without atrophy or fasciculations  Motor   Strength is 5/5 throughout all four extremities  Sensory  Sensation is intact to light touch, pinprick, vibration and proprioception in all four extremities      Reflexes  Deep tendon reflexes are 2+ and symmetric in all four extremities  Coordination    Finger-to-nose, rapid alternating movements and heel-to-shin normal bilaterally without dysmetria  Gait    Antalgic gait with Lt foot turned out  Using a RW  ROS:    Agree with ROS as documented by the LPN  Review of systems personally reviewed

## 2022-08-24 NOTE — PROGRESS NOTES
Review of Systems   Constitutional: Negative  Negative for appetite change and fever  HENT: Negative  Negative for hearing loss, tinnitus, trouble swallowing and voice change  Eyes: Negative  Negative for photophobia and pain  Respiratory: Negative  Negative for shortness of breath  Cardiovascular: Negative  Negative for palpitations  Gastrointestinal: Negative  Negative for nausea and vomiting  Endocrine: Negative  Negative for cold intolerance  Genitourinary: Negative  Negative for dysuria, frequency and urgency  Musculoskeletal: Negative  Negative for myalgias and neck pain  Skin: Negative  Negative for rash  Neurological: Negative  Negative for dizziness, tremors, seizures, syncope, facial asymmetry, speech difficulty, weakness, light-headedness, numbness and headaches  Hematological: Negative  Does not bruise/bleed easily  Psychiatric/Behavioral: Positive for sleep disturbance  Negative for confusion and hallucinations  All other systems reviewed and are negative

## 2022-11-02 DIAGNOSIS — M48.061 SPINAL STENOSIS OF LUMBAR REGION, UNSPECIFIED WHETHER NEUROGENIC CLAUDICATION PRESENT: Primary | ICD-10-CM

## 2022-11-02 RX ORDER — GABAPENTIN 300 MG/1
300 CAPSULE ORAL 3 TIMES DAILY
Qty: 90 CAPSULE | Refills: 1 | Status: SHIPPED | OUTPATIENT
Start: 2022-11-02

## 2022-11-07 ENCOUNTER — RA CDI HCC (OUTPATIENT)
Dept: OTHER | Facility: HOSPITAL | Age: 67
End: 2022-11-07

## 2022-11-07 NOTE — PROGRESS NOTES
[facility]  coding opportunities     I11 0     Chart Reviewed number of suggestions sent to Provider: 1     Patients Insurance     Medicare Insurance: [facility] [address]

## 2022-11-10 ENCOUNTER — OFFICE VISIT (OUTPATIENT)
Dept: FAMILY MEDICINE CLINIC | Facility: CLINIC | Age: 67
End: 2022-11-10

## 2022-11-10 VITALS
SYSTOLIC BLOOD PRESSURE: 132 MMHG | HEIGHT: 63 IN | OXYGEN SATURATION: 95 % | DIASTOLIC BLOOD PRESSURE: 86 MMHG | RESPIRATION RATE: 16 BRPM | WEIGHT: 201.6 LBS | TEMPERATURE: 98.3 F | BODY MASS INDEX: 35.72 KG/M2 | HEART RATE: 90 BPM

## 2022-11-10 DIAGNOSIS — D68.52 PROTHROMBIN MUTATION (HCC): ICD-10-CM

## 2022-11-10 DIAGNOSIS — K51.90 ULCERATIVE COLITIS WITHOUT COMPLICATIONS, UNSPECIFIED LOCATION (HCC): ICD-10-CM

## 2022-11-10 DIAGNOSIS — Z11.59 NEED FOR HEPATITIS C SCREENING TEST: ICD-10-CM

## 2022-11-10 DIAGNOSIS — I26.99 BILATERAL PULMONARY EMBOLISM (HCC): ICD-10-CM

## 2022-11-10 DIAGNOSIS — Z13.29 SCREENING FOR THYROID DISORDER: ICD-10-CM

## 2022-11-10 DIAGNOSIS — I10 BENIGN ESSENTIAL HYPERTENSION: ICD-10-CM

## 2022-11-10 DIAGNOSIS — Z23 IMMUNIZATION DUE: ICD-10-CM

## 2022-11-10 DIAGNOSIS — D68.59 HYPERCOAGULABLE STATE (HCC): ICD-10-CM

## 2022-11-10 DIAGNOSIS — Q21.12 PFO (PATENT FORAMEN OVALE): ICD-10-CM

## 2022-11-10 DIAGNOSIS — Z13.89 SCREENING FOR GENITOURINARY CONDITION: ICD-10-CM

## 2022-11-10 DIAGNOSIS — Z78.0 POSTMENOPAUSAL: ICD-10-CM

## 2022-11-10 DIAGNOSIS — F33.41 RECURRENT MAJOR DEPRESSIVE DISORDER, IN PARTIAL REMISSION (HCC): ICD-10-CM

## 2022-11-10 DIAGNOSIS — D68.52 PROTHROMBIN G20210A MUTATION (HCC): ICD-10-CM

## 2022-11-10 DIAGNOSIS — Z13.21 ENCOUNTER FOR VITAMIN DEFICIENCY SCREENING: ICD-10-CM

## 2022-11-10 DIAGNOSIS — Z87.891 FORMER SMOKER: Chronic | ICD-10-CM

## 2022-11-10 DIAGNOSIS — Z00.00 MEDICARE ANNUAL WELLNESS VISIT, SUBSEQUENT: Primary | ICD-10-CM

## 2022-11-10 DIAGNOSIS — Z86.73 HISTORY OF CVA (CEREBROVASCULAR ACCIDENT): ICD-10-CM

## 2022-11-10 PROBLEM — R09.02 HYPOXIA: Status: RESOLVED | Noted: 2022-02-24 | Resolved: 2022-11-10

## 2022-11-10 NOTE — PROGRESS NOTES
Assessment and Plan:     Problem List Items Addressed This Visit        Digestive    Ulcerative colitis ([facility] )     Symptoms controlled  Patient be getting CT colonoscopy soon            Cardiovascular and Mediastinum    Benign essential hypertension    PFO (patent foramen ovale)       Hematopoietic and Hemostatic    Prothrombin [id number] mutation ([place] [address] )       Other    Former smoker (Chronic)    Hypercoagulable state ([place] [address] )    History of CVA (cerebrovascular accident)    Relevant Orders    Lipid panel    Major depressive disorder, recurrent, unspecified ([place] [address] )     Excellent control continue current regimen           Other Visit Diagnoses     Medicare annual wellness visit, subsequent    -  Primary    Bilateral pulmonary embolism ([place] [address] )        Prothrombin mutation ([place] [address] )        Relevant Orders    Comprehensive metabolic panel    CBC and differential    Need for hepatitis C screening test        Relevant Orders    Hepatitis C Antibody ([facility])    Postmenopausal        Relevant Orders    DXA bone density spine hip and pelvis    Screening for thyroid disorder        Relevant Orders    TSH, 3rd generation    Screening for genitourinary condition        Relevant Orders    UA (URINE) with reflex to Scope    Encounter for vitamin deficiency screening        Relevant Orders    Vitamin D Panel    Immunization due        Relevant Orders    influenza vaccine, high-dose, PF 0 7 mL (FLUZONE HIGH-DOSE) (Completed)    Pneumococcal Conjugate Vaccine 20-valent (Pcv20) (Completed)           Preventive health issues were discussed with patient, and age appropriate screening tests were ordered as noted in patient's After Visit Summary  Personalized health advice and appropriate referrals for health education or preventive services given if needed, as noted in patient's After Visit Summary  History of Present Illness:     Patient presents for a Medicare Wellness Visit    Patient is here after several months    She has with extensive history of hypertension hyperlipidemia prothrombin mutation CVA 1021 status post tPA and status post thrombectomy discharge on Eliquis in aspirin and later development of COVID pneumonia with pulmoanry embolism switched over to Pradaxa  Patient has been of Pradaxa 150 mg a day  She reports no further shortness of breath  She is walking with a cane  Recent neurology appointment was reviewed  Patient is following regularly with her hematologist   She be getting a CT colonoscopy soon because of history of positive Cologuard with personal history of colon polyps   Last cardiology consultation reviewed  Patient had mammogram which was unremarkable  Is due for DEXA scan  She will receive flu and Prevnar 20 today  Diastolic blood pressure slightly elevated heart is slightly on the higher side  She reports feeling anxious today  Lab studies will be ordered  She is taking statins without any concerns  She reports depression to be well controlled on current regimen         Patient Care Team:  Fina Barrera MD as PCP - General  Terell Shook, MD Donna Kirkpatrick MD Rannie Mike, MD as Endoscopist     Review of Systems:     Review of Systems     Problem List:     Patient Active Problem List   Diagnosis   • Osteoarthritis of left hip   • Benign essential hypertension   • Chronic insomnia   • Depressive disorder   • Dyslipidemia   • Dyspepsia   • Gastroesophageal reflux disease without esophagitis   • Hip arthritis   • Hyperlipidemia   • Irritable bowel syndrome   • Hypercoagulable state [facility])   • Spinal stenosis of lumbar region   • Vitamin D deficiency   • Prothrombin [id number] mutation ([facility] )   • History of CVA (cerebrovascular accident)   • Hx of pulmonary embolus   • Obesity, morbid ([facility] )   • COVID-19 virus infection   • Ulcerative colitis ([facility] )   • Former smoker   • Shortness of breath   • Pulmonary hypertension ([facility] )   • PFO (patent foramen ovale)   • Major depressive disorder, recurrent, unspecified ([facility] )   • CLEM (obstructive sleep apnea)      Past Medical and Surgical History:     Past Medical History:   Diagnosis Date   • Acid reflux    • Anxiety    • Arthritis    • Back pain    • Carpal tunnel syndrome, bilateral    • Depression    • Endometrial cancer ([facility] ) 1997   • Hiatal hernia    • History of DVT of lower extremity     Right leg-had IVc filter  removal today-6/6/2016   • History of pulmonary embolism    • History of uterine cancer     Was stage 3- uterine, ovaries-hysterectomy   • Hypercholesteremia    • Hypertension    • Left leg pain    • Lumbar herniated disc    • Tumor cells, benign     In right atrium  Probably congenital    • Varicose vein of leg    • Wears glasses      Past Surgical History:   Procedure Laterality Date   • COLONOSCOPY     • DENTAL SURGERY     • DILATION AND CURETTAGE OF UTERUS     • ESOPHAGOGASTRODUODENOSCOPY     • FOOT SURGERY      LAST ASSESSED: [id number]   • HYSTERECTOMY  1997    uterine cancer-hx   • IR STROKE ALERT  10/20/2021   • JOINT REPLACEMENT      b/l   THR   • OOPHORECTOMY Bilateral 1997   • NJ COLONOSCOPY FLX DX W/COLLJ SPEC WHEN PFRMD N/A 6/14/2017    Procedure: EGD AND COLONOSCOPY;  Surgeon: Jack Oconnell MD;  Location: [facility];   Service: Gastroenterology   • NJ TOTAL HIP ARTHROPLASTY Left 4/13/2016    Procedure: ARTHROPLASTY HIP TOTAL;  Surgeon: Shanda Up DO;  Location: [facility];  Service: Orthopedics   • TONSILLECTOMY AND ADENOIDECTOMY     • TOTAL HIP ARTHROPLASTY Right     LAST ASSESSED: [id number]      Family History:     Family History   Problem Relation Age of Onset   • Heart failure Father    • Colon cancer Father 62   • Arthritis Family    • No Known Problems Sister    • No Known Problems Daughter    • No Known Problems Maternal Grandmother    • No Known Problems Maternal Grandfather    • No Known Problems Paternal Grandmother    • No Known Problems Paternal Grandfather    • No Known Problems Sister    • No Known Problems Maternal Aunt    • No Known Problems Paternal Aunt       Social History:     Social History     Socioeconomic History   • Marital status:      Spouse name: None   • Number of children: 1   • Years of education: None   • Highest education level: None   Occupational History   • Occupation: retired   Tobacco Use   • Smoking status: Former Smoker     Packs/day: 0 50     Years: 50 00     Pack years: 25 00     Types: Cigarettes     Quit date: 2022     Years since quittin 8   • Smokeless tobacco: Never Used   Vaping Use   • Vaping Use: Never used   Substance and Sexual Activity   • Alcohol use: Yes     Comment: 2 or 3 monthly   • Drug use: No   • Sexual activity: Not Currently     Partners: Male   Other Topics Concern   • None   Social History Narrative    Coffee    Daily caffeinated cola consumption     Social Determinants of Health     Financial Resource Strain: Low Risk    • Difficulty of Paying Living Expenses: Not very hard   Food Insecurity: No Food Insecurity   • Worried About Running Out of Food in the Last Year: Never true   • Ran Out of Food in the Last Year: Never true   Transportation Needs: Unmet Transportation Needs   • Lack of Transportation (Medical): Yes   • Lack of Transportation (Non-Medical):  No   Physical Activity: Not on file   Stress: Not on file   Social Connections: Not on file   Intimate Partner Violence: Not on file   Housing Stability: Low Risk    • Unable to Pay for Housing in the Last Year: No   • Number of Places Lived in the Last Year: 1   • Unstable Housing in the Last Year: No      Medications and Allergies:     Current Outpatient Medications   Medication Sig Dispense Refill   • acetaminophen (TYLENOL) 325 mg tablet Take 2 tablets (650 mg total) by mouth every 6 (six) hours as needed for fever  0   • atorvastatin (LIPITOR) 40 mg tablet TAKE 1 TABLET BY MOUTH EVERY DAY IN THE EVENING 90 tablet 1   • FLUoxetine (PROzac) 10 mg capsule TAKE 3 CAPSULES BY MOUTH EVERY  capsule 0   • FLUoxetine (PROzac) 20 mg capsule Take 1 tab with a 10mg tab daily to equal 30mg daily  90 capsule 1   • gabapentin (NEURONTIN) 300 mg capsule Take 1 capsule (300 mg total) by mouth 3 (three) times a day 90 capsule 1   • pantoprazole (PROTONIX) 20 mg tablet TAKE 1 TABLET BY MOUTH EVERY DAY 90 tablet 1   • Pradaxa 150 MG capsu TAKE 1 CAPSULE (150 MG TOTAL) BY MOUTH EVERY 12 (TWELVE) HOURS 60 capsule 6   • cyclobenzaprine (FLEXERIL) 5 mg tablet Take 1 tablet (5 mg total) by mouth 3 (three) times a day as needed for muscle spasms (Patient not taking: No sig reported) 20 tablet 0   • FLUoxetine (PROzac) 10 mg capsule Take 1 tab with a 10mg tab daily to equal 30mg daily  (Patient not taking: Reported on 11/10/2022) 90 capsule 1     No current facility-administered medications for this visit       Allergies   Allergen Reactions   • Percocet [Oxycodone-Acetaminophen] Hives   • Amlodipine    • Percocet  [Oxycodone-Acetaminophen]       Immunizations:     Immunization History   Administered Date(s) Administered   • COVID-19 PFIZER VACCINE 0 3 ML IM 04/01/2021, 04/22/2021   • COVID-19 Pfizer vac (Chau-sucrose, gray cap) 12 yr+ IM 02/12/2022   • INFLUENZA 09/01/2011, 11/01/2014, 10/22/2015, 10/22/2015, 11/01/2016, 11/01/2016, 03/21/2017, 01/27/2022   • Influenza Quadrivalent Preservative Free 3 years and older IM 03/21/2017, 11/20/2017   • Influenza, high dose seasonal 0 7 mL 10/21/2021, 11/10/2022   • Influenza, injectable, quadrivalent, preservative free 0 5 mL 09/12/2020   • Influenza, recombinant, quadrivalent,injectable, preservative free 10/10/2018   • Pneumococcal Conjugate 13-Valent 03/21/2017   • Pneumococcal Conjugate Vaccine 20-valent (Pcv20), Polysace 11/10/2022   • Pneumococcal Polysaccharide PPV23 01/01/2011   • Zoster 10/22/2015, 01/01/2017      Health Maintenance:         Topic Date Due   • Hepatitis C Screening  Never done   • Lung Cancer Screening  01/21/2023   • Breast Cancer Screening: Mammogram  03/01/2023   • Colorectal Cancer Screening  06/10/2025         Topic Date Due   • COVID-19 Vaccine (4 - Booster for Pfizer series) 06/12/2022      Medicare Screening Tests and Risk Assessments:     aVhid Delarosa is here for her Subsequent Wellness visit  Health Risk Assessment:   Patient rates overall health as good  Patient feels that their physical health rating is much better  Patient is satisfied with their life  Eyesight was rated as same  Hearing was rated as same  Patient feels that their emotional and mental health rating is slightly better  Patients states they are never, rarely angry  Patient states they are sometimes unusually tired/fatigued  Pain experienced in the last 7 days has been some  Patient's pain rating has been 5/10  Patient states that she has experienced no weight loss or gain in last 6 months  Depression Screening:   PHQ-9 Score: 2      Fall Risk Screening: In the past year, patient has experienced: history of falling in past year      Urinary Incontinence Screening:   Patient has not leaked urine accidently in the last six months  Home Safety:  Patient has trouble with stairs inside or outside of their home  Patient has working smoke alarms and has working carbon monoxide detector  Home safety hazards include: none  Nutrition:   Current diet is No Added Salt  Medications:   Patient is currently taking over-the-counter supplements  OTC medications include: Tylenol when needed  Patient is able to manage medications  Activities of Daily Living (ADLs)/Instrumental Activities of Daily Living (IADLs):   Walk and transfer into and out of bed and chair?: Yes  Dress and groom yourself?: Yes    Bathe or shower yourself?: Yes    Feed yourself?  Yes  Do your laundry/housekeeping?: Yes  Manage your money, pay your bills and track your expenses?: Yes  Make your own meals?: Yes    Do your own shopping?: Yes    Previous Hospitalizations:   Any hospitalizations or ED visits within the last 12 months?: No      Advance Care Planning:   Living will: Yes    Durable POA for healthcare: Yes    Advanced directive: Yes      Comments: Patient will bring us a copy of living    Cognitive Screening:   Provider or family/friend/caregiver concerned regarding cognition?: No    PREVENTIVE SCREENINGS      Cardiovascular Screening:    General: Screening Not Indicated and History Lipid Disorder    Due for: Lipid Panel      Diabetes Screening:     General: Screening Current    Due for: Blood Glucose      Colorectal Cancer Screening:     General: Screening Current      Breast Cancer Screening:     General: Screening Current      Cervical Cancer Screening:    General: Screening Not Indicated      Osteoporosis Screening:      Due for: DXA Axial      Abdominal Aortic Aneurysm (AAA) Screening:        General: Screening Current      Lung Cancer Screening:     General: Screening Current      Hepatitis C Screening:      Hep C Screening Accepted: Yes      Screening, Brief Intervention, and Referral to Treatment (SBIRT)    Screening  Typical number of drinks in a day: 0  Typical number of drinks in a week: 4  Interpretation: Low risk drinking behavior  AUDIT-C Screenin) How often did you have a drink containing alcohol in the past year? 2 to 4 times a month  2) How many drinks did you have on a typical day when you were drinking in the past year? 5 to 6  3) How often did you have 6 or more drinks on one occasion in the past year? weekly    AUDIT-C Score: 5  Interpretation: Score 3-12 (female): POSITIVE screen for alcohol misuse    AUDIT Screenin) How often during the last year have you found that you were not able to stop drinking once you had started?  0 - never  5) How often during the last year have you failed to do what was normally expected from you because of drinking? 0 - never  6) How often during the last year have you needed a first drink in the morning to get yourself going after a heavy drinking session? 0 - never  7) How often during the last year have you had a feeling of guilt or remorse after drinking? 0 - never  8) How often during the last year have you been unable to remember what happened the night before because you had been drinking? 0 - never  9) Have you or someone else been injured as a result of your drinking? 0 - no  10) Has a relative or friend or a doctor or another health worker been concerned about your drinking or suggested you cut down? 0 - no    AUDIT Score: 5  Interpretation: Low risk alcohol consumption    Single Item Drug Screening:  How often have you used an illegal drug (including marijuana) or a prescription medication for non-medical reasons in the past year? never    Single Item Drug Screen Score: 0  Interpretation: Negative screen for possible drug use disorder    No exam data present     Physical Exam:     /86 (BP Location: Left arm, Patient Position: Sitting, Cuff Size: Standard)   Pulse 90   Temp 98 3 °F (36 8 °C) (Tympanic)   Resp 16   Ht 5' 3" (1 6 m)   Wt 91 4 kg (201 lb 9 6 oz)   SpO2 95%   BMI 35 71 kg/m²     Physical Exam  Neck:      Vascular: No carotid bruit  Cardiovascular:      Rate and Rhythm: Normal rate and regular rhythm  Heart sounds: No murmur heard  Pulmonary:      Effort: Pulmonary effort is normal  No respiratory distress  Breath sounds: Normal breath sounds  No wheezing or rales  Abdominal:      General: Bowel sounds are normal  There is no distension  Tenderness: There is no abdominal tenderness  There is no guarding  Musculoskeletal:      Right lower leg: No edema  Left lower leg: No edema  Lymphadenopathy:      Cervical: No cervical adenopathy  Neurological:      Mental Status: She is alert  Motor: No weakness     Psychiatric:         Mood and Affect: Mood normal          Behavior: Behavior normal           Brooke Saleh MD

## 2022-12-05 ENCOUNTER — APPOINTMENT (OUTPATIENT)
Dept: LAB | Facility: CLINIC | Age: 67
End: 2022-12-05

## 2022-12-05 DIAGNOSIS — Z11.59 NEED FOR HEPATITIS C SCREENING TEST: ICD-10-CM

## 2022-12-05 LAB
ALBUMIN SERPL BCP-MCNC: 3.3 G/DL (ref 3.5–5)
ALP SERPL-CCNC: 60 U/L (ref 46–116)
ALT SERPL W P-5'-P-CCNC: 17 U/L (ref 12–78)
ANION GAP SERPL CALCULATED.3IONS-SCNC: 8 MMOL/L (ref 4–13)
AST SERPL W P-5'-P-CCNC: 22 U/L (ref 5–45)
BACTERIA UR QL AUTO: ABNORMAL /HPF
BASOPHILS # BLD AUTO: 0.08 THOUSANDS/ÂΜL (ref 0–0.1)
BASOPHILS NFR BLD AUTO: 1 % (ref 0–1)
BILIRUB SERPL-MCNC: 0.72 MG/DL (ref 0.2–1)
BILIRUB UR QL STRIP: NEGATIVE
BUN SERPL-MCNC: 10 MG/DL (ref 5–25)
CALCIUM ALBUM COR SERPL-MCNC: 9.9 MG/DL (ref 8.3–10.1)
CALCIUM SERPL-MCNC: 9.3 MG/DL (ref 8.3–10.1)
CHLORIDE SERPL-SCNC: 103 MMOL/L (ref 96–108)
CHOLEST SERPL-MCNC: 120 MG/DL
CLARITY UR: ABNORMAL
CO2 SERPL-SCNC: 26 MMOL/L (ref 21–32)
COLOR UR: YELLOW
CREAT SERPL-MCNC: 1.05 MG/DL (ref 0.6–1.3)
EOSINOPHIL # BLD AUTO: 0.07 THOUSAND/ÂΜL (ref 0–0.61)
EOSINOPHIL NFR BLD AUTO: 1 % (ref 0–6)
ERYTHROCYTE [DISTWIDTH] IN BLOOD BY AUTOMATED COUNT: 17.3 % (ref 11.6–15.1)
GFR SERPL CREATININE-BSD FRML MDRD: 55 ML/MIN/1.73SQ M
GLUCOSE P FAST SERPL-MCNC: 103 MG/DL (ref 65–99)
GLUCOSE UR STRIP-MCNC: NEGATIVE MG/DL
HCT VFR BLD AUTO: 44.1 % (ref 34.8–46.1)
HCV AB SER QL: NORMAL
HDLC SERPL-MCNC: 45 MG/DL
HGB BLD-MCNC: 13.6 G/DL (ref 11.5–15.4)
HGB UR QL STRIP.AUTO: NEGATIVE
HYALINE CASTS #/AREA URNS LPF: ABNORMAL /LPF
IMM GRANULOCYTES # BLD AUTO: 0.03 THOUSAND/UL (ref 0–0.2)
IMM GRANULOCYTES NFR BLD AUTO: 0 % (ref 0–2)
KETONES UR STRIP-MCNC: NEGATIVE MG/DL
LDLC SERPL CALC-MCNC: 28 MG/DL (ref 0–100)
LEUKOCYTE ESTERASE UR QL STRIP: NEGATIVE
LYMPHOCYTES # BLD AUTO: 2.36 THOUSANDS/ÂΜL (ref 0.6–4.47)
LYMPHOCYTES NFR BLD AUTO: 31 % (ref 14–44)
MCH RBC QN AUTO: 30.3 PG (ref 26.8–34.3)
MCHC RBC AUTO-ENTMCNC: 30.8 G/DL (ref 31.4–37.4)
MCV RBC AUTO: 98 FL (ref 82–98)
MONOCYTES # BLD AUTO: 0.56 THOUSAND/ÂΜL (ref 0.17–1.22)
MONOCYTES NFR BLD AUTO: 7 % (ref 4–12)
MUCOUS THREADS UR QL AUTO: ABNORMAL
NEUTROPHILS # BLD AUTO: 4.49 THOUSANDS/ÂΜL (ref 1.85–7.62)
NEUTS SEG NFR BLD AUTO: 60 % (ref 43–75)
NITRITE UR QL STRIP: NEGATIVE
NON-SQ EPI CELLS URNS QL MICRO: ABNORMAL /HPF
NONHDLC SERPL-MCNC: 75 MG/DL
NRBC BLD AUTO-RTO: 0 /100 WBCS
PH UR STRIP.AUTO: 6.5 [PH]
PLATELET # BLD AUTO: 298 THOUSANDS/UL (ref 149–390)
PMV BLD AUTO: 11.4 FL (ref 8.9–12.7)
POTASSIUM SERPL-SCNC: 3.7 MMOL/L (ref 3.5–5.3)
PROT SERPL-MCNC: 7.1 G/DL (ref 6.4–8.4)
PROT UR STRIP-MCNC: ABNORMAL MG/DL
RBC # BLD AUTO: 4.49 MILLION/UL (ref 3.81–5.12)
RBC #/AREA URNS AUTO: ABNORMAL /HPF
SODIUM SERPL-SCNC: 137 MMOL/L (ref 135–147)
SP GR UR STRIP.AUTO: 1.02 (ref 1–1.03)
TRIGL SERPL-MCNC: 233 MG/DL
TSH SERPL DL<=0.05 MIU/L-ACNC: 1.64 UIU/ML (ref 0.45–4.5)
UROBILINOGEN UR STRIP-ACNC: <2 MG/DL
WBC # BLD AUTO: 7.59 THOUSAND/UL (ref 4.31–10.16)
WBC #/AREA URNS AUTO: ABNORMAL /HPF

## 2022-12-09 DIAGNOSIS — R10.13 DYSPEPSIA: ICD-10-CM

## 2022-12-12 RX ORDER — PANTOPRAZOLE SODIUM 20 MG/1
TABLET, DELAYED RELEASE ORAL
Qty: 90 TABLET | Refills: 1 | Status: SHIPPED | OUTPATIENT
Start: 2022-12-12

## 2022-12-13 LAB
25(OH)D2 SERPL-MCNC: 7.7 NG/ML
25(OH)D3 SERPL-MCNC: 5.7 NG/ML
25(OH)D3+25(OH)D2 SERPL-MCNC: 13 NG/ML

## 2022-12-16 DIAGNOSIS — E55.9 VITAMIN D DEFICIENCY: Primary | ICD-10-CM

## 2022-12-16 RX ORDER — ERGOCALCIFEROL 1.25 MG/1
50000 CAPSULE ORAL WEEKLY
Qty: 8 CAPSULE | Refills: 0 | Status: SHIPPED | OUTPATIENT
Start: 2022-12-16

## 2023-01-06 DIAGNOSIS — M48.061 SPINAL STENOSIS OF LUMBAR REGION, UNSPECIFIED WHETHER NEUROGENIC CLAUDICATION PRESENT: ICD-10-CM

## 2023-01-06 RX ORDER — GABAPENTIN 300 MG/1
CAPSULE ORAL
Qty: 90 CAPSULE | Refills: 1 | Status: SHIPPED | OUTPATIENT
Start: 2023-01-06

## 2023-01-09 DIAGNOSIS — I63.412 CEREBROVASCULAR ACCIDENT (CVA) DUE TO EMBOLISM OF LEFT MIDDLE CEREBRAL ARTERY (HCC): ICD-10-CM

## 2023-01-09 DIAGNOSIS — F32.A DEPRESSION, UNSPECIFIED DEPRESSION TYPE: ICD-10-CM

## 2023-01-09 RX ORDER — ATORVASTATIN CALCIUM 40 MG/1
TABLET, FILM COATED ORAL
Qty: 90 TABLET | Refills: 1 | Status: SHIPPED | OUTPATIENT
Start: 2023-01-09

## 2023-01-09 RX ORDER — FLUOXETINE 10 MG/1
CAPSULE ORAL
Qty: 90 CAPSULE | Refills: 1 | Status: SHIPPED | OUTPATIENT
Start: 2023-01-09

## 2023-05-11 DIAGNOSIS — M48.061 SPINAL STENOSIS OF LUMBAR REGION, UNSPECIFIED WHETHER NEUROGENIC CLAUDICATION PRESENT: ICD-10-CM

## 2023-05-11 RX ORDER — GABAPENTIN 300 MG/1
CAPSULE ORAL
Qty: 90 CAPSULE | Refills: 1 | Status: SHIPPED | OUTPATIENT
Start: 2023-05-11

## 2023-07-03 DIAGNOSIS — F32.A DEPRESSION, UNSPECIFIED DEPRESSION TYPE: ICD-10-CM

## 2023-07-03 DIAGNOSIS — R10.13 DYSPEPSIA: ICD-10-CM

## 2023-07-03 DIAGNOSIS — I63.412 CEREBROVASCULAR ACCIDENT (CVA) DUE TO EMBOLISM OF LEFT MIDDLE CEREBRAL ARTERY (HCC): ICD-10-CM

## 2023-07-03 RX ORDER — ATORVASTATIN CALCIUM 40 MG/1
TABLET, FILM COATED ORAL
Qty: 90 TABLET | Refills: 1 | Status: SHIPPED | OUTPATIENT
Start: 2023-07-03

## 2023-07-03 RX ORDER — PANTOPRAZOLE SODIUM 20 MG/1
TABLET, DELAYED RELEASE ORAL
Qty: 90 TABLET | Refills: 1 | Status: SHIPPED | OUTPATIENT
Start: 2023-07-03

## 2023-07-03 RX ORDER — FLUOXETINE 10 MG/1
CAPSULE ORAL
Qty: 90 CAPSULE | Refills: 1 | Status: SHIPPED | OUTPATIENT
Start: 2023-07-03 | End: 2023-07-11

## 2023-07-11 DIAGNOSIS — F32.A DEPRESSION, UNSPECIFIED DEPRESSION TYPE: ICD-10-CM

## 2023-07-11 DIAGNOSIS — I63.412 CEREBROVASCULAR ACCIDENT (CVA) DUE TO EMBOLISM OF LEFT MIDDLE CEREBRAL ARTERY (HCC): ICD-10-CM

## 2023-07-11 RX ORDER — FLUOXETINE 10 MG/1
CAPSULE ORAL
Qty: 90 CAPSULE | Refills: 1 | Status: SHIPPED | OUTPATIENT
Start: 2023-07-11

## 2023-07-11 RX ORDER — FLUOXETINE HYDROCHLORIDE 20 MG/1
CAPSULE ORAL
Qty: 90 CAPSULE | Refills: 1 | Status: SHIPPED | OUTPATIENT
Start: 2023-07-11

## 2023-08-25 DIAGNOSIS — Z86.711 HX OF PULMONARY EMBOLUS: ICD-10-CM

## 2023-08-25 DIAGNOSIS — I63.412 CEREBROVASCULAR ACCIDENT (CVA) DUE TO EMBOLISM OF LEFT MIDDLE CEREBRAL ARTERY (HCC): ICD-10-CM

## 2023-08-25 DIAGNOSIS — M48.061 SPINAL STENOSIS OF LUMBAR REGION, UNSPECIFIED WHETHER NEUROGENIC CLAUDICATION PRESENT: ICD-10-CM

## 2023-08-25 RX ORDER — ATENOLOL 100 MG/1
TABLET ORAL
Qty: 180 CAPSULE | Refills: 0 | Status: SHIPPED | OUTPATIENT
Start: 2023-08-25

## 2023-08-28 DIAGNOSIS — M48.061 SPINAL STENOSIS OF LUMBAR REGION, UNSPECIFIED WHETHER NEUROGENIC CLAUDICATION PRESENT: ICD-10-CM

## 2023-08-28 RX ORDER — GABAPENTIN 300 MG/1
CAPSULE ORAL
Qty: 90 CAPSULE | Refills: 1 | Status: SHIPPED | OUTPATIENT
Start: 2023-08-28

## 2023-08-29 RX ORDER — GABAPENTIN 300 MG/1
300 CAPSULE ORAL 3 TIMES DAILY
Qty: 90 CAPSULE | Refills: 0 | OUTPATIENT
Start: 2023-08-29

## 2023-09-05 ENCOUNTER — TELEPHONE (OUTPATIENT)
Dept: HEMATOLOGY ONCOLOGY | Facility: CLINIC | Age: 68
End: 2023-09-05

## 2023-09-05 DIAGNOSIS — Z86.711 HX OF PULMONARY EMBOLUS: ICD-10-CM

## 2023-09-05 DIAGNOSIS — I63.412 CEREBROVASCULAR ACCIDENT (CVA) DUE TO EMBOLISM OF LEFT MIDDLE CEREBRAL ARTERY (HCC): ICD-10-CM

## 2023-09-05 NOTE — TELEPHONE ENCOUNTER
Finance team working on authorization of Pradaxa. Spoke with patient, she reports having medication left at this time. Pt was made aware to continue prescribing her medication we need to see her in the office at [facility] one a year. Pt was agreeable to follow up appointment on 10/10/23 1:30 PM [facility].

## 2023-10-10 ENCOUNTER — OFFICE VISIT (OUTPATIENT)
Dept: HEMATOLOGY ONCOLOGY | Facility: CLINIC | Age: 68
End: 2023-10-10
Payer: COMMERCIAL

## 2023-10-10 VITALS
SYSTOLIC BLOOD PRESSURE: 130 MMHG | HEIGHT: 63 IN | BODY MASS INDEX: 35.61 KG/M2 | RESPIRATION RATE: 17 BRPM | HEART RATE: 104 BPM | WEIGHT: 201 LBS | TEMPERATURE: 98 F | OXYGEN SATURATION: 92 % | DIASTOLIC BLOOD PRESSURE: 90 MMHG

## 2023-10-10 DIAGNOSIS — D68.59 HYPERCOAGULABLE STATE (HCC): ICD-10-CM

## 2023-10-10 DIAGNOSIS — E66.01 OBESITY, MORBID (HCC): ICD-10-CM

## 2023-10-10 DIAGNOSIS — N18.30 STAGE 3 CHRONIC KIDNEY DISEASE, UNSPECIFIED WHETHER STAGE 3A OR 3B CKD (HCC): ICD-10-CM

## 2023-10-10 DIAGNOSIS — I82.509 CHRONIC DEEP VEIN THROMBOSIS (DVT) OF LOWER EXTREMITY, UNSPECIFIED LATERALITY, UNSPECIFIED VEIN (HCC): Primary | ICD-10-CM

## 2023-10-10 PROCEDURE — 99215 OFFICE O/P EST HI 40 MIN: CPT | Performed by: PHYSICIAN ASSISTANT

## 2023-10-10 RX ORDER — DABIGATRAN ETEXILATE 75 MG/1
75 CAPSULE ORAL EVERY 12 HOURS SCHEDULED
Qty: 60 CAPSULE | Refills: 2 | Status: SHIPPED | OUTPATIENT
Start: 2023-10-10 | End: 2023-10-22

## 2023-10-10 NOTE — PROGRESS NOTES
Hematology/Oncology Outpatient Follow- up Note  Eduardo Dobbs 76 y.o. female MRN: @ Encounter: [id number]        Date:  10/10/2023      Assessment / Plan:    1. History of DVT 2010 while actively smoking on OCPs treated with anticoagulation. 2. History of PE 12/2015 after right hip replacement. 3. Heterozygous for prothrombin gene mutation  4. S/P 4/2016 L hip replacement. Bridged from coumadin to Lovenox. IVC filter placed presurgery and removed 2 months later. Coumadin discontinued summer 2016.        5.  She was instructed to take ASA 81mg po daily since 2018.  Patient reported non compliance.       6.  CVA 10/2021.  She received TPA.  BRAYDEN which showed PFO.  started on Eliquis in setting of her hypercoagulable state and new stroke. Advised to take aspirin as well.     7.  Covid pneumonia 1/2022.  Diffuse PE with right heart strain diagnosed 1/2022.  No evidence of BLE clot on venous doppler.  Eliquis discontinued.  Pradaxa 150 mg p.o. B.i.d rx.     She reported compliance with Pradaxa. After 9 months of therapy, possible reduction to 75mg po bid was discussed at her 4/2022 office visit. She was asked to f/u 12/2022. At her 11/22 f/u with Dr. Isaura Kumar, reported she was taking Pradaxa 150mg po daily. Patient confirms this today. Patient's EGFR is 55 - 60mL/min  Half life of Pradaxa is 12-17 hours. If EGFR< 30, half life is >24 hours. Instead of taking Pradaxa 150mg po daily, she is agreeable to take Pradaxa 75mg po bid. F/U in 1 year.                 HPI:    Rachele Charles is a 57J. o. seen 10/31/2018 at the referral of Gee Bray MD regarding heterozygous prothrombin gene mutation and history of DVT in 2010 and pulmonary embolism 12/2015 after she had a right hip replacement.      2/2016 she met with Dr. Mona Escudero. It was noted that she had a history or RLE DVT 5/2010 when she was on OCPs and smoking. Her mother has a history of DVT.  Natan Bee was tested at [facility] and was found to have prothrombin gene mutation, heterozygous. She was treated with Lovenox and then Coumadin and then discontinued.     She underwent right total hip replacement in December 8253 complicated with small pulmonary embolus in the right lower lobe and was on coumadin. Recommendations at the 2/2016 office visit were that the "patient to continue Coumadin and prior to her elective date of left hip replacement I suggest strongly IVC filter by IR and bridged the patient with Lovenox 40 mg for 3 days and stop one day before the surgery and then resume Lovenox at 40 mg after the surgery and a bridge with Coumadin to keep INR between 2 and 3 for at least 3 more months  #6. IVC filter could be retrieved within 90 days after the surgery by IR."     4/13/2016 she had LEFT total hip arthoplasty. "Given her hx of a PE, an IVC filter was placed prior to her surgery that same day.  Postoperatively she was started on coumadin with a lovenox bridge and sequential compression pumps as well as JAYA stockings were used for DVT prophylaxis"   6/2016 she had IVC filter removed.  She completed 3 months of coumadin.  States she was very happy when she was told she did not have to take any additional coumadin/"blood thinners." She used to take ASA 81mg po daily but has not in some time.      At her 10/2018 appointment, we reviewed history, increased factors for clotting and options.        Ultimately she was agreeable to take ASA 81mg po daily.      Patient admitted 10/20-10/24/21 due to stroke like symptoms of aphasia, confusion, dysarthria and right upper extremity weakness. Patient received tPA and she also had thrombectomy with interval left M1 lysis by tPA and scattered distal emboli in left M4 segment.  Patient had a BRAYDEN which showed PFO. Patient was started on Eliquis in setting of her hypercoagulable state and new stroke. Advised to take aspirin as well.        Admitted 1/21 - 1/31/22 due to acute respiratory failure on hypoxia.  She had covid exposure 12/2021. Kristal Reynolds CT scan appearance was consistent with COVID pneumonia. She received dexamethasone and baricitinib, discharged home with the steroid taper with prednisone. Marisela Garcia was discharged on oxygen.     CTA 1/2022: Peripheral pulmonary emboli seen in several segmental and subsegmental branches in the left upper lobe as well as partially occlusive thrombus in the distal left main pulmonary artery extending into the left lower lobe segmental and subsegmental branches. 1/24/22: BLE venous dopplers: No evidence of acute or chronic deep vein thrombosis.     Discharged home on Pradaxa 150 mg p.o. B.i.d.    6/2022 Cardiology evaluation. No plan for PFO closure. Interval History:    She reported compliance with Pradaxa. After 9 months of therapy, possible reduction to 75mg po bid was discussed at her 4/2022 office visit. She was asked to f/u 12/2022. At her 11/22 f/u with Dr. Elsi Shea, reported she was taking Pradaxa 150mg po daily. Patient confirms that she is taking 150mg po daily today for > 1 year. 6/22 colonoscopy - Multiple severe diverticula containing stool and causing severe luminal narrowing (not traversable) with no bleeding in the rectosigmoid. Not traversable despite changing to pediatric colonoscope and endoscope. Moderate, localized abnormal mucosa with erosion in the rectosigmoid. Possible SCAD. No bleeding episodes, feels well. Review of Systems   Constitutional: Negative for appetite change, chills, diaphoresis, fatigue, fever and unexpected weight change. HENT:   Negative for mouth sores, nosebleeds, sore throat, tinnitus and voice change. Eyes: Negative for eye problems. Respiratory: Negative for chest tightness, cough, shortness of breath and wheezing. Cardiovascular: Negative for chest pain, leg swelling and palpitations.    Gastrointestinal: Negative for abdominal distention, abdominal pain, blood in stool, constipation, diarrhea, nausea, rectal pain and vomiting. Endocrine: Negative for hot flashes. Genitourinary: Negative. Musculoskeletal: Negative for gait problem and myalgias. Skin: Negative for itching and rash. Neurological: Negative for dizziness, gait problem, headaches, light-headedness and numbness. Hematological: Negative for adenopathy. Psychiatric/Behavioral: Negative for confusion and sleep disturbance. The patient is not nervous/anxious. Test Results:        Labs:   Lab Results   Component Value Date    HGB 13.6 12/05/2022    HCT 44.1 12/05/2022    MCV 98 12/05/2022     12/05/2022    WBC 7.59 12/05/2022    NRBC 0 12/05/2022     Lab Results   Component Value Date     12/31/2015    K 3.7 12/05/2022     12/05/2022    CO2 26 12/05/2022    ANIONGAP 8 12/31/2015    BUN 10 12/05/2022    CREATININE 1.05 12/05/2022    GLUCOSE 120 12/31/2015    GLUF 103 (H) 12/05/2022    CALCIUM 9.3 12/05/2022    CORRECTEDCA 9.9 12/05/2022    AST 22 12/05/2022    ALT 17 12/05/2022    ALKPHOS 60 12/05/2022    EGFR 55 12/05/2022           Imaging: No results found. Allergies: Allergies   Allergen Reactions   • Percocet [Oxycodone-Acetaminophen] Hives   • Amlodipine    • Percocet  [Oxycodone-Acetaminophen]      Current Medications: Reviewed  PMH/FH/SH:  Reviewed      Physical Exam:    1.94 meters squared    Ht Readings from Last 3 Encounters:   10/10/23 5' 3" (1.6 m)   11/10/22 5' 3" (1.6 m)   08/24/22 5' 3" (1.6 m)        Wt Readings from Last 3 Encounters:   10/10/23 91.2 kg (201 lb)   11/10/22 91.4 kg (201 lb 9.6 oz)   08/24/22 90.4 kg (199 lb 6.4 oz)        Temp Readings from Last 3 Encounters:   10/10/23 98 °F (36.7 °C) (Temporal)   11/10/22 98.3 °F (36.8 °C) (Tympanic)   08/24/22 97.5 °F (36.4 °C) (Temporal)        BP Readings from Last 3 Encounters:   10/10/23 130/90   11/10/22 132/86   08/24/22 130/90             Physical Exam  Constitutional:       Appearance: She is well-developed.    HENT:      Head: Normocephalic and atraumatic. Cardiovascular:      Rate and Rhythm: Normal rate and regular rhythm. Heart sounds: Normal heart sounds. No murmur heard. Pulmonary:      Effort: Pulmonary effort is normal. No respiratory distress. Breath sounds: Normal breath sounds. Comments: Left sided rales  Abdominal:      General: Abdomen is flat. Palpations: Abdomen is soft. Skin:     General: Skin is warm and dry. Neurological:      Mental Status: She is alert.    Psychiatric:         Behavior: Behavior normal.       Emergency Contacts:    Extended Emergency Contact Information  Primary Emergency Contact: Natasha Charles  Mobile Phone: [phone]  Relation: Daughter

## 2023-10-18 DIAGNOSIS — F32.A DEPRESSION, UNSPECIFIED DEPRESSION TYPE: ICD-10-CM

## 2023-10-18 DIAGNOSIS — I63.412 CEREBROVASCULAR ACCIDENT (CVA) DUE TO EMBOLISM OF LEFT MIDDLE CEREBRAL ARTERY (HCC): ICD-10-CM

## 2023-10-18 DIAGNOSIS — M48.061 SPINAL STENOSIS OF LUMBAR REGION, UNSPECIFIED WHETHER NEUROGENIC CLAUDICATION PRESENT: ICD-10-CM

## 2023-10-18 RX ORDER — FLUOXETINE 10 MG/1
CAPSULE ORAL
Qty: 90 CAPSULE | Refills: 1 | Status: SHIPPED | OUTPATIENT
Start: 2023-10-18

## 2023-10-18 RX ORDER — FLUOXETINE HYDROCHLORIDE 20 MG/1
CAPSULE ORAL
Qty: 90 CAPSULE | Refills: 1 | Status: SHIPPED | OUTPATIENT
Start: 2023-10-18

## 2023-10-18 RX ORDER — ATORVASTATIN CALCIUM 40 MG/1
TABLET, FILM COATED ORAL
Qty: 90 TABLET | Refills: 1 | Status: SHIPPED | OUTPATIENT
Start: 2023-10-18

## 2023-10-19 DIAGNOSIS — I82.509 CHRONIC DEEP VEIN THROMBOSIS (DVT) OF LOWER EXTREMITY, UNSPECIFIED LATERALITY, UNSPECIFIED VEIN (HCC): ICD-10-CM

## 2023-10-19 RX ORDER — GABAPENTIN 300 MG/1
300 CAPSULE ORAL 3 TIMES DAILY
Qty: 90 CAPSULE | Refills: 0 | Status: SHIPPED | OUTPATIENT
Start: 2023-10-19

## 2023-10-22 RX ORDER — DABIGATRAN ETEXILATE 75 MG/1
CAPSULE ORAL
Qty: 60 CAPSULE | Refills: 2 | Status: SHIPPED | OUTPATIENT
Start: 2023-10-22

## 2023-12-12 ENCOUNTER — TELEPHONE (OUTPATIENT)
Dept: FAMILY MEDICINE CLINIC | Facility: CLINIC | Age: 68
End: 2023-12-12

## 2023-12-13 ENCOUNTER — OFFICE VISIT (OUTPATIENT)
Dept: FAMILY MEDICINE CLINIC | Facility: CLINIC | Age: 68
End: 2023-12-13
Payer: COMMERCIAL

## 2023-12-13 ENCOUNTER — RA CDI HCC (OUTPATIENT)
Dept: OTHER | Facility: HOSPITAL | Age: 68
End: 2023-12-13

## 2023-12-13 VITALS
WEIGHT: 200 LBS | DIASTOLIC BLOOD PRESSURE: 82 MMHG | RESPIRATION RATE: 18 BRPM | TEMPERATURE: 99.2 F | OXYGEN SATURATION: 97 % | BODY MASS INDEX: 35.44 KG/M2 | HEART RATE: 98 BPM | SYSTOLIC BLOOD PRESSURE: 112 MMHG | HEIGHT: 63 IN

## 2023-12-13 DIAGNOSIS — E55.9 VITAMIN D DEFICIENCY: ICD-10-CM

## 2023-12-13 DIAGNOSIS — K51.90 ULCERATIVE COLITIS WITHOUT COMPLICATIONS, UNSPECIFIED LOCATION (HCC): ICD-10-CM

## 2023-12-13 DIAGNOSIS — Z00.00 MEDICARE ANNUAL WELLNESS VISIT, SUBSEQUENT: Primary | ICD-10-CM

## 2023-12-13 DIAGNOSIS — Z78.0 POSTMENOPAUSAL: ICD-10-CM

## 2023-12-13 DIAGNOSIS — I50.9 HEART FAILURE, UNSPECIFIED HF CHRONICITY, UNSPECIFIED HEART FAILURE TYPE (HCC): ICD-10-CM

## 2023-12-13 DIAGNOSIS — E78.5 DYSLIPIDEMIA: ICD-10-CM

## 2023-12-13 DIAGNOSIS — Z87.891 FORMER SMOKER: ICD-10-CM

## 2023-12-13 DIAGNOSIS — I26.99 BILATERAL PULMONARY EMBOLISM (HCC): ICD-10-CM

## 2023-12-13 DIAGNOSIS — Z13.89 SCREENING FOR GENITOURINARY CONDITION: ICD-10-CM

## 2023-12-13 DIAGNOSIS — Z13.29 SCREENING FOR THYROID DISORDER: ICD-10-CM

## 2023-12-13 DIAGNOSIS — Z12.31 VISIT FOR SCREENING MAMMOGRAM: ICD-10-CM

## 2023-12-13 DIAGNOSIS — N18.30 STAGE 3 CHRONIC KIDNEY DISEASE, UNSPECIFIED WHETHER STAGE 3A OR 3B CKD (HCC): ICD-10-CM

## 2023-12-13 DIAGNOSIS — F33.41 RECURRENT MAJOR DEPRESSIVE DISORDER, IN PARTIAL REMISSION (HCC): ICD-10-CM

## 2023-12-13 DIAGNOSIS — Z23 ENCOUNTER FOR IMMUNIZATION: ICD-10-CM

## 2023-12-13 PROCEDURE — 99214 OFFICE O/P EST MOD 30 MIN: CPT | Performed by: INTERNAL MEDICINE

## 2023-12-13 PROCEDURE — G0008 ADMIN INFLUENZA VIRUS VAC: HCPCS

## 2023-12-13 PROCEDURE — 90662 IIV NO PRSV INCREASED AG IM: CPT

## 2023-12-13 PROCEDURE — G0439 PPPS, SUBSEQ VISIT: HCPCS | Performed by: INTERNAL MEDICINE

## 2023-12-13 NOTE — PROGRESS NOTES
[address] [facility] coding opportunities     I13.0     Chart Reviewed number of suggestions sent to Provider: 1     Patients Insurance     Medicare Insurance: [facility]

## 2023-12-13 NOTE — ASSESSMENT & PLAN NOTE
Wt Readings from Last 3 Encounters:   12/13/23 90.7 kg (200 lb)   10/10/23 91.2 kg (201 lb)   11/10/22 91.4 kg (201 lb 9.6 oz)       Euvolemic

## 2023-12-13 NOTE — PROGRESS NOTES
Assessment and Plan:     Problem List Items Addressed This Visit       Dyslipidemia    Relevant Orders    Comprehensive metabolic panel    Lipid panel    Vitamin D deficiency    Relevant Orders    Vitamin D 25 hydroxy    Ulcerative colitis ([address])     In remission         Former smoker (Chronic)    Relevant Orders    CT lung screening program    Pulmonary hypertension ([address]) (Chronic)     Wt Readings from Last 3 Encounters:   12/13/23 90.7 kg (200 lb)   10/10/23 91.2 kg (201 lb)   11/10/22 91.4 kg (201 lb 9.6 oz)       Euvolemic             Major depressive disorder, recurrent, unspecified ([address])     Stable continue current regimen         Stage 3 chronic kidney disease, unspecified whether stage 3a or 3b CKD ([address])    Relevant Orders    CBC and differential    Comprehensive metabolic panel    Bilateral pulmonary embolism ([address])     On Pradaxa          Other Visit Diagnoses       Medicare annual wellness visit, subsequent    -  Primary    Screening for thyroid disorder        Relevant Orders    TSH, 3rd generation with Free T4 reflex    Screening for genitourinary condition        Relevant Orders    Urinalysis with microscopic    Postmenopausal        Relevant Orders    DXA bone density spine hip and pelvis    Visit for screening mammogram        Relevant Orders    Mammo screening bilateral w 3d & cad    Encounter for immunization        Relevant Orders    FLUZONE HIGH-DOSE: influenza vaccine, high-dose, preservative-free 0.7 mL (Completed)             Preventive health issues were discussed with patient, and age appropriate screening tests were ordered as noted in patient's After Visit Summary. Personalized health advice and appropriate referrals for health education or preventive services given if needed, as noted in patient's After Visit Summary.      History of Present Illness:     Patient presents for a Medicare Wellness Visit  Patient was seen here about a year ago with history of hypertension hyperlipidemia prothrombin mutation CVA PE after COVID-pneumonia is here to follow-up on chronic medical problems. She is followed regular with hematology and is on Pradaxa 75 mg twice a day. She had a colonoscopy in 2021 I was asked to go for CT colonoscopy however has not carried out. Patient is overdue for mammogram.  DEXA scan would also be ordered. Labs are due. Patient has quit smoking. She is due for CTs scan will be ordered today.                Patient Care Team:  Devan Ramos MD as PCP - General  Apolonia Nephleonard, MD Lul Garduno MD Allayne Millard, MD as Endoscopist     Review of Systems:     Review of Systems     Problem List:     Patient Active Problem List   Diagnosis    Osteoarthritis of left hip    Benign essential hypertension    Chronic insomnia    Depressive disorder    Dyslipidemia    Dyspepsia    Gastroesophageal reflux disease without esophagitis    Hip arthritis    Hyperlipidemia    Irritable bowel syndrome    Hypercoagulable state ([address])    Spinal stenosis of lumbar region    Vitamin D deficiency    Prothrombin [id number] mutation ([address])    History of CVA (cerebrovascular accident)    Hx of pulmonary embolus    Obesity, morbid ([address])    COVID-19 virus infection    Ulcerative colitis ([address])    Former smoker    Shortness of breath    Pulmonary hypertension ([address])    PFO (patent foramen ovale)    Major depressive disorder, recurrent, unspecified (HCC)    CLEM (obstructive sleep apnea)    Stage 3 chronic kidney disease, unspecified whether stage 3a or 3b CKD ([address])    Bilateral pulmonary embolism ([address])      Past Medical and Surgical History:     Past Medical History:   Diagnosis Date    Acid reflux     Anxiety     Arthritis     Back pain     Carpal tunnel syndrome, bilateral     Depression     Endometrial cancer ([address]) 1997    Hiatal hernia     History of DVT of lower extremity     Right leg-had IVc filter  removal today-6/6/2016    History of pulmonary embolism     History of uterine cancer     Was stage 3- uterine, ovaries-hysterectomy    Hypercholesteremia     Hypertension     Left leg pain     Lumbar herniated disc     Tumor cells, benign     In right atrium. Probably congenital.    Varicose vein of leg     Wears glasses      Past Surgical History:   Procedure Laterality Date    COLONOSCOPY      DENTAL SURGERY      DILATION AND CURETTAGE OF UTERUS      ESOPHAGOGASTRODUODENOSCOPY      FOOT SURGERY      LAST ASSESSED: [id number]    HYSTERECTOMY  1997    uterine cancer-hx    IR STROKE ALERT  10/20/2021    JOINT REPLACEMENT      b/l   THR    OOPHORECTOMY Bilateral 1997    [place] ARTHRP ACETBLR/PROX FEM PROSTC AGRFT/ALGRFT Left 4/13/2016    Procedure: ARTHROPLASTY HIP TOTAL;  Surgeon: Keyonna Maldonado DO;  Location: [facility];  Service: Orthopedics    [place] COLONOSCOPY FLX DX W/COLLJ SPEC WHEN PFRMD N/A 6/14/2017    Procedure: EGD AND COLONOSCOPY;  Surgeon: Bekah Avery MD;  Location: [facility]; Service: Gastroenterology    TONSILLECTOMY AND ADENOIDECTOMY      TOTAL HIP ARTHROPLASTY Right     LAST ASSESSED: [id number]      Family History:     Family History   Problem Relation Age of Onset    Heart failure Father     Colon cancer Father 62    Arthritis Family     No Known Problems Sister     No Known Problems Daughter     No Known Problems Maternal Grandmother     No Known Problems Maternal Grandfather     No Known Problems Paternal Grandmother     No Known Problems Paternal Grandfather     No Known Problems Sister     No Known Problems Maternal Aunt     No Known Problems Paternal Aunt       Social History:     Social History     Socioeconomic History    Marital status:       Spouse name: None    Number of children: 1    Years of education: None    Highest education level: None   Occupational History    Occupation: retired   Tobacco Use    Smoking status: Former     Current packs/day: 0.00     Average packs/day: 0.5 packs/day for 50.0 years (25.0 ttl pk-yrs)     Types: Cigarettes     Start date: 1972     Quit date: 2022     Years since quittin.8    Smokeless tobacco: Never   Vaping Use    Vaping status: Never Used   Substance and Sexual Activity    Alcohol use: Yes     Comment: 2 or 3 monthly    Drug use: No    Sexual activity: Not Currently     Partners: Male   Other Topics Concern    None   Social History Narrative    Coffee    Daily caffeinated cola consumption     Social Determinants of Health     Financial Resource Strain: Low Risk  (2023)    Overall Financial Resource Strain (CARDIA)     Difficulty of Paying Living Expenses: Not hard at all   Food Insecurity: No Food Insecurity (2022)    Hunger Vital Sign     Worried About Running Out of Food in the Last Year: Never true     Ran Out of Food in the Last Year: Never true   Transportation Needs: No Transportation Needs (2023)    PRAPARE - Transportation     Lack of Transportation (Medical): No     Lack of Transportation (Non-Medical):  No   Physical Activity: Not on file   Stress: Not on file   Social Connections: Not on file   Intimate Partner Violence: Not on file   Housing Stability: Low Risk  (2022)    Housing Stability Vital Sign     Unable to Pay for Housing in the Last Year: No     Number of Places Lived in the Last Year: 1     Unstable Housing in the Last Year: No      Medications and Allergies:     Current Outpatient Medications   Medication Sig Dispense Refill    acetaminophen (TYLENOL) 325 mg tablet Take 2 tablets (650 mg total) by mouth every 6 (six) hours as needed for fever  0    atorvastatin (LIPITOR) 40 mg tablet TAKE 1 TABLET BY MOUTH EVERY DAY IN THE EVENING 90 tablet 1    cyclobenzaprine (FLEXERIL) 5 mg tablet Take 1 tablet (5 mg total) by mouth 3 (three) times a day as needed for muscle spasms (Patient not taking: Reported on 2022) 20 tablet 0    dabigatran etexilate (PRADAXA) 75 mg capsule TAKE 1 CAPSULE (75 MG TOTAL) BY MOUTH EVERY 12 HOURS 60 capsule 2    ergocalciferol (VITAMIN D2) 50,000 units Take 1 capsule (50,000 Units total) by mouth once a week 8 capsule 0    FLUoxetine (PROzac) 10 mg capsule TAKE ONE CAPSULE (10 MG TOTAL) DAILY ALONG WITH 20 MG CAPSULE DAILY TO TOTAL 30 MG. 90 capsule 1    FLUoxetine (PROzac) 20 mg capsule TAKE 1 TAB WITH A 10MG TAB DAILY TO EQUAL 30MG DAILY. 90 capsule 1    gabapentin (NEURONTIN) 300 mg capsule Take 1 capsule (300 mg total) by mouth 3 (three) times a day 90 capsule 0    pantoprazole (PROTONIX) 20 mg tablet TAKE 1 TABLET BY MOUTH EVERY DAY 90 tablet 1     No current facility-administered medications for this visit.      Allergies   Allergen Reactions    Percocet [Oxycodone-Acetaminophen] Hives    Amlodipine     Percocet  [Oxycodone-Acetaminophen]       Immunizations:     Immunization History   Administered Date(s) Administered    COVID-19 PFIZER VACCINE 0.3 ML IM 04/01/2021, 04/22/2021    COVID-19 Pfizer vac (Chau-sucrose, gray cap) 12 yr+ IM 02/12/2022    INFLUENZA 09/01/2011, 11/01/2014, 10/22/2015, 10/22/2015, 11/01/2016, 11/01/2016, 03/21/2017, 01/27/2022, 11/10/2022    Influenza Quadrivalent Preservative Free 3 years and older IM 03/21/2017, 11/20/2017    Influenza, high dose seasonal 0.7 mL 10/21/2021, 11/10/2022, 12/13/2023    Influenza, injectable, quadrivalent, preservative free 0.5 mL 09/12/2020    Influenza, recombinant, quadrivalent,injectable, preservative free 10/10/2018    Pneumococcal Conjugate 13-Valent 03/21/2017    Pneumococcal Conjugate Vaccine 20-valent (Pcv20), Polysace 11/10/2022    Pneumococcal Polysaccharide PPV23 01/01/2011    Zoster 10/22/2015, 01/01/2017      Health Maintenance:         Topic Date Due    Lung Cancer Screening  01/21/2023    Breast Cancer Screening: Mammogram  03/01/2023    Colorectal Cancer Screening  06/10/2025    Hepatitis C Screening  Completed         Topic Date Due    COVID-19 Vaccine (4 - 2023-24 season) 09/01/2023      Medicare Screening Tests and Risk Assessments:     Chauncey Galindo is here for her Subsequent Wellness visit. Health Risk Assessment:   Patient rates overall health as good. Patient feels that their physical health rating is slightly better. Patient is satisfied with their life. Eyesight was rated as slightly worse. Hearing was rated as same. Patient feels that their emotional and mental health rating is slightly better. Patients states they are never, rarely angry. Patient states they are sometimes unusually tired/fatigued. Pain experienced in the last 7 days has been some. Patient's pain rating has been 6/10. Patient states that she has experienced no weight loss or gain in last 6 months. Depression Screening:   PHQ-9 Score: 1      Fall Risk Screening: In the past year, patient has experienced: history of falling in past year      Urinary Incontinence Screening:   Patient has leaked urine accidently in the last six months. Home Safety:  Patient has trouble with stairs inside or outside of their home. Patient has working smoke alarms and has working carbon monoxide detector. Home safety hazards include: none. Nutrition:   Current diet is Regular. Medications:   Patient is currently taking over-the-counter supplements. OTC medications include: see medication list. Patient is able to manage medications. Activities of Daily Living (ADLs)/Instrumental Activities of Daily Living (IADLs):   Walk and transfer into and out of bed and chair?: Yes  Dress and groom yourself?: Yes    Bathe or shower yourself?: Yes    Feed yourself? Yes  Do your laundry/housekeeping?: No  Manage your money, pay your bills and track your expenses?: Yes  Make your own meals?: Yes    Do your own shopping?: Yes    Previous Hospitalizations:   Any hospitalizations or ED visits within the last 12 months?: No      Advance Care Planning:   Living will: Yes    Durable POA for healthcare:  Yes    Advanced directive: Yes      Cognitive Screening:   Provider or family/friend/caregiver concerned regarding cognition?: No    PREVENTIVE SCREENINGS      Cardiovascular Screening:    General: Screening Not Indicated and History Lipid Disorder    Due for: Lipid Panel      Diabetes Screening:       Due for: Blood Glucose      Colorectal Cancer Screening:     General: Screening Current    Due for: Colonoscopy - High Risk      Breast Cancer Screening:     General: Screening Current    Due for: Mammogram        Cervical Cancer Screening:    General: Screening Not Indicated      Osteoporosis Screening:    General: Patient Declines      Abdominal Aortic Aneurysm (AAA) Screening:        General: Screening Not Indicated      Lung Cancer Screening:       Due for: Low Dose CT (LDCT)      Hepatitis C Screening:    General: Screening Current    Screening, Brief Intervention, and Referral to Treatment (SBIRT)    Screening  Typical number of drinks in a day: 1  Typical number of drinks in a week: 3  Interpretation: Low risk drinking behavior. AUDIT-C Screenin) How often did you have a drink containing alcohol in the past year? 2 to 4 times a month  2) How many drinks did you have on a typical day when you were drinking in the past year? 3 to 4  3) How often did you have 6 or more drinks on one occasion in the past year? never    AUDIT-C Score: 2  Interpretation: Score 0-2 (female): Negative screen for alcohol misuse    Single Item Drug Screening:  How often have you used an illegal drug (including marijuana) or a prescription medication for non-medical reasons in the past year? never    Single Item Drug Screen Score: 0  Interpretation: Negative screen for possible drug use disorder    No results found. Physical Exam:     /82   Pulse 98   Temp 99.2 °F (37.3 °C)   Resp 18   Ht 5' 3" (1.6 m)   Wt 90.7 kg (200 lb)   SpO2 97%   BMI 35.43 kg/m²     Physical Exam  Cardiovascular:      Rate and Rhythm: Normal rate and regular rhythm. Heart sounds: Murmur heard.    Pulmonary:      Effort: Pulmonary effort is normal. No respiratory distress. Breath sounds: Normal breath sounds. No wheezing or rales. Musculoskeletal:      Right lower leg: No edema. Left lower leg: No edema. Neurological:      Mental Status: She is alert.       Comments:  Itz Hurst assisted ambulation          Latasha Mcmahan MD

## 2023-12-27 DIAGNOSIS — M48.061 SPINAL STENOSIS OF LUMBAR REGION, UNSPECIFIED WHETHER NEUROGENIC CLAUDICATION PRESENT: ICD-10-CM

## 2023-12-27 DIAGNOSIS — R10.13 DYSPEPSIA: ICD-10-CM

## 2023-12-28 RX ORDER — GABAPENTIN 300 MG/1
300 CAPSULE ORAL 3 TIMES DAILY
Qty: 90 CAPSULE | Refills: 0 | Status: SHIPPED | OUTPATIENT
Start: 2023-12-28

## 2023-12-28 RX ORDER — PANTOPRAZOLE SODIUM 20 MG/1
TABLET, DELAYED RELEASE ORAL
Qty: 90 TABLET | Refills: 1 | Status: SHIPPED | OUTPATIENT
Start: 2023-12-28

## 2024-01-11 ENCOUNTER — TELEPHONE (OUTPATIENT)
Dept: HEMATOLOGY ONCOLOGY | Facility: CLINIC | Age: 69
End: 2024-01-11

## 2024-01-11 NOTE — TELEPHONE ENCOUNTER
Per Dr. Usman Polanco 24 hours prior to dental extractions. VM left for patient. Clearance form sent to dentist.

## 2024-03-10 DIAGNOSIS — M48.061 SPINAL STENOSIS OF LUMBAR REGION, UNSPECIFIED WHETHER NEUROGENIC CLAUDICATION PRESENT: ICD-10-CM

## 2024-03-11 RX ORDER — GABAPENTIN 300 MG/1
300 CAPSULE ORAL 3 TIMES DAILY
Qty: 90 CAPSULE | Refills: 0 | Status: SHIPPED | OUTPATIENT
Start: 2024-03-11

## 2024-04-23 DIAGNOSIS — I82.509 CHRONIC DEEP VEIN THROMBOSIS (DVT) OF LOWER EXTREMITY, UNSPECIFIED LATERALITY, UNSPECIFIED VEIN (HCC): ICD-10-CM

## 2024-04-23 RX ORDER — DABIGATRAN ETEXILATE 75 MG/1
CAPSULE ORAL
Qty: 60 CAPSULE | Refills: 2 | Status: SHIPPED | OUTPATIENT
Start: 2024-04-23 | End: 2024-04-23 | Stop reason: SDUPTHER

## 2024-04-24 ENCOUNTER — TELEPHONE (OUTPATIENT)
Dept: HEMATOLOGY ONCOLOGY | Facility: CLINIC | Age: 69
End: 2024-04-24

## 2024-04-24 DIAGNOSIS — R10.13 DYSPEPSIA: ICD-10-CM

## 2024-04-24 DIAGNOSIS — F32.A DEPRESSION, UNSPECIFIED DEPRESSION TYPE: ICD-10-CM

## 2024-04-24 DIAGNOSIS — I82.509 CHRONIC DEEP VEIN THROMBOSIS (DVT) OF LOWER EXTREMITY, UNSPECIFIED LATERALITY, UNSPECIFIED VEIN (HCC): ICD-10-CM

## 2024-04-24 DIAGNOSIS — M48.061 SPINAL STENOSIS OF LUMBAR REGION, UNSPECIFIED WHETHER NEUROGENIC CLAUDICATION PRESENT: ICD-10-CM

## 2024-04-24 DIAGNOSIS — I63.412 CEREBROVASCULAR ACCIDENT (CVA) DUE TO EMBOLISM OF LEFT MIDDLE CEREBRAL ARTERY (HCC): ICD-10-CM

## 2024-04-24 RX ORDER — DABIGATRAN ETEXILATE 75 MG/1
75 CAPSULE ORAL EVERY 12 HOURS SCHEDULED
Qty: 60 CAPSULE | Refills: 3 | Status: SHIPPED | OUTPATIENT
Start: 2024-04-24 | End: 2024-04-24

## 2024-04-24 RX ORDER — PANTOPRAZOLE SODIUM 20 MG/1
TABLET, DELAYED RELEASE ORAL
Qty: 90 TABLET | Refills: 1 | Status: SHIPPED | OUTPATIENT
Start: 2024-04-24

## 2024-04-24 RX ORDER — DABIGATRAN ETEXILATE 75 MG/1
CAPSULE ORAL
Qty: 60 CAPSULE | Refills: 3 | Status: SHIPPED | OUTPATIENT
Start: 2024-04-24 | End: 2024-04-29 | Stop reason: SDUPTHER

## 2024-04-24 NOTE — TELEPHONE ENCOUNTER
Patient Call    Who are you speaking with? Patient    If it is not the patient, are they listed on an active communication consent form? N/A   What is the reason for this call? Pt is calling in regards to her   dabigatran etexilate (PRADAXA) 75 mg capsule. I informed pt that the medication was sent to the pharmacy this morning. Pt will call pharmacy and have them call us if they have not received the prescription. No further needs at this time.    Does this require a call back? No   If a call back is required, please list best call back number N/A   If a call back is required, advise that a message will be forwarded to their care team and someone will return their call as soon as possible.   Did you relay this information to the patient? N/A

## 2024-04-25 RX ORDER — GABAPENTIN 300 MG/1
300 CAPSULE ORAL 3 TIMES DAILY
Qty: 90 CAPSULE | Refills: 0 | Status: SHIPPED | OUTPATIENT
Start: 2024-04-25

## 2024-04-25 RX ORDER — ATORVASTATIN CALCIUM 40 MG/1
TABLET, FILM COATED ORAL
Qty: 30 TABLET | Refills: 0 | Status: SHIPPED | OUTPATIENT
Start: 2024-04-25

## 2024-04-25 RX ORDER — FLUOXETINE HYDROCHLORIDE 20 MG/1
CAPSULE ORAL
Qty: 90 CAPSULE | Refills: 1 | Status: SHIPPED | OUTPATIENT
Start: 2024-04-25

## 2024-04-25 NOTE — TELEPHONE ENCOUNTER
Patient needs updated blood work for Cmp and lipid panel and has previously placed orders. Please contact patient to go for labs. Courtesy refill provided.

## 2024-04-29 ENCOUNTER — TELEPHONE (OUTPATIENT)
Dept: HEMATOLOGY ONCOLOGY | Facility: CLINIC | Age: 69
End: 2024-04-29

## 2024-04-29 DIAGNOSIS — I82.509 CHRONIC DEEP VEIN THROMBOSIS (DVT) OF LOWER EXTREMITY, UNSPECIFIED LATERALITY, UNSPECIFIED VEIN (HCC): Primary | ICD-10-CM

## 2024-04-29 RX ORDER — DABIGATRAN ETEXILATE 75 MG/1
CAPSULE ORAL
Qty: 60 CAPSULE | Refills: 3 | Status: SHIPPED | OUTPATIENT
Start: 2024-04-29 | End: 2024-04-30 | Stop reason: SDUPTHER

## 2024-04-29 NOTE — TELEPHONE ENCOUNTER
Medication Refill Request   Who are you speaking with? Patient   If it is not the patient, are they listed on an active communication consent form?     N/A   Which medication is being requested for refill?  Please list medication name and dosage  Pradaxa 75mg   How many pills does the patient have left?  0   Preferred Pharmacy / Address  [id number] - [place] - [address]  [address] [place] [address]  Phone: [phone]  Fax: [phone]    Who is the prescribing provider? Dr. Mary   Call back number  [phone]    Relevant Information  na

## 2024-04-29 NOTE — TELEPHONE ENCOUNTER
"Patient Call    Who are you speaking with? Pharmacy   [facility]   If it is not the patient, are they listed on an active communication consent form? N/A   What is the reason for this call? [facility] calling in regards to patient's medication dabigatran etexilate (PRADAXA) 75 mg capsule.  [facility] would like a new script sent to the pharmacy for \"Brand Necessary.\"  The generic of this medication is on back order at this time.  [facility] states that if the new script is sent for brand necessary that the medication will need a prior authorization.  [facility] would like a call back to discuss.    Does this require a call back? Yes   If a call back is required, please list best call back number [phone]   If a call back is required, advise that a message will be forwarded to their care team and someone will return their call as soon as possible.   Did you relay this information to the patient? Yes     "

## 2024-04-30 ENCOUNTER — TELEPHONE (OUTPATIENT)
Dept: HEMATOLOGY ONCOLOGY | Facility: CLINIC | Age: 69
End: 2024-04-30

## 2024-04-30 DIAGNOSIS — I82.509 CHRONIC DEEP VEIN THROMBOSIS (DVT) OF LOWER EXTREMITY, UNSPECIFIED LATERALITY, UNSPECIFIED VEIN (HCC): ICD-10-CM

## 2024-04-30 RX ORDER — DABIGATRAN ETEXILATE 75 MG/1
CAPSULE ORAL
Qty: 60 CAPSULE | Refills: 3 | Status: SHIPPED | OUTPATIENT
Start: 2024-04-30

## 2024-04-30 NOTE — TELEPHONE ENCOUNTER
Pharmacy just called our office yesterday in regards to being back ordered and needing to get authorization for brand name. Separate telephone encounter with oral chemotherapy team who is working on authorization. VM left for patient advising her we were just notified of this yesterday and are currently working on it. Call back number provided.

## 2024-04-30 NOTE — TELEPHONE ENCOUNTER
Per Dr. Mary patient can take Xarelto 20 mg once daily until approved.  left for patient requesting a call back to discuss. Call back number provided.

## 2024-04-30 NOTE — TELEPHONE ENCOUNTER
"Received attached VM via teams:  \"Leann Charles [birth year]. I would like to see if [facility] could [facility] or the prior authorization for YISSEL 75 milligram twice a day. I have one last and I've been trying to get this for like 5 days now. It shouldn't be this hard. Please help me. Thank you.\"    "

## 2024-05-01 NOTE — TELEPHONE ENCOUNTER
Per E-mail from [facility], pradaxa has been approved. Pt called and notified she can now fill her medication. Verified filling pharmacy. Pt was appreciative of the call.

## 2024-05-07 ENCOUNTER — VBI (OUTPATIENT)
Dept: ADMINISTRATIVE | Facility: OTHER | Age: 69
End: 2024-05-07

## 2024-06-01 DIAGNOSIS — M48.061 SPINAL STENOSIS OF LUMBAR REGION, UNSPECIFIED WHETHER NEUROGENIC CLAUDICATION PRESENT: ICD-10-CM

## 2024-06-03 RX ORDER — GABAPENTIN 300 MG/1
300 CAPSULE ORAL 3 TIMES DAILY
Qty: 90 CAPSULE | Refills: 0 | Status: SHIPPED | OUTPATIENT
Start: 2024-06-03

## 2024-06-07 ENCOUNTER — RA CDI HCC (OUTPATIENT)
Dept: OTHER | Facility: HOSPITAL | Age: 69
End: 2024-06-07

## 2024-06-12 ENCOUNTER — TELEPHONE (OUTPATIENT)
Dept: ADMINISTRATIVE | Facility: OTHER | Age: 69
End: 2024-06-12

## 2024-06-12 ENCOUNTER — RA CDI HCC (OUTPATIENT)
Dept: OTHER | Facility: HOSPITAL | Age: 69
End: 2024-06-12

## 2024-06-12 ENCOUNTER — TELEPHONE (OUTPATIENT)
Dept: SURGICAL ONCOLOGY | Facility: CLINIC | Age: 69
End: 2024-06-12

## 2024-06-12 PROBLEM — U07.1 COVID-19 VIRUS INFECTION: Chronic | Status: RESOLVED | Noted: 2022-01-28 | Resolved: 2024-06-12

## 2024-06-12 NOTE — TELEPHONE ENCOUNTER
Pt's appt needs to be rescheduled d/t provider not being in the office. Attempted to reach patient about need of appointment change with no success. Appointment canceled and detailed VM left with [facility] number [phone] for patient to return call to reschedule.     Additional message sent Via [employer].

## 2024-06-12 NOTE — TELEPHONE ENCOUNTER
06/12/24 9:09 AM    Patient contacted to bring Advance Directive, POLST, or Living Will document to next scheduled pcp visit.[facility] Department left message.    Thank you.  Nicole Pastrana MA  [facility]

## 2024-06-13 NOTE — PROGRESS NOTES
HCC coding opportunities     I13.0, N18.31, I27.20     Chart Reviewed number of suggestions sent to Provider: 3     GR    Patients Insurance     Medicare Insurance: [employer]

## 2024-06-20 ENCOUNTER — RA CDI HCC (OUTPATIENT)
Dept: OTHER | Facility: HOSPITAL | Age: 69
End: 2024-06-20

## 2024-06-26 RX ORDER — ATENOLOL 100 MG/1
TABLET ORAL
Qty: 180 CAPSULE | Refills: 0 | OUTPATIENT
Start: 2024-06-26

## 2024-07-10 ENCOUNTER — TELEPHONE (OUTPATIENT)
Dept: RADIOLOGY | Facility: HOSPITAL | Age: 69
End: 2024-07-10

## 2024-07-10 NOTE — TELEPHONE ENCOUNTER
Called patient to schedule for CT Lung Screening. Patient did not anwser, left a message for a call back

## 2024-07-17 DIAGNOSIS — M48.061 SPINAL STENOSIS OF LUMBAR REGION, UNSPECIFIED WHETHER NEUROGENIC CLAUDICATION PRESENT: ICD-10-CM

## 2024-07-18 RX ORDER — GABAPENTIN 300 MG/1
300 CAPSULE ORAL 3 TIMES DAILY
Qty: 90 CAPSULE | Refills: 1 | Status: SHIPPED | OUTPATIENT
Start: 2024-07-18

## 2024-08-09 DIAGNOSIS — I63.412 CEREBROVASCULAR ACCIDENT (CVA) DUE TO EMBOLISM OF LEFT MIDDLE CEREBRAL ARTERY (HCC): ICD-10-CM

## 2024-08-09 RX ORDER — ATORVASTATIN CALCIUM 40 MG/1
TABLET, FILM COATED ORAL
Qty: 90 TABLET | Refills: 1 | Status: SHIPPED | OUTPATIENT
Start: 2024-08-09

## 2024-08-19 DIAGNOSIS — I82.509 CHRONIC DEEP VEIN THROMBOSIS (DVT) OF LOWER EXTREMITY, UNSPECIFIED LATERALITY, UNSPECIFIED VEIN (HCC): ICD-10-CM

## 2024-08-19 RX ORDER — DABIGATRAN ETEXILATE MESYLATE 75 MG/1
CAPSULE ORAL
Qty: 60 CAPSULE | Refills: 3 | Status: SHIPPED | OUTPATIENT
Start: 2024-08-19

## 2024-08-28 ENCOUNTER — TELEPHONE (OUTPATIENT)
Dept: ADMINISTRATIVE | Facility: OTHER | Age: 69
End: 2024-08-28

## 2024-08-28 NOTE — TELEPHONE ENCOUNTER
08/28/24 11:28 AM    Patient contacted to bring Advance Directive, POLST, or Living Will document to next scheduled pcp visit.[facility] Department left message.    Thank you.  Ana Gruber MA  [facility]

## 2024-08-29 ENCOUNTER — OFFICE VISIT (OUTPATIENT)
Dept: FAMILY MEDICINE CLINIC | Facility: CLINIC | Age: 69
End: 2024-08-29
Payer: COMMERCIAL

## 2024-08-29 VITALS
DIASTOLIC BLOOD PRESSURE: 80 MMHG | OXYGEN SATURATION: 95 % | BODY MASS INDEX: 35.79 KG/M2 | WEIGHT: 202 LBS | TEMPERATURE: 99.9 F | HEART RATE: 100 BPM | HEIGHT: 63 IN | SYSTOLIC BLOOD PRESSURE: 125 MMHG

## 2024-08-29 DIAGNOSIS — F33.41 RECURRENT MAJOR DEPRESSIVE DISORDER, IN PARTIAL REMISSION (HCC): ICD-10-CM

## 2024-08-29 DIAGNOSIS — E66.01 OBESITY, MORBID (HCC): ICD-10-CM

## 2024-08-29 DIAGNOSIS — I50.9 HEART FAILURE, UNSPECIFIED HF CHRONICITY, UNSPECIFIED HEART FAILURE TYPE (HCC): ICD-10-CM

## 2024-08-29 DIAGNOSIS — N18.30 STAGE 3 CHRONIC KIDNEY DISEASE, UNSPECIFIED WHETHER STAGE 3A OR 3B CKD (HCC): ICD-10-CM

## 2024-08-29 DIAGNOSIS — Z86.711 HISTORY OF PULMONARY EMBOLISM: ICD-10-CM

## 2024-08-29 DIAGNOSIS — D68.59 HYPERCOAGULABLE STATE (HCC): ICD-10-CM

## 2024-08-29 DIAGNOSIS — K51.90 ULCERATIVE COLITIS WITHOUT COMPLICATIONS, UNSPECIFIED LOCATION (HCC): ICD-10-CM

## 2024-08-29 PROCEDURE — G2211 COMPLEX E/M VISIT ADD ON: HCPCS | Performed by: INTERNAL MEDICINE

## 2024-08-29 PROCEDURE — 99215 OFFICE O/P EST HI 40 MIN: CPT | Performed by: INTERNAL MEDICINE

## 2024-08-29 NOTE — PROGRESS NOTES
Assessment/Plan:           Problem List Items Addressed This Visit       Hypercoagulable state (HCC)     Prothrombin mutation.  Patient is on Pradaxa         Obesity, morbid (HCC)     Continue with diet modification         Ulcerative colitis (HCC)     No further flareups.  Patient will be following up with GI          Pulmonary hypertension (HCC) (Chronic)     Wt Readings from Last 3 Encounters:   08/29/24 91.6 kg (202 lb)   12/13/23 90.7 kg (200 lb)   10/10/23 91.2 kg (201 lb)       Euvolemic             Major depressive disorder, recurrent, unspecified (HCC)     Stable on Prozac 30 mg a day         Stage 3 chronic kidney disease, unspecified whether stage 3a or 3b CKD (HCC)     Lab Results   Component Value Date    EGFR 55 12/05/2022    EGFR 54 01/31/2022    EGFR 60 01/30/2022    CREATININE 1.05 12/05/2022    CREATININE 1.06 01/31/2022    CREATININE 0.98 01/30/2022   Patient is euvolemic.  Follow-up lab studies ordered         History of pulmonary embolism     Patient is on Pradaxa continue current regimen                  Subjective:      Patient ID: Leann Charles is a 69 y.o. female.    HPI  Patient is here to follow-up on chronic medical problems.  Patient has hypertension hyperlipidemia prothrombin mutation CVA 2021 status post tPA status post thrombectomy PE in the context of COVID and is now on Pradaxa.  She has not been in the office for several months.  She had been quite busy taking care of her mother.  Blood pressure is good she is compliant with medication.  Ambulates with a walker.  She has not smoked for over a year now.  She is due for follow-up CT scan of the chest.  Reports no shortness of breath.  Minimal cough.  She has not seen her cardiologist or her hematologist or pulmonologist lately.    She is scheduled for mammogram.  She will follow-up with GI for CT colonoscopy.  I discussed DEXA scan.  This will be ordered today.  Vaccinations up-to-date and she should get updated COVID as well as  "flu vaccine.  The following portions of the patient's history were reviewed and updated as appropriate: allergies, current medications, past family history, past medical history, past social history, past surgical history, and problem list.    Review of Systems      Objective:      /80 (BP Location: Left arm, Patient Position: Sitting, Cuff Size: Large)   Pulse 100   Temp 99.9 °F (37.7 °C) (Tympanic)   Ht 5' 3\" (1.6 m)   Wt 91.6 kg (202 lb)   SpO2 95%   BMI 35.78 kg/m²          Physical Exam  Cardiovascular:      Rate and Rhythm: Normal rate and regular rhythm.      Heart sounds: Normal heart sounds. No murmur heard.  Pulmonary:      Effort: Pulmonary effort is normal. No respiratory distress.      Breath sounds: Normal breath sounds. No wheezing or rales.   Musculoskeletal:      Right lower leg: No edema.      Left lower leg: No edema.   Neurological:      Mental Status: She is alert.      Comments: Cane assisted ambulation           Depression Screening Follow-up Plan: Patient's depression screening was positive with a PHQ-2 score of . Their PHQ-9 score was 0. Patient declines further evaluation by mental health professional and/or medications. They have no active suicidal ideations. Brief counseling provided and recommend additional follow-up/re-evaluation at next office visit.     Depression Screening and Follow-up Plan: Patient was screened for depression during today's encounter. They screened negative with a PHQ-9 score of 0.       "

## 2024-08-29 NOTE — ASSESSMENT & PLAN NOTE
Wt Readings from Last 3 Encounters:   08/29/24 91.6 kg (202 lb)   12/13/23 90.7 kg (200 lb)   10/10/23 91.2 kg (201 lb)       Euvolemic

## 2024-08-29 NOTE — ASSESSMENT & PLAN NOTE
Lab Results   Component Value Date    EGFR 55 12/05/2022    EGFR 54 01/31/2022    EGFR 60 01/30/2022    CREATININE 1.05 12/05/2022    CREATININE 1.06 01/31/2022    CREATININE 0.98 01/30/2022   Patient is euvolemic.  Follow-up lab studies ordered

## 2024-09-26 ENCOUNTER — VBI (OUTPATIENT)
Dept: ADMINISTRATIVE | Facility: OTHER | Age: 69
End: 2024-09-26

## 2024-09-26 NOTE — TELEPHONE ENCOUNTER
09/26/24 9:29 AM     Chart reviewed for Mammogram was/were not submitted to the patient's insurance.     Rita Mcgraw MA   [facility]

## 2024-10-08 ENCOUNTER — HOSPITAL ENCOUNTER (OUTPATIENT)
Dept: RADIOLOGY | Facility: HOSPITAL | Age: 69
Discharge: HOME | End: 2024-10-08
Payer: COMMERCIAL

## 2024-10-08 DIAGNOSIS — Z87.891 FORMER SMOKER: ICD-10-CM

## 2024-10-08 PROCEDURE — 71271 CT THORAX LUNG CANCER SCR C-: CPT

## 2024-10-10 ENCOUNTER — HOSPITAL ENCOUNTER (OUTPATIENT)
Dept: MAMMOGRAPHY | Facility: MEDICAL CENTER | Age: 69
Discharge: HOME | End: 2024-10-10
Payer: COMMERCIAL

## 2024-10-10 VITALS — WEIGHT: 202 LBS | HEIGHT: 63 IN | BODY MASS INDEX: 35.79 KG/M2

## 2024-10-10 DIAGNOSIS — Z12.31 VISIT FOR SCREENING MAMMOGRAM: ICD-10-CM

## 2024-10-10 PROCEDURE — 77063 BREAST TOMOSYNTHESIS BI: CPT

## 2024-10-10 PROCEDURE — 77067 SCR MAMMO BI INCL CAD: CPT

## 2024-10-11 ENCOUNTER — TELEPHONE (OUTPATIENT)
Age: 69
End: 2024-10-11

## 2024-10-15 ENCOUNTER — VBI (OUTPATIENT)
Dept: ADMINISTRATIVE | Facility: OTHER | Age: 69
End: 2024-10-15

## 2024-10-15 NOTE — TELEPHONE ENCOUNTER
10/15/24 9:58 AM     Chart reviewed for Mammogram was/were submitted to the patient's insurance.     Rita Mcgraw MA   [facility]

## 2024-12-06 DIAGNOSIS — I82.509 CHRONIC DEEP VEIN THROMBOSIS (DVT) OF LOWER EXTREMITY, UNSPECIFIED LATERALITY, UNSPECIFIED VEIN (HCC): ICD-10-CM

## 2024-12-06 RX ORDER — DABIGATRAN ETEXILATE MESYLATE 75 MG/1
75 CAPSULE ORAL EVERY 12 HOURS
Qty: 60 CAPSULE | Refills: 3 | Status: SHIPPED | OUTPATIENT
Start: 2024-12-06

## 2024-12-13 DIAGNOSIS — F32.A DEPRESSION, UNSPECIFIED DEPRESSION TYPE: ICD-10-CM

## 2024-12-13 RX ORDER — FLUOXETINE 10 MG/1
CAPSULE ORAL
Qty: 90 CAPSULE | Refills: 1 | Status: SHIPPED | OUTPATIENT
Start: 2024-12-13

## 2024-12-18 DIAGNOSIS — R10.13 DYSPEPSIA: ICD-10-CM

## 2024-12-18 RX ORDER — PANTOPRAZOLE SODIUM 20 MG/1
20 TABLET, DELAYED RELEASE ORAL DAILY
Qty: 90 TABLET | Refills: 1 | Status: SHIPPED | OUTPATIENT
Start: 2024-12-18

## 2025-01-02 ENCOUNTER — TELEPHONE (OUTPATIENT)
Age: 70
End: 2025-01-02

## 2025-01-02 NOTE — TELEPHONE ENCOUNTER
Patient has not seen Dr. Marin since August 2024.  Please call her for follow-up along with her Medicare wellness.  Thank you

## 2025-01-06 ENCOUNTER — TELEPHONE (OUTPATIENT)
Dept: HEMATOLOGY ONCOLOGY | Facility: CLINIC | Age: 70
End: 2025-01-06

## 2025-01-06 NOTE — TELEPHONE ENCOUNTER
AN CLERICAL: Pt needs follow up appointment okay to [facility] to someone in [place] if she wishes.     Finance team: can you please help with authorization.

## 2025-01-21 ENCOUNTER — OFFICE VISIT (OUTPATIENT)
Dept: HEMATOLOGY ONCOLOGY | Facility: CLINIC | Age: 70
End: 2025-01-21
Payer: COMMERCIAL

## 2025-01-21 VITALS
DIASTOLIC BLOOD PRESSURE: 80 MMHG | BODY MASS INDEX: 34.2 KG/M2 | HEART RATE: 122 BPM | SYSTOLIC BLOOD PRESSURE: 130 MMHG | RESPIRATION RATE: 18 BRPM | HEIGHT: 63 IN | TEMPERATURE: 97.9 F | WEIGHT: 193 LBS | OXYGEN SATURATION: 91 %

## 2025-01-21 DIAGNOSIS — D68.52 PROTHROMBIN G20210A MUTATION (HCC): Primary | ICD-10-CM

## 2025-01-21 PROCEDURE — 99213 OFFICE O/P EST LOW 20 MIN: CPT | Performed by: INTERNAL MEDICINE

## 2025-01-21 NOTE — PROGRESS NOTES
Name: Leann Charles      : 1955      MRN: [id number]  Encounter Provider: Ade Loya MD  Encounter Date: 2025   Encounter department: [facility] [place]  :  Assessment & Plan  Prothrombin [id number] mutation (HCC)  69-year-old female with prothrombin C63262H gene heterozygous mutation with recurrent venous thromboembolic disease. The patient has history of DVT of the LEs  while actively smoking and on OCPs, treated with anticoagulation. She developed PE in 2015 after right hip replacement. She is S/P L hip replacement in 2016.  At that time, her systemic anticoagulation was bridged from coumadin to enoxaparin. IVC filter placed presurgery and removed 2 months later. Coumadin was discontinued summer 2016.  Currently, she is off systemic anticoagulation.  Her most recent bilateral lower extremity duplex venous ultrasound on 2022, did not show deep vein thrombosis.    Interim Assessment: Currently Mrs. Charles receives systemic anticoagulation with the direct oral anticoagulant dabigatran (Pradaxa) 75 mg per mouth twice daily.  For the past several years she has not had further episodes of venous thromboembolic disease.  She tolerates dabigatran well, without serious adverse events.  She has not had bleeding episodes.  Today she does not have new complaints.    Plan:  Continue lifelong anticoagulation with dabigatran  Return to hematology clinic for history and physical exam in 1 year       The patient understands and agrees with my management recommendations and plan of care. I answered questions to her satisfaction.      History of Present Illness   No chief complaint on file.  69-year-old female with prothrombin K29291D gene heterozygous mutation with recurrent venous thromboembolic disease. The patient has history of DVT of the LEs  while actively smoking and on OCPs, treated with anticoagulation. She developed PE in   2015 after right hip replacement. She underwent hip replacement in April 2016.  At that time, her systemic anticoagulation was bridged from coumadin to enoxaparin. IVC filter placed presurgery and removed 2 months later. Coumadin was discontinued summer 2016.  Currently, she is off systemic anticoagulation.  Her most recent bilateral lower extremity duplex venous ultrasound on January 24, 2022, did not show deep vein thrombosis.    Interim History: Currently, Mrs. White receives systemic anticoagulation with the direct oral anticoagulant dabigatran Pradaxa 75 mg per mouth twice daily.  For the past several years she has not had further episodes of venous thromboembolic disease.  She tolerates dabigatran well, without serious adverse events.  She has not had bleeding episodes.  Today she does not have new complaints. She does not have new symptoms.       Pertinent Medical History   01/20/25:   Past Medical History:   Diagnosis Date    Acid reflux     Anxiety     Arthritis     Back pain     Carpal tunnel syndrome, bilateral     COVID-19 virus infection 01/28/2022    Depression     Endometrial cancer (HCC) 1997    Hiatal hernia     History of DVT of lower extremity     Right leg-had IVc filter  removal today-6/6/2016    History of pulmonary embolism     History of uterine cancer     Was stage 3- uterine, ovaries-hysterectomy    Hypercholesteremia     Hypertension     Left leg pain     Lumbar herniated disc     Tumor cells, benign     In right atrium. Probably congenital.    Varicose vein of leg     Wears glasses        Review of Systems   Constitutional:  Negative for activity change, appetite change, chills, diaphoresis, fatigue, fever and unexpected weight change.   HENT:  Negative for congestion, dental problem, ear discharge, ear pain, facial swelling, hearing loss, mouth sores, nosebleeds, postnasal drip, rhinorrhea, sinus pain, sneezing, sore throat, tinnitus, trouble swallowing and voice change.    Eyes:   Negative for photophobia, pain, discharge, redness, itching and visual disturbance.   Respiratory:  Negative for apnea, cough, choking, chest tightness, shortness of breath, wheezing and stridor.    Cardiovascular:  Negative for chest pain, palpitations and leg swelling.   Gastrointestinal:  Negative for abdominal distention, abdominal pain, anal bleeding, blood in stool, constipation, diarrhea, nausea, rectal pain and vomiting.   Endocrine: Negative for cold intolerance and heat intolerance.   Genitourinary:  Negative for decreased urine volume, difficulty urinating, dysuria, enuresis, flank pain, frequency, hematuria and urgency.   Musculoskeletal:  Negative for arthralgias, back pain, gait problem, joint swelling, myalgias, neck pain and neck stiffness.   Skin:  Negative for color change, pallor, rash and wound.   Neurological:  Negative for dizziness, tremors, seizures, syncope, facial asymmetry, speech difficulty, weakness, light-headedness, numbness and headaches.   Hematological:  Negative for adenopathy. Does not bruise/bleed easily.   Psychiatric/Behavioral:  Negative for behavioral problems, confusion, dysphoric mood and sleep disturbance. The patient is not nervous/anxious.          Objective   There were no vitals taken for this visit.    Physical Exam  Constitutional:       Comments:  female overweight without acute respiratory distress   HENT:      Head: Normocephalic and atraumatic.      Nose: Nose normal.      Mouth/Throat:      Mouth: Mucous membranes are moist.      Pharynx: Oropharynx is clear.   Eyes:      Extraocular Movements: Extraocular movements intact.      Conjunctiva/sclera: Conjunctivae normal.      Pupils: Pupils are equal, round, and reactive to light.      Comments: No scleral icterus    Neck:      Comments: No cervical, supraclavicular, axillary, epitrochlear, or inguinal lymphadenopathy.   Cardiovascular:      Rate and Rhythm: Normal rate and regular rhythm.      Pulses:  Normal pulses.      Heart sounds: Normal heart sounds.   Pulmonary:      Effort: Pulmonary effort is normal.      Breath sounds: Normal breath sounds.   Abdominal:      General: Bowel sounds are normal.      Palpations: Abdomen is soft.      Comments: Abdomen soft, nontender, nonpainful.  No hepatomegaly.  No splenomegaly.  No rebound tenderness.  No lateral or shifting dullness.  Bowel sounds present, normal.    Musculoskeletal:         General: Normal range of motion.      Cervical back: Normal range of motion and neck supple.   Skin:     General: Skin is warm and dry.      Capillary Refill: Capillary refill takes less than 2 seconds.   Neurological:      General: No focal deficit present.      Mental Status: She is alert and oriented to person, place, and time. Mental status is at baseline.   Psychiatric:         Mood and Affect: Mood normal.         Behavior: Behavior normal.         Thought Content: Thought content normal.         Judgment: Judgment normal.     Labs: I have reviewed the following labs:  Results for orders placed or performed in visit on 12/05/22   Hepatitis C Antibody ([facility])   Result Value Ref Range    Hepatitis C Ab Non-reactive Non-reactive

## 2025-01-21 NOTE — ASSESSMENT & PLAN NOTE
69-year-old female with prothrombin R49998R gene heterozygous mutation with recurrent venous thromboembolic disease. The patient has history of DVT of the LEs 2010 while actively smoking and on OCPs, treated with anticoagulation. She developed PE in December of 2015 after right hip replacement. She is S/P L hip replacement in April 2016.  At that time, her systemic anticoagulation was bridged from coumadin to enoxaparin. IVC filter placed presurgery and removed 2 months later. Coumadin was discontinued summer 2016.  Currently, she is off systemic anticoagulation.  Her most recent bilateral lower extremity duplex venous ultrasound on January 24, 2022, did not show deep vein thrombosis.    Interim Assessment: Currently Mrs. Charles receives systemic anticoagulation with the direct oral anticoagulant dabigatran (Pradaxa) 75 mg per mouth twice daily.  For the past several years she has not had further episodes of venous thromboembolic disease.  She tolerates dabigatran well, without serious adverse events.  She has not had bleeding episodes.  Today she does not have new complaints.    Plan:  Continue lifelong anticoagulation with dabigatran  Return to hematology clinic for history and physical exam in 1 year

## 2025-01-30 DIAGNOSIS — I82.509 CHRONIC DEEP VEIN THROMBOSIS (DVT) OF LOWER EXTREMITY, UNSPECIFIED LATERALITY, UNSPECIFIED VEIN (HCC): ICD-10-CM

## 2025-01-30 RX ORDER — DABIGATRAN ETEXILATE 75 MG/1
75 CAPSULE ORAL EVERY 12 HOURS
Qty: 60 CAPSULE | Refills: 3 | Status: SHIPPED | OUTPATIENT
Start: 2025-01-30

## 2025-02-06 DIAGNOSIS — I63.412 CEREBROVASCULAR ACCIDENT (CVA) DUE TO EMBOLISM OF LEFT MIDDLE CEREBRAL ARTERY (HCC): ICD-10-CM

## 2025-02-07 RX ORDER — ATORVASTATIN CALCIUM 40 MG/1
40 TABLET, FILM COATED ORAL EVERY EVENING
Qty: 90 TABLET | Refills: 0 | Status: SHIPPED | OUTPATIENT
Start: 2025-02-07

## 2025-02-07 NOTE — TELEPHONE ENCOUNTER
Called pt, left voicemail for pt to call us back and book a follow-up appt as per Dr. Marin recommendation.

## 2025-03-08 DIAGNOSIS — M48.061 SPINAL STENOSIS OF LUMBAR REGION, UNSPECIFIED WHETHER NEUROGENIC CLAUDICATION PRESENT: ICD-10-CM

## 2025-03-10 RX ORDER — GABAPENTIN 300 MG/1
300 CAPSULE ORAL 3 TIMES DAILY
Qty: 90 CAPSULE | Refills: 1 | Status: SHIPPED | OUTPATIENT
Start: 2025-03-10

## 2025-04-22 ENCOUNTER — VBI (OUTPATIENT)
Dept: ADMINISTRATIVE | Facility: OTHER | Age: 70
End: 2025-04-22

## 2025-04-22 NOTE — TELEPHONE ENCOUNTER
Patient contacted to schedule Annual Wellness Visit.   Patient declined to schedule appointment.     Thank you.  Claire Cannon MA  [facility]

## 2025-05-03 DIAGNOSIS — M48.061 SPINAL STENOSIS OF LUMBAR REGION, UNSPECIFIED WHETHER NEUROGENIC CLAUDICATION PRESENT: ICD-10-CM

## 2025-05-05 RX ORDER — GABAPENTIN 300 MG/1
300 CAPSULE ORAL 3 TIMES DAILY
Qty: 90 CAPSULE | Refills: 0 | Status: SHIPPED | OUTPATIENT
Start: 2025-05-05

## 2025-05-07 DIAGNOSIS — I63.412 CEREBROVASCULAR ACCIDENT (CVA) DUE TO EMBOLISM OF LEFT MIDDLE CEREBRAL ARTERY (HCC): ICD-10-CM

## 2025-05-07 RX ORDER — ATORVASTATIN CALCIUM 40 MG/1
40 TABLET, FILM COATED ORAL EVERY EVENING
Qty: 90 TABLET | Refills: 0 | Status: SHIPPED | OUTPATIENT
Start: 2025-05-07

## 2025-05-08 NOTE — TELEPHONE ENCOUNTER
Spoke to patient I let her know that her medications was sent to his pharmacy. I let her know that she was due for a follow up but she stated that right now is difficult because she is staying with her mom.

## 2025-05-29 DIAGNOSIS — I82.509 CHRONIC DEEP VEIN THROMBOSIS (DVT) OF LOWER EXTREMITY, UNSPECIFIED LATERALITY, UNSPECIFIED VEIN (HCC): ICD-10-CM

## 2025-05-29 RX ORDER — DABIGATRAN ETEXILATE 75 MG/1
75 CAPSULE ORAL 2 TIMES DAILY
Qty: 60 CAPSULE | Refills: 3 | Status: SHIPPED | OUTPATIENT
Start: 2025-05-29

## 2025-06-06 DIAGNOSIS — F32.A DEPRESSION, UNSPECIFIED DEPRESSION TYPE: ICD-10-CM

## 2025-06-07 DIAGNOSIS — M48.061 SPINAL STENOSIS OF LUMBAR REGION, UNSPECIFIED WHETHER NEUROGENIC CLAUDICATION PRESENT: ICD-10-CM

## 2025-06-09 RX ORDER — GABAPENTIN 300 MG/1
300 CAPSULE ORAL 3 TIMES DAILY
Qty: 90 CAPSULE | Refills: 0 | Status: SHIPPED | OUTPATIENT
Start: 2025-06-09

## 2025-06-09 RX ORDER — FLUOXETINE 10 MG/1
CAPSULE ORAL
Qty: 90 CAPSULE | Refills: 0 | Status: SHIPPED | OUTPATIENT
Start: 2025-06-09

## 2025-06-14 DIAGNOSIS — R10.13 DYSPEPSIA: ICD-10-CM

## 2025-06-16 RX ORDER — PANTOPRAZOLE SODIUM 20 MG/1
20 TABLET, DELAYED RELEASE ORAL DAILY
Qty: 90 TABLET | Refills: 1 | Status: SHIPPED | OUTPATIENT
Start: 2025-06-16

## 2025-06-16 NOTE — TELEPHONE ENCOUNTER
Contacted patient and she states she can not come in for an appointment due to her being in [place] taking care of her mother. Please review refill request and advise

## 2025-07-08 DIAGNOSIS — M48.061 SPINAL STENOSIS OF LUMBAR REGION, UNSPECIFIED WHETHER NEUROGENIC CLAUDICATION PRESENT: ICD-10-CM

## 2025-07-09 RX ORDER — GABAPENTIN 300 MG/1
300 CAPSULE ORAL 3 TIMES DAILY
Qty: 90 CAPSULE | Refills: 0 | Status: SHIPPED | OUTPATIENT
Start: 2025-07-09

## 2025-07-18 ENCOUNTER — TELEPHONE (OUTPATIENT)
Age: 70
End: 2025-07-18

## 2025-07-18 NOTE — TELEPHONE ENCOUNTER
Patient called to schedule an appointment with Dr. Marin - she is due for an ANNUAL WELLNESS VISIT, however she is having an issue that she would like to discuss with her.      She is having anal tract closure which will not allow a colonoscopy.  She has some concerns that she would like to discuss    She is currently in [place] caring for her mom.  She will be in the area the week of July 28 - looking for an appointment that week (only day not available is the 30th)    No available appts on Dr. Marin's schedule.  Can doctor Tania see her?  Please advise.    If she can't she would be willing to see Huyen (requesting female), however she did not want to book with her at this point.    Please advise [phone]

## 2025-07-18 NOTE — TELEPHONE ENCOUNTER
Patient arrived to floor from ED, diagnosis of anemia. Oriented to room and call light.  Pain assessed.  Care plan discussed and patient involved in plan of care.  Will continue to monitor.    Please review pt's concerns. Thank you

## 2025-07-23 ENCOUNTER — DOCUMENTATION (OUTPATIENT)
Dept: ADMINISTRATIVE | Facility: OTHER | Age: 70
End: 2025-07-23

## 2025-07-23 NOTE — PROGRESS NOTES
07/23/25 11:50 AM    Annual Wellness Visit outreach is not required, patient has an upcoming appointment with the PCP office.    Thank you.  DEAN MILAN MA  [facility]

## 2025-07-29 ENCOUNTER — APPOINTMENT (OUTPATIENT)
Dept: LAB | Facility: CLINIC | Age: 70
End: 2025-07-29
Payer: COMMERCIAL

## 2025-07-29 ENCOUNTER — HOSPITAL ENCOUNTER (OUTPATIENT)
Dept: RADIOLOGY | Facility: HOSPITAL | Age: 70
Discharge: HOME | End: 2025-07-29
Attending: PHYSICIAN ASSISTANT
Payer: COMMERCIAL

## 2025-07-29 ENCOUNTER — OFFICE VISIT (OUTPATIENT)
Age: 70
End: 2025-07-29
Payer: COMMERCIAL

## 2025-07-29 VITALS
TEMPERATURE: 98.7 F | OXYGEN SATURATION: 95 % | WEIGHT: 184 LBS | SYSTOLIC BLOOD PRESSURE: 138 MMHG | RESPIRATION RATE: 16 BRPM | HEART RATE: 124 BPM | HEIGHT: 63 IN | BODY MASS INDEX: 32.6 KG/M2 | DIASTOLIC BLOOD PRESSURE: 70 MMHG

## 2025-07-29 DIAGNOSIS — Z80.0 FAMILY HISTORY OF COLON CANCER IN FATHER: ICD-10-CM

## 2025-07-29 DIAGNOSIS — R19.5 LOOSE STOOLS: ICD-10-CM

## 2025-07-29 DIAGNOSIS — R19.5 POSITIVE COLORECTAL CANCER SCREENING USING COLOGUARD TEST: ICD-10-CM

## 2025-07-29 DIAGNOSIS — R63.4 UNINTENTIONAL WEIGHT LOSS: Primary | ICD-10-CM

## 2025-07-29 DIAGNOSIS — R63.4 UNINTENTIONAL WEIGHT LOSS: ICD-10-CM

## 2025-07-29 LAB
ANION GAP SERPL CALCULATED.3IONS-SCNC: 10 MMOL/L (ref 4–13)
BUN SERPL-MCNC: 13 MG/DL (ref 5–25)
CALCIUM SERPL-MCNC: 9 MG/DL (ref 8.4–10.2)
CHLORIDE SERPL-SCNC: 99 MMOL/L (ref 96–108)
CO2 SERPL-SCNC: 30 MMOL/L (ref 21–32)
CREAT SERPL-MCNC: 0.97 MG/DL (ref 0.6–1.3)
GFR SERPL CREATININE-BSD FRML MDRD: 59 ML/MIN/1.73SQ M
GLUCOSE P FAST SERPL-MCNC: 104 MG/DL (ref 65–99)
POTASSIUM SERPL-SCNC: 4.4 MMOL/L (ref 3.5–5.3)
SODIUM SERPL-SCNC: 139 MMOL/L (ref 135–147)

## 2025-07-29 PROCEDURE — G2211 COMPLEX E/M VISIT ADD ON: HCPCS | Performed by: PHYSICIAN ASSISTANT

## 2025-07-29 PROCEDURE — 99214 OFFICE O/P EST MOD 30 MIN: CPT | Performed by: PHYSICIAN ASSISTANT

## 2025-07-29 PROCEDURE — 36415 COLL VENOUS BLD VENIPUNCTURE: CPT

## 2025-07-29 PROCEDURE — 74177 CT ABD & PELVIS W/CONTRAST: CPT

## 2025-07-29 PROCEDURE — 80048 BASIC METABOLIC PNL TOTAL CA: CPT

## 2025-07-29 RX ADMIN — IOHEXOL 50 ML: 350 INJECTION, SOLUTION INTRAVENOUS at 17:36

## 2025-08-05 ENCOUNTER — TELEMEDICINE (OUTPATIENT)
Age: 70
End: 2025-08-05
Payer: COMMERCIAL

## 2025-08-05 DIAGNOSIS — K63.9 MURAL THICKENING OF SIGMOID COLON: Primary | ICD-10-CM

## 2025-08-05 DIAGNOSIS — M62.830 BACK SPASM: ICD-10-CM

## 2025-08-05 DIAGNOSIS — Z13.29 SCREENING FOR THYROID DISORDER: ICD-10-CM

## 2025-08-05 DIAGNOSIS — Z13.1 SCREENING FOR DIABETES MELLITUS (DM): ICD-10-CM

## 2025-08-05 DIAGNOSIS — R63.4 UNINTENTIONAL WEIGHT LOSS: ICD-10-CM

## 2025-08-05 DIAGNOSIS — K57.90 DIVERTICULOSIS: ICD-10-CM

## 2025-08-05 DIAGNOSIS — E78.5 DYSLIPIDEMIA: ICD-10-CM

## 2025-08-05 DIAGNOSIS — Z87.891 FORMER SMOKER: Chronic | ICD-10-CM

## 2025-08-05 DIAGNOSIS — Z13.89 SCREENING FOR GENITOURINARY CONDITION: ICD-10-CM

## 2025-08-05 PROCEDURE — G2211 COMPLEX E/M VISIT ADD ON: HCPCS | Performed by: INTERNAL MEDICINE

## 2025-08-05 PROCEDURE — 99214 OFFICE O/P EST MOD 30 MIN: CPT | Performed by: INTERNAL MEDICINE

## 2025-08-05 RX ORDER — CYCLOBENZAPRINE HCL 5 MG
5 TABLET ORAL 3 TIMES DAILY PRN
Qty: 20 TABLET | Refills: 0 | Status: SHIPPED | OUTPATIENT
Start: 2025-08-05

## 2025-08-06 DIAGNOSIS — I63.412 CEREBROVASCULAR ACCIDENT (CVA) DUE TO EMBOLISM OF LEFT MIDDLE CEREBRAL ARTERY (HCC): ICD-10-CM

## 2025-08-07 RX ORDER — ATORVASTATIN CALCIUM 40 MG/1
40 TABLET, FILM COATED ORAL EVERY EVENING
Qty: 90 TABLET | Refills: 0 | Status: SHIPPED | OUTPATIENT
Start: 2025-08-07

## 2025-08-14 ENCOUNTER — OFFICE VISIT (OUTPATIENT)
Dept: GASTROENTEROLOGY | Facility: CLINIC | Age: 70
End: 2025-08-14
Attending: INTERNAL MEDICINE
Payer: COMMERCIAL

## 2025-08-14 ENCOUNTER — TELEPHONE (OUTPATIENT)
Dept: GASTROENTEROLOGY | Facility: CLINIC | Age: 70
End: 2025-08-14

## 2025-08-21 ENCOUNTER — ANESTHESIA (OUTPATIENT)
Dept: GASTROENTEROLOGY | Facility: HOSPITAL | Age: 70
End: 2025-08-21
Payer: COMMERCIAL

## 2025-08-21 ENCOUNTER — ANESTHESIA EVENT (OUTPATIENT)
Dept: GASTROENTEROLOGY | Facility: HOSPITAL | Age: 70
End: 2025-08-21
Payer: COMMERCIAL

## 2025-08-21 PROBLEM — R00.0 TACHYARRHYTHMIA: Status: ACTIVE | Noted: 2025-08-21

## 2025-08-21 RX ORDER — SODIUM CHLORIDE, SODIUM LACTATE, POTASSIUM CHLORIDE, CALCIUM CHLORIDE 600; 310; 30; 20 MG/100ML; MG/100ML; MG/100ML; MG/100ML
INJECTION, SOLUTION INTRAVENOUS CONTINUOUS PRN
Status: DISCONTINUED | OUTPATIENT
Start: 2025-08-21 | End: 2025-08-21

## 2025-08-21 RX ADMIN — SODIUM CHLORIDE, SODIUM LACTATE, POTASSIUM CHLORIDE, AND CALCIUM CHLORIDE: .6; .31; .03; .02 INJECTION, SOLUTION INTRAVENOUS at 16:12

## 2025-08-22 PROBLEM — K63.9 MURAL THICKENING OF SIGMOID COLON: Status: ACTIVE | Noted: 2025-08-22

## 2025-08-22 PROBLEM — J96.21 ACUTE ON CHRONIC RESPIRATORY FAILURE WITH HYPOXIA (HCC): Status: ACTIVE | Noted: 2022-01-21

## 2025-08-22 PROBLEM — R93.89 ABNORMAL CT OF THE CHEST: Status: ACTIVE | Noted: 2025-08-22

## 2025-08-23 ENCOUNTER — ANESTHESIA EVENT (INPATIENT)
Dept: PERIOP | Facility: HOSPITAL | Age: 70
DRG: 310 | End: 2025-08-23
Payer: COMMERCIAL

## 2025-08-23 ENCOUNTER — ANESTHESIA (INPATIENT)
Dept: PERIOP | Facility: HOSPITAL | Age: 70
DRG: 310 | End: 2025-08-23
Payer: COMMERCIAL

## 2025-08-23 PROBLEM — I47.19 PAT (PAROXYSMAL ATRIAL TACHYCARDIA) (HCC): Status: ACTIVE | Noted: 2025-08-21

## 2025-08-23 RX ORDER — PROPOFOL 10 MG/ML
INJECTION, EMULSION INTRAVENOUS AS NEEDED
Status: DISCONTINUED | OUTPATIENT
Start: 2025-08-23 | End: 2025-08-23

## 2025-08-23 RX ORDER — LIDOCAINE HYDROCHLORIDE 10 MG/ML
INJECTION, SOLUTION EPIDURAL; INFILTRATION; INTRACAUDAL; PERINEURAL AS NEEDED
Status: DISCONTINUED | OUTPATIENT
Start: 2025-08-23 | End: 2025-08-23

## 2025-08-23 RX ORDER — PROPOFOL 10 MG/ML
INJECTION, EMULSION INTRAVENOUS CONTINUOUS PRN
Status: DISCONTINUED | OUTPATIENT
Start: 2025-08-23 | End: 2025-08-23

## 2025-08-23 RX ADMIN — PROPOFOL 30 MG: 10 INJECTION, EMULSION INTRAVENOUS at 11:31

## 2025-08-23 RX ADMIN — PROPOFOL 30 MG: 10 INJECTION, EMULSION INTRAVENOUS at 11:39

## 2025-08-23 RX ADMIN — PROPOFOL 50 MG: 10 INJECTION, EMULSION INTRAVENOUS at 11:21

## 2025-08-23 RX ADMIN — PROPOFOL 20 MG: 10 INJECTION, EMULSION INTRAVENOUS at 11:22

## 2025-08-23 RX ADMIN — PROPOFOL 20 MG: 10 INJECTION, EMULSION INTRAVENOUS at 11:33

## 2025-08-23 RX ADMIN — SODIUM CHLORIDE, SODIUM LACTATE, POTASSIUM CHLORIDE, AND CALCIUM CHLORIDE: .6; .31; .03; .02 INJECTION, SOLUTION INTRAVENOUS at 11:03

## 2025-08-23 RX ADMIN — PROPOFOL 100 MCG/KG/MIN: 10 INJECTION, EMULSION INTRAVENOUS at 11:21

## 2025-08-23 RX ADMIN — LIDOCAINE HYDROCHLORIDE 30 MG: 10 INJECTION, SOLUTION EPIDURAL; INFILTRATION; INTRACAUDAL; PERINEURAL at 11:08
